# Patient Record
Sex: MALE | Race: WHITE | Employment: OTHER | URBAN - METROPOLITAN AREA
[De-identification: names, ages, dates, MRNs, and addresses within clinical notes are randomized per-mention and may not be internally consistent; named-entity substitution may affect disease eponyms.]

---

## 2017-11-16 ENCOUNTER — HOSPITAL ENCOUNTER (OUTPATIENT)
Dept: LAB | Age: 67
Discharge: HOME | End: 2017-11-16

## 2017-11-16 PROCEDURE — 88305 TISSUE EXAM BY PATHOLOGIST: CPT | Performed by: INTERNAL MEDICINE

## 2020-12-01 PROCEDURE — 88305 TISSUE EXAM BY PATHOLOGIST: CPT

## 2020-12-02 ENCOUNTER — HOSPITAL ENCOUNTER (OUTPATIENT)
Dept: LAB | Age: 70
Discharge: HOME | End: 2020-12-02

## 2022-07-10 ENCOUNTER — APPOINTMENT (OUTPATIENT)
Dept: CT IMAGING | Age: 72
End: 2022-07-10
Payer: MEDICARE

## 2022-07-10 ENCOUNTER — HOSPITAL ENCOUNTER (EMERGENCY)
Age: 72
Discharge: HOME | End: 2022-07-10
Attending: EMERGENCY MEDICINE
Payer: MEDICARE

## 2022-07-10 ENCOUNTER — APPOINTMENT (OUTPATIENT)
Dept: GENERAL RADIOLOGY | Age: 72
End: 2022-07-10
Payer: MEDICARE

## 2022-07-10 VITALS
RESPIRATION RATE: 12 BRPM | OXYGEN SATURATION: 94 % | WEIGHT: 250 LBS | HEART RATE: 57 BPM | SYSTOLIC BLOOD PRESSURE: 110 MMHG | BODY MASS INDEX: 46.01 KG/M2 | DIASTOLIC BLOOD PRESSURE: 55 MMHG | HEIGHT: 62 IN

## 2022-07-10 DIAGNOSIS — S42.291A OTHER CLOSED DISPLACED FRACTURE OF PROXIMAL END OF RIGHT HUMERUS, INITIAL ENCOUNTER: Primary | ICD-10-CM

## 2022-07-10 PROCEDURE — 72125 CT NECK SPINE W/O DYE: CPT

## 2022-07-10 PROCEDURE — 99284 EMERGENCY DEPT VISIT MOD MDM: CPT

## 2022-07-10 PROCEDURE — 71250 CT THORAX DX C-: CPT

## 2022-07-10 PROCEDURE — 96374 THER/PROPH/DIAG INJ IV PUSH: CPT

## 2022-07-10 PROCEDURE — 2700000000 HC OXYGEN THERAPY PER DAY

## 2022-07-10 PROCEDURE — 70450 CT HEAD/BRAIN W/O DYE: CPT

## 2022-07-10 PROCEDURE — 73030 X-RAY EXAM OF SHOULDER: CPT

## 2022-07-10 PROCEDURE — 6360000002 HC RX W HCPCS: Performed by: EMERGENCY MEDICINE

## 2022-07-10 RX ORDER — OXYCODONE HYDROCHLORIDE AND ACETAMINOPHEN 5; 325 MG/1; MG/1
1 TABLET ORAL EVERY 6 HOURS PRN
Qty: 25 TABLET | Refills: 0 | Status: ON HOLD | OUTPATIENT
Start: 2022-07-10 | End: 2022-07-18 | Stop reason: HOSPADM

## 2022-07-10 RX ORDER — HYDROMORPHONE HYDROCHLORIDE 1 MG/ML
1 INJECTION, SOLUTION INTRAMUSCULAR; INTRAVENOUS; SUBCUTANEOUS
Status: COMPLETED | OUTPATIENT
Start: 2022-07-10 | End: 2022-07-10

## 2022-07-10 RX ORDER — METHOCARBAMOL 750 MG/1
750 TABLET, FILM COATED ORAL 3 TIMES DAILY PRN
Qty: 20 TABLET | Refills: 0 | Status: SHIPPED | OUTPATIENT
Start: 2022-07-10 | End: 2022-07-20

## 2022-07-10 RX ADMIN — HYDROMORPHONE HYDROCHLORIDE 1 MG: 1 INJECTION, SOLUTION INTRAMUSCULAR; INTRAVENOUS; SUBCUTANEOUS at 05:29

## 2022-07-10 ASSESSMENT — ENCOUNTER SYMPTOMS
SHORTNESS OF BREATH: 0
CHEST TIGHTNESS: 0
COUGH: 0
WHEEZING: 0
APNEA: 0
STRIDOR: 0

## 2022-07-10 ASSESSMENT — PAIN - FUNCTIONAL ASSESSMENT: PAIN_FUNCTIONAL_ASSESSMENT: 0-10

## 2022-07-10 ASSESSMENT — PAIN SCALES - GENERAL
PAINLEVEL_OUTOF10: 8
PAINLEVEL_OUTOF10: 10

## 2022-07-10 ASSESSMENT — PAIN DESCRIPTION - ORIENTATION: ORIENTATION: RIGHT

## 2022-07-10 ASSESSMENT — PAIN DESCRIPTION - LOCATION
LOCATION: SHOULDER
LOCATION: SHOULDER

## 2022-07-10 NOTE — ED TRIAGE NOTES
Pt arrives via [facility] from home. C/O fall from standing. Skin tears noted on forearms. States he might have hit his head. 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 C/O right shoulder pain.

## 2022-07-10 NOTE — ED PROVIDER NOTES
[employer] Emergency Department Provider Note                   PCP:                Phoebe Hdz               Age: 80 y.o. Sex: male       ICD-10-CM    1. Other closed displaced fracture of proximal end of right humerus, initial encounter  S42.291A oxyCODONE-acetaminophen (PERCOCET) 5-325 MG per tablet       DISPOSITION Decision To Discharge 07/10/2022 07:22:20 AM       MDM  Number of Diagnoses or Management Options  Other closed displaced fracture of proximal end of right humerus, initial encounter  Diagnosis management comments: Patient is neurovascularly intact. Does have significantly damaged humerus with comminuted fracture with lots of displacement. I had orthopedics reviewed films. They recommend patient having outpatient plan and their office will call him tomorrow to get him in close follow-up recommend sling which we put him in. Also given pain control. Amount and/or Complexity of Data Reviewed  Tests in the radiology section of CPT®: ordered and reviewed         Orders Placed This Encounter   Procedures    CT HEAD WO CONTRAST    CT CERVICAL SPINE WO CONTRAST    XR SHOULDER RIGHT (MIN 2 VIEWS)    CT CHEST WO CONTRAST    Sling and Swath        Herlinda Perea is a 80 y.o. male who presents to the Emergency Department with chief complaint of    Chief Complaint   Patient presents with    Fall      Patient has a fall from standing. He tripped. He states he was getting up to get a midnight snack and did not pass out. He fell onto his right shoulder and has intense pain there. He had some mild pain to his head but the shoulder has been causing him severe discomfort. He has some swelling to the proximal humerus as well. EMS did give 2 doses of ketamine for comfort during transport. Review of Systems   Constitutional: Negative for activity change, appetite change, chills, fatigue and fever.    Respiratory: Negative for apnea, cough, chest tightness, shortness of breath, wheezing and stridor. Cardiovascular: Negative for chest pain and palpitations. Musculoskeletal: Positive for arthralgias and joint swelling. Neurological: Positive for headaches. Negative for seizures. All other systems reviewed and are negative. Past Medical History:   Diagnosis Date    Arthritis     hips, back  osteo    Cancer ([address]     skin  left arm removed    DISH (diffuse idiopathic skeletal hyperostosis)     DR LEE    Hypertension dx'd about 2008    controlled with meds    Thromboembolus ([address] 08, 12, AND 15+ yrs ago     ? left leg X 3 ON COUMADIN        Past Surgical History:   Procedure Laterality Date    LIPOMA RESECTION  as a teenager    left side    OTHER SURGICAL HISTORY      wisdom teeth removed    OTHER SURGICAL HISTORY      lipoma removed left hip    TOTAL HIP ARTHROPLASTY  4/2010    right    WISDOM TOOTH EXTRACTION  as a teenager        Family History   Problem Relation Age of Onset    Cancer Mother            Social Connections:     Frequency of Communication with Friends and Family: Not on file    Frequency of Social Gatherings with Friends and Family: Not on file    Attends [employer] Services: Not on file    Active Member of Clubs or Organizations: Not on file    Attends Club or Organization Meetings: Not on file    Marital Status: Not on file        Allergies   Allergen Reactions    Meperidine Nausea And Vomiting    Morphine Nausea And Vomiting        Vitals signs and nursing note reviewed. Physical Exam  Vitals and nursing note reviewed. Constitutional:       General: He is not in acute distress. Appearance: Normal appearance. He is not ill-appearing, toxic-appearing or diaphoretic. HENT:      Head: Normocephalic and atraumatic. Eyes:      General: No scleral icterus. Extraocular Movements: Extraocular movements intact. Conjunctiva/sclera: Conjunctivae normal.      Pupils: Pupils are equal, round, and reactive to light. Cardiovascular:      Comments: Intact distal pulses to the right arm. Pulmonary:      Effort: Pulmonary effort is normal. No respiratory distress. Breath sounds: No stridor. Musculoskeletal:      Cervical back: Normal range of motion and neck supple. Comments: Deformity to the right proximal upper arm. There is some ecchymosis present as well. Skin:     General: Skin is warm. Capillary Refill: Capillary refill takes less than 2 seconds. Coloration: Skin is not jaundiced or pale. Findings: No bruising, erythema or rash. Neurological:      General: No focal deficit present. Mental Status: He is alert and oriented to person, place, and time. Mental status is at baseline. Psychiatric:         Mood and Affect: Mood normal.         Behavior: Behavior normal.          Procedures        Labs Reviewed - No data to display     CT HEAD WO CONTRAST   Final Result   1. No CT evidence of acute intracranial process. EXAMINATION: CT HEAD WO CONTRAST, CT CERVICAL SPINE WO CONTRAST      HISTORY: Trauma. TECHNIQUE: Noncontrast CT of cervical spine was performed in the axial plane. Coronal and sagittal reformatted images were created and reviewed. Dose reduction technique used: Automated exposure control/Adjustment of the mA   and/or kV according to patient size/Use of iterative reconstruction technique. COMPARISON: None. FINDINGS:    No evidence of acute fracture or traumatic subluxation. There is normal cervical lordosis. Vertebral body heights and intervertebral disc spaces are maintained. The occipital condyles and visualized skull base is unremarkable. Multilevel degenerative changes are present throughout the cervical spine. No   high-grade spinal canal or neural foraminal stenosis is identified. There is no abnormal prevertebral soft tissue edema. No fluid collections are   identified within the paraspinal soft tissues. IMPRESSION:   No CT evidence of an acute fracture or traumatic subluxation. CT CERVICAL SPINE WO CONTRAST   Final Result   1. No CT evidence of acute intracranial process. EXAMINATION: CT HEAD WO CONTRAST, CT CERVICAL SPINE WO CONTRAST      HISTORY: Trauma. TECHNIQUE: Noncontrast CT of cervical spine was performed in the axial plane. Coronal and sagittal reformatted images were created and reviewed. Dose reduction technique used: Automated exposure control/Adjustment of the mA   and/or kV according to patient size/Use of iterative reconstruction technique. COMPARISON: None. FINDINGS:    No evidence of acute fracture or traumatic subluxation. There is normal cervical lordosis. Vertebral body heights and intervertebral disc spaces are maintained. The occipital condyles and visualized skull base is unremarkable. Multilevel degenerative changes are present throughout the cervical spine. No   high-grade spinal canal or neural foraminal stenosis is identified. There is no abnormal prevertebral soft tissue edema. No fluid collections are   identified within the paraspinal soft tissues. IMPRESSION:   No CT evidence of an acute fracture or traumatic subluxation. CT CHEST WO CONTRAST   Final Result      There is a somewhat focal, 1 cm opacity projecting in the right upper lobe. There is associated opacity extending to the hilum. This could represent a focal   contusion. A small nodule is not excluded. Follow-up imaging is recommended. There is no pneumothorax. The known right humeral fracture and the glenohumeral joint are not fully   visualized. There is significant intramuscular contusion involving the right pectoralis   major muscle. Aneurysmal dilatation of the ascending aorta as described above. XR SHOULDER RIGHT (MIN 2 VIEWS)   Final Result   Fracture of the proximal right humerus as described above. The appearance of the glenohumeral joint suggests dislocation or widening,   perhaps secondary to fluid in the joint. Voice dictation software was used during the making of this note. This software is not perfect and grammatical and other typographical errors may be present. This note has not been completely proofread for errors.       Beau Bennett MD  07/10/22 [id number]

## 2022-07-10 NOTE — ED NOTES
I have reviewed discharge instructions with the patient. The patient verbalized understanding. Patient left ED via Discharge Method: wheelchair to Home with son  Opportunity for questions and clarification provided. Patient given 2 scripts. To continue your aftercare when you leave the hospital, you may receive an automated call from our care team to check in on how you are doing. This is a free service and part of our promise to provide the best care and service to meet your aftercare needs.  If you have questions, or wish to unsubscribe from this service please call [phone]. Thank you for Choosing our [facility] Emergency Department.       Candice Ye RN  07/10/22 [id number]

## 2022-07-12 ENCOUNTER — OFFICE VISIT (OUTPATIENT)
Dept: ORTHOPEDIC SURGERY | Age: 72
End: 2022-07-12
Payer: MEDICARE

## 2022-07-12 DIAGNOSIS — S42.201A CLOSED FRACTURE OF PROXIMAL END OF RIGHT HUMERUS, UNSPECIFIED FRACTURE MORPHOLOGY, INITIAL ENCOUNTER: ICD-10-CM

## 2022-07-12 DIAGNOSIS — S42.201A CLOSED FRACTURE OF PROXIMAL END OF RIGHT HUMERUS, UNSPECIFIED FRACTURE MORPHOLOGY, INITIAL ENCOUNTER: Primary | ICD-10-CM

## 2022-07-12 PROCEDURE — 99024 POSTOP FOLLOW-UP VISIT: CPT | Performed by: ORTHOPAEDIC SURGERY

## 2022-07-12 PROCEDURE — L3650 SO 8 ABD RESTRAINT PRE OTS: HCPCS | Performed by: ORTHOPAEDIC SURGERY

## 2022-07-12 RX ORDER — OXYCODONE AND ACETAMINOPHEN 10; 325 MG/1; MG/1
1 TABLET ORAL EVERY 4 HOURS PRN
Qty: 6 TABLET | Refills: 0 | Status: ON HOLD | OUTPATIENT
Start: 2022-07-12 | End: 2022-07-18 | Stop reason: HOSPADM

## 2022-07-12 NOTE — PROGRESS NOTES
Progress Note    Patient: Camila Weathers MRN: [id number]  SSN: [id number]    YOB: 1950  Age: 80 y.o. Sex: male        7/12/2022      Subjective:     Patient is now about 4 days out from an injury which he fell and injured his right shoulder. He is pretty miserable today. He is having a lot of pain in his right shoulder. The pain medication only helps a little bit. Cannot really get comfortable. He is also complaining of some pain in his flank on the right side as well. He had a chest CT which just revealed an old healed rib fracture but this is where the area of his ribs where he is hurting as well. Does take Xarelto for what sounds like a DVT PE several years ago he been on Xarelto ever since. Objective: There were no vitals filed for this visit. Physical Exam:     Skin - no open wounds or pressure sores, he does have a significant mount of ecchymosis and bruising over his superior aspect of his right shoulder down around his right shoulder area  Motor and sensory function intact in RIGHT UPPER extremity  Pulses palpable in RIGHT UPPER extremity     XRAY FINDINGS:  [id number] right proximal humerus fracture, findings- true AP and scapular Y views the right shoulder shows that he has what appears to be a pretty comminuted proximal humerus fracture. This appears to be a true four-part and he also has this large fragment off of the diaphysis of his humerus    Assessment:     Right four-part proximal humerus fracture    Plan:     I have spoken with the patient regarding different treatment options. I do think that he really has 2 options I think 1 would be just to treat this nonoperatively I think the other one be a prosthetic replacement. I talked him a little bit about this.   And I explained that Dr. Sarwat Juarez is someone in my practice that has quite a bit of experience with prosthetic replacements both for fractures as well as for other problems and I think it would be reasonable for him to take a look at him. He has actually reviewed the x-rays with me today. The plan will be to see if he can be seen by Dr. Stacie Whatley and hopefully Dr. Stacie Whatley can talk to him about what he thinks is our the pluses and minuses of proceeding with operative intervention with likely proceeding with prosthetic replacement.     Signed By: Dolores Lester MD     July 12, 2022

## 2022-07-13 ENCOUNTER — HOSPITAL ENCOUNTER (OUTPATIENT)
Dept: SURGERY | Age: 72
Discharge: HOME | DRG: 483 | End: 2022-07-16
Payer: MEDICARE

## 2022-07-13 ENCOUNTER — ANESTHESIA EVENT (OUTPATIENT)
Dept: SURGERY | Age: 72
DRG: 483 | End: 2022-07-13
Payer: MEDICARE

## 2022-07-13 ENCOUNTER — OFFICE VISIT (OUTPATIENT)
Dept: ORTHOPEDIC SURGERY | Age: 72
End: 2022-07-13
Payer: MEDICARE

## 2022-07-13 ENCOUNTER — TELEPHONE (OUTPATIENT)
Dept: ORTHOPEDIC SURGERY | Age: 72
End: 2022-07-13

## 2022-07-13 ENCOUNTER — PREP FOR PROCEDURE (OUTPATIENT)
Dept: ORTHOPEDIC SURGERY | Age: 72
End: 2022-07-13

## 2022-07-13 VITALS
DIASTOLIC BLOOD PRESSURE: 72 MMHG | TEMPERATURE: 97.5 F | RESPIRATION RATE: 16 BRPM | OXYGEN SATURATION: 98 % | HEART RATE: 84 BPM | BODY MASS INDEX: 34.37 KG/M2 | HEIGHT: 70 IN | SYSTOLIC BLOOD PRESSURE: 124 MMHG | WEIGHT: 240.1 LBS

## 2022-07-13 DIAGNOSIS — S42.241A CLOSED 4-PART FRACTURE OF SURGICAL NECK OF RIGHT HUMERUS, INITIAL ENCOUNTER: Primary | ICD-10-CM

## 2022-07-13 DIAGNOSIS — M19.011 DEGENERATIVE JOINT DISEASE OF RIGHT ACROMIOCLAVICULAR JOINT: ICD-10-CM

## 2022-07-13 DIAGNOSIS — S42.391A OTHER CLOSED FRACTURE OF SHAFT OF RIGHT HUMERUS, INITIAL ENCOUNTER: ICD-10-CM

## 2022-07-13 DIAGNOSIS — S42.241A CLOSED 4-PART FRACTURE OF SURGICAL NECK OF RIGHT HUMERUS, INITIAL ENCOUNTER: ICD-10-CM

## 2022-07-13 PROBLEM — S42.301A CLOSED FRACTURE OF SHAFT OF RIGHT HUMERUS: Status: ACTIVE | Noted: 2022-07-13

## 2022-07-13 LAB
ABO + RH BLD: NORMAL
ALBUMIN SERPL-MCNC: 3.4 G/DL (ref 3.2–4.6)
ALBUMIN/GLOB SERPL: 0.9 {RATIO} (ref 1.2–3.5)
ALP SERPL-CCNC: 46 U/L (ref 50–136)
ALT SERPL-CCNC: 21 U/L (ref 12–65)
ANION GAP SERPL CALC-SCNC: 7 MMOL/L (ref 7–16)
APPEARANCE UR: ABNORMAL
APTT PPP: 30.5 SEC (ref 24.1–35.1)
AST SERPL-CCNC: 29 U/L (ref 15–37)
BACTERIA SPEC CULT: NORMAL
BACTERIA URNS QL MICRO: ABNORMAL /HPF
BASOPHILS # BLD: 0.1 K/UL (ref 0–0.2)
BASOPHILS NFR BLD: 1 % (ref 0–2)
BILIRUB SERPL-MCNC: 0.9 MG/DL (ref 0.2–1.1)
BILIRUB UR QL: NEGATIVE
BLOOD GROUP ANTIBODIES SERPL: NORMAL
BUN SERPL-MCNC: 16 MG/DL (ref 8–23)
CALCIUM SERPL-MCNC: 9.5 MG/DL (ref 8.3–10.4)
CHLORIDE SERPL-SCNC: 104 MMOL/L (ref 98–107)
CO2 SERPL-SCNC: 27 MMOL/L (ref 21–32)
COLOR UR: ABNORMAL
CREAT SERPL-MCNC: 0.96 MG/DL (ref 0.8–1.5)
DIFFERENTIAL METHOD BLD: ABNORMAL
EOSINOPHIL # BLD: 0.1 K/UL (ref 0–0.8)
EOSINOPHIL NFR BLD: 2 % (ref 0.5–7.8)
EPI CELLS #/AREA URNS HPF: ABNORMAL /HPF
ERYTHROCYTE [DISTWIDTH] IN BLOOD BY AUTOMATED COUNT: 13.1 % (ref 11.9–14.6)
GLOBULIN SER CALC-MCNC: 3.6 G/DL (ref 2.3–3.5)
GLUCOSE SERPL-MCNC: 125 MG/DL (ref 65–100)
GLUCOSE UR STRIP.AUTO-MCNC: NEGATIVE MG/DL
HCT VFR BLD AUTO: 31 % (ref 41.1–50.3)
HGB BLD-MCNC: 10.6 G/DL (ref 13.6–17.2)
HGB UR QL STRIP: NEGATIVE
IMM GRANULOCYTES # BLD AUTO: 0 K/UL (ref 0–0.5)
IMM GRANULOCYTES NFR BLD AUTO: 1 % (ref 0–5)
INR PPP: 1.3
KETONES UR QL STRIP.AUTO: ABNORMAL MG/DL
LEUKOCYTE ESTERASE UR QL STRIP.AUTO: ABNORMAL
LYMPHOCYTES # BLD: 1.9 K/UL (ref 0.5–4.6)
LYMPHOCYTES NFR BLD: 23 % (ref 13–44)
MAGNESIUM SERPL-MCNC: 2.2 MG/DL (ref 1.8–2.4)
MCH RBC QN AUTO: 31.2 PG (ref 26.1–32.9)
MCHC RBC AUTO-ENTMCNC: 34.2 G/DL (ref 31.4–35)
MCV RBC AUTO: 91.2 FL (ref 79.6–97.8)
MONOCYTES # BLD: 0.9 K/UL (ref 0.1–1.3)
MONOCYTES NFR BLD: 10 % (ref 4–12)
MUCOUS THREADS URNS QL MICRO: ABNORMAL /LPF
NEUTS SEG # BLD: 5.3 K/UL (ref 1.7–8.2)
NEUTS SEG NFR BLD: 64 % (ref 43–78)
NITRITE UR QL STRIP.AUTO: NEGATIVE
NRBC # BLD: 0 K/UL (ref 0–0.2)
PH UR STRIP: 7 [PH] (ref 5–9)
PLATELET # BLD AUTO: 264 K/UL (ref 150–450)
PMV BLD AUTO: 10.1 FL (ref 9.4–12.3)
POTASSIUM SERPL-SCNC: 3.5 MMOL/L (ref 3.5–5.1)
PROT SERPL-MCNC: 7 G/DL (ref 6.3–8.2)
PROT UR STRIP-MCNC: ABNORMAL MG/DL
PROTHROMBIN TIME: 16.2 SEC (ref 12.6–14.5)
RBC # BLD AUTO: 3.4 M/UL (ref 4.23–5.6)
RBC #/AREA URNS HPF: ABNORMAL /HPF
SERVICE CMNT-IMP: NORMAL
SODIUM SERPL-SCNC: 138 MMOL/L (ref 136–145)
SP GR UR REFRACTOMETRY: 1.03 (ref 1–1.02)
SPECIMEN EXP DATE BLD: NORMAL
UROBILINOGEN UR QL STRIP.AUTO: 2 EU/DL (ref 0.2–1)
WBC # BLD AUTO: 8.3 K/UL (ref 4.3–11.1)
WBC URNS QL MICRO: ABNORMAL /HPF

## 2022-07-13 PROCEDURE — 80053 COMPREHEN METABOLIC PANEL: CPT

## 2022-07-13 PROCEDURE — 86901 BLOOD TYPING SEROLOGIC RH(D): CPT

## 2022-07-13 PROCEDURE — 85610 PROTHROMBIN TIME: CPT

## 2022-07-13 PROCEDURE — 85730 THROMBOPLASTIN TIME PARTIAL: CPT

## 2022-07-13 PROCEDURE — 99215 OFFICE O/P EST HI 40 MIN: CPT | Performed by: ORTHOPAEDIC SURGERY

## 2022-07-13 PROCEDURE — 83735 ASSAY OF MAGNESIUM: CPT

## 2022-07-13 PROCEDURE — 81001 URINALYSIS AUTO W/SCOPE: CPT

## 2022-07-13 PROCEDURE — 85025 COMPLETE CBC W/AUTO DIFF WBC: CPT

## 2022-07-13 PROCEDURE — 87641 MR-STAPH DNA AMP PROBE: CPT

## 2022-07-13 PROCEDURE — 1123F ACP DISCUSS/DSCN MKR DOCD: CPT | Performed by: ORTHOPAEDIC SURGERY

## 2022-07-13 RX ORDER — CHLORAL HYDRATE 500 MG
3000 CAPSULE ORAL 3 TIMES DAILY
COMMUNITY

## 2022-07-13 RX ORDER — TAMSULOSIN HYDROCHLORIDE 0.4 MG/1
0.4 CAPSULE ORAL DAILY
COMMUNITY

## 2022-07-13 RX ORDER — SODIUM CHLORIDE 0.9 % (FLUSH) 0.9 %
5-40 SYRINGE (ML) INJECTION EVERY 12 HOURS SCHEDULED
Status: CANCELLED | OUTPATIENT
Start: 2022-07-13

## 2022-07-13 RX ORDER — SODIUM CHLORIDE 0.9 % (FLUSH) 0.9 %
5-40 SYRINGE (ML) INJECTION PRN
Status: CANCELLED | OUTPATIENT
Start: 2022-07-13

## 2022-07-13 RX ORDER — METOPROLOL SUCCINATE 50 MG/1
50 TABLET, EXTENDED RELEASE ORAL DAILY
COMMUNITY

## 2022-07-13 RX ORDER — SODIUM CHLORIDE 9 MG/ML
INJECTION, SOLUTION INTRAVENOUS PRN
Status: CANCELLED | OUTPATIENT
Start: 2022-07-13

## 2022-07-13 RX ORDER — FENOFIBRATE 145 MG/1
145 TABLET, COATED ORAL DAILY
COMMUNITY

## 2022-07-13 RX ORDER — MAGNESIUM OXIDE 400 MG/1
400 TABLET ORAL DAILY
COMMUNITY

## 2022-07-13 ASSESSMENT — ENCOUNTER SYMPTOMS
DYSPNEA ACTIVITY LEVEL: AFTER AMBULATING 1 FLIGHT OF STAIRS
SHORTNESS OF BREATH: 1

## 2022-07-13 ASSESSMENT — PAIN DESCRIPTION - ORIENTATION: ORIENTATION: RIGHT

## 2022-07-13 ASSESSMENT — LIFESTYLE VARIABLES: SMOKING_STATUS: 0

## 2022-07-13 ASSESSMENT — PAIN SCALES - GENERAL: PAINLEVEL_OUTOF10: 6

## 2022-07-13 ASSESSMENT — PAIN - FUNCTIONAL ASSESSMENT: PAIN_FUNCTIONAL_ASSESSMENT: INTOLERABLE, UNABLE TO DO ANY ACTIVE OR PASSIVE ACTIVITIES

## 2022-07-13 ASSESSMENT — PAIN DESCRIPTION - DESCRIPTORS: DESCRIPTORS: ACHING

## 2022-07-13 ASSESSMENT — PAIN DESCRIPTION - LOCATION: LOCATION: SHOULDER

## 2022-07-13 NOTE — H&P
Subjective:     Patient is a 80 y.o. RHD MALE WITH RIGHT SHOULDER PAIN. SEE OFFICE NOTE. Patient Active Problem List    Diagnosis Date Noted    Closed 4-part fracture of surgical neck of right humerus 07/13/2022    Closed fracture of shaft of right humerus 07/13/2022    Degenerative joint disease of right acromioclavicular joint 07/13/2022    S/P prosthetic total arthroplasty of the hip 04/19/2013    Osteoarthritis of left hip 04/18/2013    HTN (hypertension), benign 20/85/3812    Diastolic dysfunction 49/01/1363    Tobacco abuse 03/24/2010     Past Medical History:   Diagnosis Date    Arthritis     hips, back  osteo    Atrial fibrillation (HCC)     x 1 episode- takes metoprolol    Cancer ([address]     skin  left arm removed    DISH (diffuse idiopathic skeletal hyperostosis)     Followed by Rheum    Hypertension dx'd about 2008    controlled with meds    Osteoporosis     PE (pulmonary thromboembolism) ([address]     Sleep apnea     resolved after weight loss     Thromboembolus ([address] 08, 12, AND 15+ yrs ago     ? left leg X 3- xarelto      Past Surgical History:   Procedure Laterality Date    JOINT REPLACEMENT Left     Hip    LIPOMA RESECTION  as a teenager    left side    MULTIPLE TOOTH EXTRACTIONS      Al teeth removed    OTHER SURGICAL HISTORY      wisdom teeth removed    OTHER SURGICAL HISTORY      lipoma removed left side    TOTAL HIP ARTHROPLASTY  4/2010    right    WISDOM TOOTH EXTRACTION  as a teenager      Prior to Admission medications    Medication Sig Start Date End Date Taking?  Authorizing Provider   rivaroxaban (XARELTO) 20 MG TABS tablet Take 20 mg by mouth Daily with supper    Historical Provider, MD   metoprolol succinate (TOPROL XL) 50 MG extended release tablet Take 50 mg by mouth daily    Historical Provider, MD   tamsulosin (FLOMAX) 0.4 MG capsule Take 0.4 mg by mouth daily    Historical Provider, MD   fenofibrate (TRICOR) 145 MG tablet Take 145 mg by mouth daily    Historical Provider, MD Omega-3 Fatty Acids (FISH OIL) 1000 MG CAPS Take 3,000 mg by mouth 3 times daily    Historical Provider, MD   magnesium oxide (MAG-OX) 400 MG tablet Take 400 mg by mouth daily    Historical Provider, MD   oxyCODONE-acetaminophen (PERCOCET)  MG per tablet Take 1 tablet by mouth every 4 hours as needed for Pain for up to 30 days. Intended supply: 30 days 22  Ayo Juarez MD   oxyCODONE-acetaminophen (PERCOCET) 5-325 MG per tablet Take 1 tablet by mouth every 6 hours as needed for Pain for up to 6 days. Intended supply: 3 days. Take lowest dose possible to manage pain 7/10/22 7/16/22  Figueroa Maxwell MD   methocarbamol (ROBAXIN-750) 750 MG tablet Take 1 tablet by mouth 3 times daily as needed (spasm) 7/10/22 7/20/22  Figueroa Maxwell MD   POTASSIUM PO Take 1 tablet by mouth daily  3/2/10   Ar Automatic Reconciliation   ascorbic acid (VITAMIN C) 500 MG tablet Take 500 mg by mouth 3/2/10   Ar Automatic Reconciliation   celecoxib (CELEBREX) 200 MG capsule Take 200 mg by mouth 2 times daily    Ar Automatic Reconciliation   lisinopril-hydroCHLOROthiazide (PRINZIDE;ZESTORETIC) 20-25 MG per tablet Take 1 tablet by mouth daily  3/2/10   Ar Automatic Reconciliation   oxyCODONE (OXYCONTIN) 10 MG extended release tablet Take 10 mg by mouth every 12 hours. 13   Ar Automatic Reconciliation   oxyCODONE (ROXICODONE) 5 MG immediate release tablet Take 5 mg by mouth every 4 hours as needed.  13   Ar Automatic Reconciliation   warfarin (COUMADIN) 6 MG tablet Take 12 mg by mouth daily    Ar Automatic Reconciliation     Allergies   Allergen Reactions    Meperidine Nausea And Vomiting    Morphine Nausea And Vomiting      Social History     Tobacco Use    Smoking status: Former Smoker     Packs/day: 0.50     Years: 20.00     Pack years: 10.00     Quit date:      Years since quittin.5    Smokeless tobacco: Never Used    Tobacco comment: Quit smoking: quit about 2009   Substance Use Topics    Alcohol use: No      Family History   Problem Relation Age of Onset    Cancer Mother       Review of Systems  Pertinent items are noted in HPI. Objective:     No data found. There were no vitals taken for this visit. General Appearance:    Alert, cooperative, no distress, appears stated age   Head:    Normocephalic, without obvious abnormality, atraumatic                       Back:     Symmetric, no curvature, ROM normal, no CVA tenderness   Lungs:     Clear to auscultation bilaterally, respirations unlabored   Chest wall:    No tenderness or deformity   Heart:    Regular rate and rhythm, S1 and S2 normal, no murmur, rub   or gallop               Extremities:   Extremities normal, atraumatic, no cyanosis or edema   Pulses:   2+ and symmetric all extremities   Skin:   Skin color, texture, turgor normal, no rashes or lesions   Lymph nodes:   Cervical, supraclavicular, and axillary nodes normal   Neurologic:   CNII-XII intact. Normal strength, sensation and reflexes       throughout           Assessment:     Active Problems:    Closed 4-part fracture of surgical neck of right humerus    Closed fracture of shaft of right humerus    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 *      Plan:     The various methods of treatment have been discussed with the patient and family. PATIENT HAS EXHAUSTED NON-OPERATIVE MODALITIES     After consideration of risks, benefits and other options for treatment, the patient has consented to surgical intervention.     SEE OFFICE NOTE    Yancy Bobo MD

## 2022-07-13 NOTE — TELEPHONE ENCOUNTER
Pt  Saw  Posta  Today  And he  Told  Them  To  Do  Something and they are at home  Having  Trouble doing what  He  Saif.   Someone  Please  Call  Them

## 2022-07-13 NOTE — PERIOP NOTE
Patient verified name and . Order for consent found in EHR and matches case posting; patient verified. Type 3 surgery, PAT walk in assessment complete. Labs per surgeon: CBC,CMP, PT, Mag, UA, Type and Cross x 2; results pending. Blood bank bracelet placed on left arm and pt instructed not to remove. Labs per anesthesia protocol: PTT; results pending. EKG: Found in EHR- done 22; abnormal- will have anesthesia review. Dr. Rishabh Vásquez did an in person visit with pt and reviewed chart. Dr. Rishabh Vásquez ok'd for surgery. MRSA/MSSA swab collected; pharmacy to review and dose antibiotic as appropriate. Hospital approved surgical skin cleanser and instructions to return bottle on DOS given per hospital policy. Patient provided with handouts including Guide to Surgery, Pain Management, Hand Hygiene, Blood Transfusion Education, and [facility] Brochure. Patient answered medical/surgical history questions at their best of ability. All prior to admission medications documented in [facility]. Original medication prescription bottle not visualized during patient appointment. Patient instructed to hold all vitamins 3 weeks prior to surgery and NSAIDS 5 days prior to surgery. Patient teach back successful and patient demonstrates knowledge of instruction.

## 2022-07-13 NOTE — PROGRESS NOTES
Total Joint Surgery Preoperative Chart Review      Patient ID:  Petar Robison  [id number]  5 y.o.  1950  Surgeon: Dr. Pepe Muhammad  Date of Surgery: [unfilled]  Procedure: Total Right Shoulder Arthroplasty  Primary Care Physician: Rafael Arreguin [phone]  Specialty Physician(s):      Subjective:   Petar Robison is a 80 y.o. White (non-) male who presents for preoperative evaluation for Total Right Shoulder arthroplasty. This is a preoperative chart review note based on data collected by the nurse at the surgical Pre-Assessment visit. Past Medical History:   Diagnosis Date    Arthritis     hips, back  osteo    Atrial fibrillation (HCC)     x 1 episode- takes metoprolol    Cancer ([address]     skin  left arm removed    DISH (diffuse idiopathic skeletal hyperostosis)     Followed by Rheum    Hypertension dx'd about     controlled with meds    Osteoporosis     PE (pulmonary thromboembolism) ([address]     Sleep apnea     resolved after weight loss     SOB (shortness of breath) on exertion     Some SOB with just sitting    Thromboembolus ([address] 08, 12, AND 15+ yrs ago     ?  left leg X 3- xarelto      Past Surgical History:   Procedure Laterality Date    JOINT REPLACEMENT Left     Hip    LIPOMA RESECTION  as a teenager    left side    MULTIPLE TOOTH EXTRACTIONS      Al teeth removed    OTHER SURGICAL HISTORY      wisdom teeth removed    OTHER SURGICAL HISTORY      lipoma removed left side    TOTAL HIP ARTHROPLASTY  2010    right    WISDOM TOOTH EXTRACTION  as a teenager     Family History   Problem Relation Age of Onset    Cancer Mother       Social History     Tobacco Use    Smoking status: Former Smoker     Packs/day: 0.50     Years: 20.00     Pack years: 10.00     Quit date:      Years since quittin.5    Smokeless tobacco: Never Used    Tobacco comment: Quit smoking: quit about 2009   Substance Use Topics    Alcohol use: No       Prior to Admission medications Medication Sig Start Date End Date Taking? Authorizing Provider   rivaroxaban (XARELTO) 20 MG TABS tablet Take 20 mg by mouth Daily with supper   Yes Historical Provider, MD   metoprolol succinate (TOPROL XL) 50 MG extended release tablet Take 50 mg by mouth daily   Yes Historical Provider, MD   tamsulosin (FLOMAX) 0.4 MG capsule Take 0.4 mg by mouth daily   Yes Historical Provider, MD   fenofibrate (TRICOR) 145 MG tablet Take 145 mg by mouth daily   Yes Historical Provider, MD   Omega-3 Fatty Acids (FISH OIL) 1000 MG CAPS Take 3,000 mg by mouth 3 times daily   Yes Historical Provider, MD   magnesium oxide (MAG-OX) 400 MG tablet Take 400 mg by mouth daily   Yes Historical Provider, MD   oxyCODONE-acetaminophen (PERCOCET)  MG per tablet Take 1 tablet by mouth every 4 hours as needed for Pain for up to 30 days. Intended supply: 30 days 7/12/22 8/11/22  Tiffanie Forbes MD   oxyCODONE-acetaminophen (PERCOCET) 5-325 MG per tablet Take 1 tablet by mouth every 6 hours as needed for Pain for up to 6 days. Intended supply: 3 days. Take lowest dose possible to manage pain 7/10/22 7/16/22  Li Gould MD   methocarbamol (ROBAXIN-750) 750 MG tablet Take 1 tablet by mouth 3 times daily as needed (spasm) 7/10/22 7/20/22  Li Gould MD   POTASSIUM PO Take 1 tablet by mouth daily  3/2/10   Ar Automatic Reconciliation   ascorbic acid (VITAMIN C) 500 MG tablet Take 500 mg by mouth 3/2/10   Ar Automatic Reconciliation   celecoxib (CELEBREX) 200 MG capsule Take 200 mg by mouth 2 times daily    Ar Automatic Reconciliation   lisinopril-hydroCHLOROthiazide (PRINZIDE;ZESTORETIC) 20-25 MG per tablet Take 1 tablet by mouth daily  3/2/10   Ar Automatic Reconciliation   oxyCODONE (OXYCONTIN) 10 MG extended release tablet Take 10 mg by mouth every 12 hours. 4/22/13   Ar Automatic Reconciliation   oxyCODONE (ROXICODONE) 5 MG immediate release tablet Take 5 mg by mouth every 4 hours as needed.  4/22/13   Ar Automatic Reconciliation warfarin (COUMADIN) 6 MG tablet Take 12 mg by mouth daily    Ar Automatic Reconciliation     Allergies   Allergen Reactions    Meperidine Nausea And Vomiting    Morphine Nausea And Vomiting          Objective:     Physical Exam:   Patient Vitals for the past 24 hrs:   Temp Pulse Resp BP SpO2   07/13/22 1048 97.5 °F (36.4 °C) 84 16 124/72 98 %       ECG:    No results found for this or any previous visit (from the past 4464 hour(s)).     Data Review:   Labs:   Recent Results (from the past 24 hour(s))   Urinalysis    Collection Time: 07/13/22 10:57 AM   Result Value Ref Range    Color, UA DARK YELLOW      Appearance CLOUDY      Specific Gravity, UA 1.033 (H) 1.001 - 1.023      pH, Urine 7.0 5.0 - 9.0      Protein, UA TRACE (A) NEG mg/dL    Glucose, UA Negative mg/dL    Ketones, Urine TRACE (A) NEG mg/dL    Bilirubin Urine Negative NEG      Blood, Urine Negative NEG      Urobilinogen, Urine 2.0 (H) 0.2 - 1.0 EU/dL    Nitrite, Urine Negative NEG      Leukocyte Esterase, Urine SMALL (A) NEG      WBC, UA 3-5 0 /hpf    RBC, UA 5-10 0 /hpf    Epithelial Cells UA 0-3 0 /hpf    BACTERIA, URINE 1+ (H) 0 /hpf    Mucus, UA 1+ (H) 0 /lpf   TYPE AND SCREEN    Collection Time: 07/13/22 10:57 AM   Result Value Ref Range    Crossmatch expiration date 07/16/2022,2359     ABO/Rh A POSITIVE     Antibody Screen NEG    Protime-INR    Collection Time: 07/13/22 10:57 AM   Result Value Ref Range    Protime 16.2 (H) 12.6 - 14.5 sec    INR 1.3     Magnesium    Collection Time: 07/13/22 10:57 AM   Result Value Ref Range    Magnesium 2.2 1.8 - 2.4 mg/dL   Comprehensive Metabolic Panel    Collection Time: 07/13/22 10:57 AM   Result Value Ref Range    Sodium 138 136 - 145 mmol/L    Potassium 3.5 3.5 - 5.1 mmol/L    Chloride 104 98 - 107 mmol/L    CO2 27 21 - 32 mmol/L    Anion Gap 7 7 - 16 mmol/L    Glucose 125 (H) 65 - 100 mg/dL    BUN 16 8 - 23 MG/DL    CREATININE 0.96 0.8 - 1.5 MG/DL    GFR African American >60 >60 ml/min/1.73m2    GFR Non-African American >60 >60 ml/min/1.73m2    Calcium 9.5 8.3 - 10.4 MG/DL    Total Bilirubin 0.9 0.2 - 1.1 MG/DL    ALT 21 12 - 65 U/L    AST 29 15 - 37 U/L    Alk Phosphatase 46 (L) 50 - 136 U/L    Total Protein 7.0 6.3 - 8.2 g/dL    Albumin 3.4 3.2 - 4.6 g/dL    Globulin 3.6 (H) 2.3 - 3.5 g/dL    Albumin/Globulin Ratio 0.9 (L) 1.2 - 3.5     CBC with Auto Differential    Collection Time: 07/13/22 10:57 AM   Result Value Ref Range    WBC 8.3 4.3 - 11.1 K/uL    RBC 3.40 (L) 4.23 - 5.6 M/uL    Hemoglobin 10.6 (L) 13.6 - 17.2 g/dL    Hematocrit 31.0 (L) 41.1 - 50.3 %    MCV 91.2 79.6 - 97.8 FL    MCH 31.2 26.1 - 32.9 PG    MCHC 34.2 31.4 - 35.0 g/dL    RDW 13.1 11.9 - 14.6 %    Platelets 549 002 - 247 K/uL    MPV 10.1 9.4 - 12.3 FL    nRBC 0.00 0.0 - 0.2 K/uL    Differential Type AUTOMATED      Seg Neutrophils 64 43 - 78 %    Lymphocytes 23 13 - 44 %    Monocytes 10 4.0 - 12.0 %    Eosinophils % 2 0.5 - 7.8 %    Basophils 1 0.0 - 2.0 %    Immature Granulocytes 1 0.0 - 5.0 %    Segs Absolute 5.3 1.7 - 8.2 K/UL    Absolute Lymph # 1.9 0.5 - 4.6 K/UL    Absolute Mono # 0.9 0.1 - 1.3 K/UL    Absolute Eos # 0.1 0.0 - 0.8 K/UL    Basophils Absolute 0.1 0.0 - 0.2 K/UL    Absolute Immature Granulocyte 0.0 0.0 - 0.5 K/UL   APTT    Collection Time: 07/13/22 10:57 AM   Result Value Ref Range    PTT 30.5 24.1 - 35.1 SEC         Problem List:  )  Patient Active Problem List   Diagnosis    HTN (hypertension), benign    S/P prosthetic total arthroplasty of the hip    Diastolic dysfunction    Osteoarthritis of left hip    Tobacco abuse    Closed 4-part fracture of surgical neck of right humerus    Closed fracture of shaft of right humerus    Degenerative joint disease of right acromioclavicular joint       Total Joint Surgery Pre-Assessment Recommendations:           Continuous saturation monitoring UPON ARRIVAL TO FLOOR. Heated high flow lung expansion x 24 hours and prn.   IP RT consult for respiratory evaluation every shift        Signed By: PROMISE Wise - CNP-C    July 13, 2022

## 2022-07-13 NOTE — ANESTHESIA PRE PROCEDURE
Department of Anesthesiology  Preprocedure Note       Name:  Anton Kennedy   Age:  80 y.o.  :  1950                                          MRN:  [id number]         Date:  2022      Surgeon: Lord Sy):  Yulissa Arroyo MD    Procedure: Procedure(s):  right reverse total shoulder arthroplasty with a delta xtend prosthesis and biceps tenodesis in combination with a latissimus rené and teres major tendon transfer, general interscalene block, 23hr observation    Medications prior to admission:   Prior to Admission medications    Medication Sig Start Date End Date Taking? Authorizing Provider   rivaroxaban (XARELTO) 20 MG TABS tablet Take 20 mg by mouth Daily with supper    Historical Provider, MD   metoprolol succinate (TOPROL XL) 50 MG extended release tablet Take 50 mg by mouth daily    Historical Provider, MD   tamsulosin (FLOMAX) 0.4 MG capsule Take 0.4 mg by mouth daily    Historical Provider, MD   fenofibrate (TRICOR) 145 MG tablet Take 145 mg by mouth daily    Historical Provider, MD   Omega-3 Fatty Acids (FISH OIL) 1000 MG CAPS Take 3,000 mg by mouth 3 times daily    Historical Provider, MD   magnesium oxide (MAG-OX) 400 MG tablet Take 400 mg by mouth daily    Historical Provider, MD   oxyCODONE-acetaminophen (PERCOCET)  MG per tablet Take 1 tablet by mouth every 4 hours as needed for Pain for up to 30 days. Intended supply: 30 days 22  Leonor Dockery MD   oxyCODONE-acetaminophen (PERCOCET) 5-325 MG per tablet Take 1 tablet by mouth every 6 hours as needed for Pain for up to 6 days. Intended supply: 3 days.  Take lowest dose possible to manage pain 7/10/22 7/16/22  Shilpa Tillman MD   methocarbamol (ROBAXIN-750) 750 MG tablet Take 1 tablet by mouth 3 times daily as needed (spasm) 7/10/22 7/20/22  Shilpa Tillman MD   POTASSIUM PO Take 1 tablet by mouth daily  3/2/10   Ar Automatic Reconciliation   ascorbic acid (VITAMIN C) 500 MG tablet Take 500 mg by mouth 3/2/10   Ar Automatic Reconciliation   celecoxib (CELEBREX) 200 MG capsule Take 200 mg by mouth 2 times daily    Ar Automatic Reconciliation   lisinopril-hydroCHLOROthiazide (PRINZIDE;ZESTORETIC) 20-25 MG per tablet Take 1 tablet by mouth daily  3/2/10   Ar Automatic Reconciliation   oxyCODONE (OXYCONTIN) 10 MG extended release tablet Take 10 mg by mouth every 12 hours. 4/22/13   Ar Automatic Reconciliation   oxyCODONE (ROXICODONE) 5 MG immediate release tablet Take 5 mg by mouth every 4 hours as needed. 4/22/13   Ar Automatic Reconciliation   warfarin (COUMADIN) 6 MG tablet Take 12 mg by mouth daily    Ar Automatic Reconciliation       Current medications:    No current facility-administered medications for this encounter. Current Outpatient Medications   Medication Sig Dispense Refill    rivaroxaban (XARELTO) 20 MG TABS tablet Take 20 mg by mouth Daily with supper      metoprolol succinate (TOPROL XL) 50 MG extended release tablet Take 50 mg by mouth daily      tamsulosin (FLOMAX) 0.4 MG capsule Take 0.4 mg by mouth daily      fenofibrate (TRICOR) 145 MG tablet Take 145 mg by mouth daily      Omega-3 Fatty Acids (FISH OIL) 1000 MG CAPS Take 3,000 mg by mouth 3 times daily      magnesium oxide (MAG-OX) 400 MG tablet Take 400 mg by mouth daily      oxyCODONE-acetaminophen (PERCOCET)  MG per tablet Take 1 tablet by mouth every 4 hours as needed for Pain for up to 30 days. Intended supply: 30 days 6 tablet 0    oxyCODONE-acetaminophen (PERCOCET) 5-325 MG per tablet Take 1 tablet by mouth every 6 hours as needed for Pain for up to 6 days. Intended supply: 3 days.  Take lowest dose possible to manage pain 25 tablet 0    methocarbamol (ROBAXIN-750) 750 MG tablet Take 1 tablet by mouth 3 times daily as needed (spasm) 20 tablet 0    POTASSIUM PO Take 1 tablet by mouth daily       ascorbic acid (VITAMIN C) 500 MG tablet Take 500 mg by mouth      celecoxib (CELEBREX) 200 MG capsule Take 200 mg by mouth 2 times daily      lisinopril-hydroCHLOROthiazide (PRINZIDE;ZESTORETIC) 20-25 MG per tablet Take 1 tablet by mouth daily       oxyCODONE (OXYCONTIN) 10 MG extended release tablet Take 10 mg by mouth every 12 hours.  oxyCODONE (ROXICODONE) 5 MG immediate release tablet Take 5 mg by mouth every 4 hours as needed.  warfarin (COUMADIN) 6 MG tablet Take 12 mg by mouth daily         Allergies: Allergies   Allergen Reactions    Meperidine Nausea And Vomiting    Morphine Nausea And Vomiting       Problem List:    Patient Active Problem List   Diagnosis Code    HTN (hypertension), benign I10    S/P prosthetic total arthroplasty of the hip N07.861    Diastolic dysfunction E36.48    Osteoarthritis of left hip M16.12    Tobacco abuse Z72.0    Closed 4-part fracture of surgical neck of right humerus S42.241A    Closed fracture of shaft of right humerus S42.301A    Degenerative joint disease of right acromioclavicular joint M19.011       Past Medical History:        Diagnosis Date    Arthritis     hips, back  osteo    Atrial fibrillation (HCC)     x 1 episode- takes metoprolol    Cancer ([address]     skin  left arm removed    DISH (diffuse idiopathic skeletal hyperostosis)     Followed by Rheum    Hypertension dx'd about 2008    controlled with meds    Osteoporosis     PE (pulmonary thromboembolism) ([address]     Sleep apnea     resolved after weight loss     SOB (shortness of breath) on exertion     Some SOB with just sitting    Thromboembolus ([address] 08, 12, AND 15+ yrs ago     ?  left leg X 3- xarelto       Past Surgical History:        Procedure Laterality Date    JOINT REPLACEMENT Left     Hip    LIPOMA RESECTION  as a teenager    left side    MULTIPLE TOOTH EXTRACTIONS      Al teeth removed    OTHER SURGICAL HISTORY      wisdom teeth removed    OTHER SURGICAL HISTORY      lipoma removed left side    TOTAL HIP ARTHROPLASTY  4/2010    right    WISDOM TOOTH EXTRACTION  as a teenager       Social History:    Social History     Tobacco Use    Smoking status: Former Smoker     Packs/day: 0.50     Years: 20.00     Pack years: 10.00     Quit date:      Years since quittin.5    Smokeless tobacco: Never Used    Tobacco comment: Quit smoking: quit about 2009   Substance Use Topics    Alcohol use: No                                Counseling given: Not Answered  Comment: Quit smoking: quit about 2009      Vital Signs (Current): There were no vitals filed for this visit. BP Readings from Last 3 Encounters:   22 124/72   07/10/22 (!) 110/55       NPO Status:                                                                                 BMI:   Wt Readings from Last 3 Encounters:   22 240 lb 1.6 oz (108.9 kg)   07/10/22 250 lb (113.4 kg)     There is no height or weight on file to calculate BMI.    CBC: No results found for: WBC, RBC, HGB, HCT, MCV, RDW, PLT    CMP: No results found for: NA, K, CL, CO2, BUN, CREATININE, GFRAA, AGRATIO, LABGLOM, GLUCOSE, GLU, PROT, CALCIUM, BILITOT, ALKPHOS, AST, ALT    POC Tests: No results for input(s): POCGLU, POCNA, POCK, POCCL, POCBUN, POCHEMO, POCHCT in the last 72 hours.     Coags: No results found for: PROTIME, INR, APTT    HCG (If Applicable): No results found for: PREGTESTUR, PREGSERUM, HCG, HCGQUANT     ABGs: No results found for: PHART, PO2ART, ZRU0ESP, ZWN0HDR, BEART, R6HKBRRO     Type & Screen (If Applicable):  No results found for: LABABO, LABRH    Drug/Infectious Status (If Applicable):  No results found for: HIV, HEPCAB    COVID-19 Screening (If Applicable): No results found for: COVID19        Anesthesia Evaluation  Patient summary reviewed and Nursing notes reviewed no history of anesthetic complications:   Airway: Mallampati: II  TM distance: >3 FB   Neck ROM: full  Mouth opening: > = 3 FB   Dental:    (+) edentulous      Pulmonary: breath sounds clear to auscultation  (+) shortness of breath: chronic,  sleep apnea (no treatment):      (-) not a current smoker (former 35 pack years)                          ROS comment: COVID 2/21 - no hospitalization   Cardiovascular:  Exercise tolerance: no interval change, Limited by back pain. Denied chest pain, syncope. Does have chronic IRVIN  (+) hypertension:, dysrhythmias (1 instance - on xarelto): atrial fibrillation, IRVIN: after ambulating 1 flight of stairs and no interval change,         Rhythm: regular  Rate: normal  Echocardiogram reviewed               ROS comment: ECHO '18 - preserved EF with no valv abn     Neuro/Psych:   Negative Neuro/Psych ROS              GI/Hepatic/Renal:   (+) GERD: well controlled,           Endo/Other:    (+) : arthritis:., .                 Abdominal:             Vascular:   + DVT (on xarelto ), .       Other Findings:           Anesthesia Plan      general     ASA 3     (GETA supplemented with PNB    No real change in sx. Patient has known SOA. Never been diagnosed with COPD although has 35 pack year history of smoking. Lungs CTAB today and SPO2 97-99% on RA. Patient appears optimized.  )  Induction: intravenous. Anesthetic plan and risks discussed with patient and spouse.               Post-op pain plan if not by surgeon: single peripheral nerve block            Óscar Carter MD   7/13/2022

## 2022-07-13 NOTE — PERIOP NOTE
PT elevated; will have anesthesia review. UA abnormal; lab results sent to surgeon. All other lab results within anesthesia guidelines. Results for Gilberto Michelle (MRN [id number]) as of 7/13/2022 13:03   Ref.  Range 7/13/2022 10:57   Sodium Latest Ref Range: 136 - 145 mmol/L 138   Potassium Latest Ref Range: 3.5 - 5.1 mmol/L 3.5   Chloride Latest Ref Range: 98 - 107 mmol/L 104   CO2 Latest Ref Range: 21 - 32 mmol/L 27   BUN,BUNPL Latest Ref Range: 8 - 23 MG/DL 16   Creatinine Latest Ref Range: 0.8 - 1.5 MG/DL 0.96   Anion Gap Latest Ref Range: 7 - 16 mmol/L 7   GFR Non- Latest Ref Range: >60 ml/min/1.73m2 >60   GFR African American Latest Ref Range: >60 ml/min/1.73m2 >60   Magnesium Latest Ref Range: 1.8 - 2.4 mg/dL 2.2   GLUCOSE, FASTING,GF Latest Ref Range: 65 - 100 mg/dL 125 (H)   CALCIUM, SERUM, 528991 Latest Ref Range: 8.3 - 10.4 MG/DL 9.5   ALBUMIN/GLOBULIN RATIO Latest Ref Range: 1.2 - 3.5   0.9 (L)   Total Protein Latest Ref Range: 6.3 - 8.2 g/dL 7.0   Albumin Latest Ref Range: 3.2 - 4.6 g/dL 3.4   Globulin Latest Ref Range: 2.3 - 3.5 g/dL 3.6 (H)   Alk Phosphatase Latest Ref Range: 50 - 136 U/L 46 (L)   ALT Latest Ref Range: 12 - 65 U/L 21   AST Latest Ref Range: 15 - 37 U/L 29   Bilirubin Latest Ref Range: 0.2 - 1.1 MG/DL 0.9   WBC Latest Ref Range: 4.3 - 11.1 K/uL 8.3   RBC Latest Ref Range: 4.23 - 5.6 M/uL 3.40 (L)   Hemoglobin Quant Latest Ref Range: 13.6 - 17.2 g/dL 10.6 (L)   Hematocrit Latest Ref Range: 41.1 - 50.3 % 31.0 (L)   MCV Latest Ref Range: 79.6 - 97.8 FL 91.2   MCH Latest Ref Range: 26.1 - 32.9 PG 31.2   MCHC Latest Ref Range: 31.4 - 35.0 g/dL 34.2   MPV Latest Ref Range: 9.4 - 12.3 FL 10.1   RDW Latest Ref Range: 11.9 - 14.6 % 13.1   Platelet Count Latest Ref Range: 150 - 450 K/uL 264   Absolute Mono # Latest Ref Range: 0.1 - 1.3 K/UL 0.9   Eosinophils % Latest Ref Range: 0.5 - 7.8 % 2   Basophils Absolute Latest Ref Range: 0.0 - 0.2 K/UL 0.1   Differential Type Latest Units:    AUTOMATED   Seg Neutrophils Latest Ref Range: 43 - 78 % 64   Segs Absolute Latest Ref Range: 1.7 - 8.2 K/UL 5.3   Lymphocytes Latest Ref Range: 13 - 44 % 23   Absolute Lymph # Latest Ref Range: 0.5 - 4.6 K/UL 1.9   Monocytes Latest Ref Range: 4.0 - 12.0 % 10   Absolute Eos # Latest Ref Range: 0.0 - 0.8 K/UL 0.1   Basophils Latest Ref Range: 0.0 - 2.0 % 1   Immature Granulocytes Latest Ref Range: 0.0 - 5.0 % 1   Nucleated Red Blood Cells Latest Ref Range: 0.0 - 0.2 K/uL 0.00   Absolute Immature Granulocyte Latest Ref Range: 0.0 - 0.5 K/UL 0.0   Prothrombin Time Latest Ref Range: 12.6 - 14.5 sec 16.2 (H)   INR Latest Units:   1.3   PTT Latest Ref Range: 24.1 - 35.1 SEC 30.5   Color, UA Latest Units:   DARK YELLOW   Glucose, UA Latest Units: mg/dL Negative   Bilirubin, Urine Latest Ref Range: NEG   Negative   Ketones, Urine Latest Ref Range: NEG mg/dL TRACE (A)   Specific [place], UA Latest Ref Range: 1.001 - 1.023   1.033 (H)   Blood, Urine Latest Ref Range: NEG   Negative   Protein, UA Latest Ref Range: NEG mg/dL TRACE (A)   Urobilinogen, Urine Latest Ref Range: 0.2 - 1.0 EU/dL 2.0 (H)   Nitrite, Urine Latest Ref Range: NEG   Negative   Leukocyte Esterase, Urine Latest Ref Range: NEG   SMALL (A)   Appearance Latest Units:   CLOUDY   pH, Urine Latest Ref Range: 5.0 - 9.0   7.0   Mucus, UA Latest Ref Range: 0 /lpf 1+ (H)   WBC, UA Latest Ref Range: 0 /hpf 3-5   RBC, UA Latest Ref Range: 0 /hpf 5-10   Epithelial Cells, UA Latest Ref Range: 0 /hpf 0-3   Bacteria, UA Latest Ref Range: 0 /hpf 1+ (H)

## 2022-07-13 NOTE — PROGRESS NOTES
Name: Bjorn Larios  YOB: 1950  Gender: male  MRN: [id number]      What: Right proximal humerus fracture  How: A fall going for a midnight snack  When: 7/10/2022 at 2 AM    Referring provider: Dr. Rocco Keller    HPI: Bjorn Larios is a 80 y.o. right-hand-dominant male seen at the request of Dr. Rocco Keller for right shoulder problems. He denies any previous right shoulder problems. He was going for a snack early Sunday morning when he fell injuring his right shoulder. He presented to the emergency room where radiographs demonstrated a complex right proximal humerus fracture. He was initially seen by Dr. Rocco Keller and referred to me due to the complexity of his right proximal humerus fracture. He has a history of hypertension, atrial fibrillation, obstructive sleep apnea, and PE/DVT on Xarelto. He has chronic low back pain currently on tramadol. He has significant COPD. He has had bilateral total hip arthroplasties by Dr. Haylee Reynoso. He has DISH. His DVTs have been in his left lower extremity. He is followed by [facility] cardiology. He has hypercholesterolemia. He notes that the right shoulder is incredibly painful. He has not had a bowel movement since his fracture. He has a history of severe nausea. He had a CT of his head neck and chest      ROS/Meds/PSH/PMH/FH/SH: A ten system review of systems was performed and is negative other than what is in the HPI. Tobacco:  reports that he has quit smoking. He smoked 0.50 packs per day. He has never used smokeless tobacco.  There were no vitals taken for this visit. Physical Examination:  He is an awake alert pleasant gentleman ambulating without difficulty    The left shoulder has 0 to 180 degrees of active and 0 to 180 degrees passive forward elevation. Internal rotation is to T6. External rotation is to 60 degrees at the side.    In the 90 degree abducted position 90 degrees of external and 90 degrees internal rotation  The AC joint is non-tender  SC joint is non-tender. Greater tuberosity is non-tender. negative biceps  Negative O'Briens sign  negative lift-off sign  Negative belly press sign  Negative bear huggers sign  negative drop sign  negative hornblower's sign  No posterior glenohumeral joint line tenderness. No evident excessive external rotation  Rotator cuff strength is 5/5.  negative external rotation stress test.   Negative empty can sign  There is no evident anterior or posterior apprehension with a negative sulcus sign. No instability  negative external and internal Rotation lag sign  Neurovascularly intact. The right shoulder is guarded in the sling  Exam is limited due to pain  He has marked bruising about his right shoulder and down his arm  He appears neurovascularly intact      Data Reviewed:    Multiple radiographs including his injury films from 7/10/2022 and 7/12/2022 were reviewed  Radiographs of the right shoulder demonstrate what appears to be a four-part equivalent right proximal humerus fracture involving the anatomic neck, marked comminution of the surgical neck, there is extension down the humeral shaft. There are degenerative changes in the [facility] joint    CT of the neck demonstrates no fracture. He has cervical spondylosis        Impression:   1. Closed 4-part fracture of surgical neck of right humerus, initial encounter    2. Other closed fracture of shaft of right humerus, initial encounter    3. Degenerative joint disease of right acromioclavicular joint       · Cervical spondylosis  · History of PE/DVT on Xarelto  · Atrial fibrillation  · Hypertension  · Hypercholesterolemia  · Obstructive sleep apnea  · COPD  · Chronic pain on tramadol  · Status post bilateral total hip arthroplasties  · DISH  · Obesity with a BMI over 45    Plan:   I discussed the problem with the patient. I discussed nonoperative versus operative intervention.   Given his fracture pattern in my opinion he has exhausted nonoperative modalities. He would be a candidate for a reverse right total shoulder arthroplasty with a delta xtend prosthesis biceps tenodesis. This would be performed utilizing general anesthesia with an interscalene block and I keep him overnight 23 hours for observation at a minimum. I would measure hemoglobin A1c and a fasting glucose. I would place a PPD and consider a short-term rehab stay. Given his significant pulmonary isssues he may not be a candidate for an interscalene block. We would be careful about his total hip arthroplasties. I discussed the risks and benefits of the procedure with him and expected outcomes. Given his hypercoagulable state, cardiac issues, pulmonary issues and obesity he is at significantly increased risk for postoperative complications. We had an extensive discussion today. He will be evaluated by the anesthesiologist and I have spoken to Dr. Sepdieh Paniagua.  If he feels the patient needs to be admitted to optimize his preoperative status we will do so otherwise we will proceed on Friday. We also may need to defer the block. I had an extensive discussion with the family and Dr. Sepideh Paniagua.  We will proceed as outlined above  5 This is an illness/condition that threatens bodily function    Follow up: No follow-ups on file.      S42.862  S42.391  m19.011        Jennifer Jeffers MD

## 2022-07-13 NOTE — PERIOP NOTE
PLEASE CONTINUE TAKING ALL PRESCRIPTION MEDICATIONS UP TO THE DAY OF SURGERY UNLESS OTHERWISE DIRECTED BELOW. DISCONTINUE all vitamins and supplements 7 days prior to surgery. DISCONTINUE Non-Steriodal Anti-Inflammatory (NSAIDS) such as Advil and Aleve 5 days prior to surgery. Home Medications to take  the day of surgery   Tamsulosin, oxycodone if needed, metoprolol, methocarbamol if needed            Home Medications   to Hold   Stop vitamins and supplements now         Comments   Bring Incentive spirometer and Julissa-hex wash to the hospital on the day of surgery. Please do not bring home medications with you on the day of surgery unless otherwise directed by your nurse. If you are instructed to bring home medications, please give them to your nurse as they will be administered by the nursing staff. If you have any questions, please call [facility] [phone]. A copy of this note was provided to the patient for reference.

## 2022-07-14 NOTE — PROGRESS NOTES
The lab results below are within anesthesia guidelines, except PT - anesthesia to review. Results for Junior Marques (MRN [id number]) as of 7/14/2022 07:38   Ref.  Range 7/13/2022 10:57   Sodium Latest Ref Range: 136 - 145 mmol/L 138   Potassium Latest Ref Range: 3.5 - 5.1 mmol/L 3.5   Chloride Latest Ref Range: 98 - 107 mmol/L 104   CO2 Latest Ref Range: 21 - 32 mmol/L 27   BUN,BUNPL Latest Ref Range: 8 - 23 MG/DL 16   Creatinine Latest Ref Range: 0.8 - 1.5 MG/DL 0.96   Anion Gap Latest Ref Range: 7 - 16 mmol/L 7   GFR Non- Latest Ref Range: >60 ml/min/1.73m2 >60   GFR African American Latest Ref Range: >60 ml/min/1.73m2 >60   Magnesium Latest Ref Range: 1.8 - 2.4 mg/dL 2.2   GLUCOSE, FASTING,GF Latest Ref Range: 65 - 100 mg/dL 125 (H)   CALCIUM, SERUM, [id number] Latest Ref Range: 8.3 - 10.4 MG/DL 9.5   ALBUMIN/GLOBULIN RATIO Latest Ref Range: 1.2 - 3.5   0.9 (L)   Total Protein Latest Ref Range: 6.3 - 8.2 g/dL 7.0   Albumin Latest Ref Range: 3.2 - 4.6 g/dL 3.4   Globulin Latest Ref Range: 2.3 - 3.5 g/dL 3.6 (H)   Alk Phosphatase Latest Ref Range: 50 - 136 U/L 46 (L)   ALT Latest Ref Range: 12 - 65 U/L 21   AST Latest Ref Range: 15 - 37 U/L 29   Bilirubin Latest Ref Range: 0.2 - 1.1 MG/DL 0.9   WBC Latest Ref Range: 4.3 - 11.1 K/uL 8.3   RBC Latest Ref Range: 4.23 - 5.6 M/uL 3.40 (L)   Hemoglobin Quant Latest Ref Range: 13.6 - 17.2 g/dL 10.6 (L)   Hematocrit Latest Ref Range: 41.1 - 50.3 % 31.0 (L)   MCV Latest Ref Range: 79.6 - 97.8 FL 91.2   MCH Latest Ref Range: 26.1 - 32.9 PG 31.2   MCHC Latest Ref Range: 31.4 - 35.0 g/dL 34.2   MPV Latest Ref Range: 9.4 - 12.3 FL 10.1   RDW Latest Ref Range: 11.9 - 14.6 % 13.1   Platelet Count Latest Ref Range: 150 - 450 K/uL 264   Absolute Mono # Latest Ref Range: 0.1 - 1.3 K/UL 0.9   Eosinophils % Latest Ref Range: 0.5 - 7.8 % 2   Basophils Absolute Latest Ref Range: 0.0 - 0.2 K/UL 0.1   Differential Type Latest Units:   AUTOMATED   Seg Neutrophils Latest Ref Range: 43 - 78 % 64   Segs Absolute Latest Ref Range: 1.7 - 8.2 K/UL 5.3   Lymphocytes Latest Ref Range: 13 - 44 % 23   Absolute Lymph # Latest Ref Range: 0.5 - 4.6 K/UL 1.9   Monocytes Latest Ref Range: 4.0 - 12.0 % 10   Absolute Eos # Latest Ref Range: 0.0 - 0.8 K/UL 0.1   Basophils Latest Ref Range: 0.0 - 2.0 % 1   Immature Granulocytes Latest Ref Range: 0.0 - 5.0 % 1   Nucleated Red Blood Cells Latest Ref Range: 0.0 - 0.2 K/uL 0.00   Absolute Immature Granulocyte Latest Ref Range: 0.0 - 0.5 K/UL 0.0   Prothrombin Time Latest Ref Range: 12.6 - 14.5 sec 16.2 (H)   INR Latest Units:   1.3   PTT Latest Ref Range: 24.1 - 35.1 SEC 30.5   Color, UA Latest Units:   DARK YELLOW   Glucose, UA Latest Units: mg/dL Negative   Bilirubin, Urine Latest Ref Range: NEG   Negative   Ketones, Urine Latest Ref Range: NEG mg/dL TRACE (A)   Specific [place], UA Latest Ref Range: 1.001 - 1.023   1.033 (H)   Blood, Urine Latest Ref Range: NEG   Negative   Protein, UA Latest Ref Range: NEG mg/dL TRACE (A)   Urobilinogen, Urine Latest Ref Range: 0.2 - 1.0 EU/dL 2.0 (H)   Nitrite, Urine Latest Ref Range: NEG   Negative   Leukocyte Esterase, Urine Latest Ref Range: NEG   SMALL (A)   Appearance Latest Units:   CLOUDY   pH, Urine Latest Ref Range: 5.0 - 9.0   7.0   Mucus, UA Latest Ref Range: 0 /lpf 1+ (H)   WBC, UA Latest Ref Range: 0 /hpf 3-5   RBC, UA Latest Ref Range: 0 /hpf 5-10   Epithelial Cells, UA Latest Ref Range: 0 /hpf 0-3   Bacteria, UA Latest Ref Range: 0 /hpf 1+ (H)   ABO Rh Unknown A POSITIVE   Antibody Screen Unknown NEG   Crossmatch expiration date Unknown 07/16/2022,2359   MSSA/MRSA SCREEN BY PCR Unknown Rpt   Special Requests Latest Units:   NO SPECIAL REQUESTS

## 2022-07-15 ENCOUNTER — ANESTHESIA (OUTPATIENT)
Dept: SURGERY | Age: 72
DRG: 483 | End: 2022-07-15
Payer: MEDICARE

## 2022-07-15 ENCOUNTER — APPOINTMENT (OUTPATIENT)
Dept: GENERAL RADIOLOGY | Age: 72
DRG: 483 | End: 2022-07-15
Attending: ORTHOPAEDIC SURGERY
Payer: MEDICARE

## 2022-07-15 ENCOUNTER — HOSPITAL ENCOUNTER (INPATIENT)
Age: 72
LOS: 3 days | Discharge: HOME HEALTH | DRG: 483 | End: 2022-07-18
Attending: ORTHOPAEDIC SURGERY | Admitting: ORTHOPAEDIC SURGERY
Payer: MEDICARE

## 2022-07-15 DIAGNOSIS — S42.241D CLOSED 4-PART FRACTURE OF SURGICAL NECK OF RIGHT HUMERUS WITH ROUTINE HEALING, SUBSEQUENT ENCOUNTER: Primary | ICD-10-CM

## 2022-07-15 DIAGNOSIS — M19.011 DEGENERATIVE JOINT DISEASE OF RIGHT ACROMIOCLAVICULAR JOINT: ICD-10-CM

## 2022-07-15 PROBLEM — F11.90 NARCOTIC DRUG USE: Status: ACTIVE | Noted: 2017-10-16

## 2022-07-15 PROBLEM — G47.33 OSA (OBSTRUCTIVE SLEEP APNEA): Status: ACTIVE | Noted: 2019-04-24

## 2022-07-15 PROBLEM — S42.241A CLOSED 4-PART FRACTURE OF SURGICAL NECK OF RIGHT HUMERUS, INITIAL ENCOUNTER: Status: ACTIVE | Noted: 2022-07-15

## 2022-07-15 PROBLEM — I48.0 PAROXYSMAL ATRIAL FIBRILLATION (HCC): Status: ACTIVE | Noted: 2019-12-23

## 2022-07-15 LAB
ANION GAP SERPL CALC-SCNC: 5 MMOL/L (ref 7–16)
BASOPHILS # BLD: 0.1 K/UL (ref 0–0.2)
BASOPHILS NFR BLD: 1 % (ref 0–2)
BUN SERPL-MCNC: 20 MG/DL (ref 8–23)
CALCIUM SERPL-MCNC: 8.5 MG/DL (ref 8.3–10.4)
CHLORIDE SERPL-SCNC: 104 MMOL/L (ref 98–107)
CO2 SERPL-SCNC: 27 MMOL/L (ref 21–32)
CREAT SERPL-MCNC: 0.93 MG/DL (ref 0.8–1.5)
DIFFERENTIAL METHOD BLD: ABNORMAL
EOSINOPHIL # BLD: 0.1 K/UL (ref 0–0.8)
EOSINOPHIL NFR BLD: 1 % (ref 0.5–7.8)
ERYTHROCYTE [DISTWIDTH] IN BLOOD BY AUTOMATED COUNT: 13.5 % (ref 11.9–14.6)
GLUCOSE SERPL-MCNC: 144 MG/DL (ref 65–100)
HCT VFR BLD AUTO: 26.8 % (ref 41.1–50.3)
HGB BLD-MCNC: 9 G/DL (ref 13.6–17.2)
IMM GRANULOCYTES # BLD AUTO: 0.1 K/UL (ref 0–0.5)
IMM GRANULOCYTES NFR BLD AUTO: 1 % (ref 0–5)
LYMPHOCYTES # BLD: 1.1 K/UL (ref 0.5–4.6)
LYMPHOCYTES NFR BLD: 11 % (ref 13–44)
MAGNESIUM SERPL-MCNC: 2.2 MG/DL (ref 1.8–2.4)
MCH RBC QN AUTO: 31.8 PG (ref 26.1–32.9)
MCHC RBC AUTO-ENTMCNC: 33.6 G/DL (ref 31.4–35)
MCV RBC AUTO: 94.7 FL (ref 79.6–97.8)
MONOCYTES # BLD: 0.6 K/UL (ref 0.1–1.3)
MONOCYTES NFR BLD: 6 % (ref 4–12)
NEUTS SEG # BLD: 8 K/UL (ref 1.7–8.2)
NEUTS SEG NFR BLD: 80 % (ref 43–78)
NRBC # BLD: 0 K/UL (ref 0–0.2)
PLATELET # BLD AUTO: 275 K/UL (ref 150–450)
PMV BLD AUTO: 9.5 FL (ref 9.4–12.3)
POTASSIUM SERPL-SCNC: 3.7 MMOL/L (ref 3.5–5.1)
RBC # BLD AUTO: 2.83 M/UL (ref 4.23–5.6)
SODIUM SERPL-SCNC: 136 MMOL/L (ref 136–145)
WBC # BLD AUTO: 10 K/UL (ref 4.3–11.1)

## 2022-07-15 PROCEDURE — 2500000003 HC RX 250 WO HCPCS: Performed by: NURSE ANESTHETIST, CERTIFIED REGISTERED

## 2022-07-15 PROCEDURE — 80048 BASIC METABOLIC PNL TOTAL CA: CPT

## 2022-07-15 PROCEDURE — 3700000001 HC ADD 15 MINUTES (ANESTHESIA): Performed by: ORTHOPAEDIC SURGERY

## 2022-07-15 PROCEDURE — 6370000000 HC RX 637 (ALT 250 FOR IP): Performed by: ANESTHESIOLOGY

## 2022-07-15 PROCEDURE — 2580000003 HC RX 258: Performed by: ANESTHESIOLOGY

## 2022-07-15 PROCEDURE — 64415 NJX AA&/STRD BRCH PLXS IMG: CPT | Performed by: ANESTHESIOLOGY

## 2022-07-15 PROCEDURE — 6360000002 HC RX W HCPCS: Performed by: ANESTHESIOLOGY

## 2022-07-15 PROCEDURE — 2709999900 HC NON-CHARGEABLE SUPPLY: Performed by: ORTHOPAEDIC SURGERY

## 2022-07-15 PROCEDURE — C1776 JOINT DEVICE (IMPLANTABLE): HCPCS | Performed by: ORTHOPAEDIC SURGERY

## 2022-07-15 PROCEDURE — 1100000000 HC RM PRIVATE

## 2022-07-15 PROCEDURE — 6360000002 HC RX W HCPCS: Performed by: NURSE ANESTHETIST, CERTIFIED REGISTERED

## 2022-07-15 PROCEDURE — 3E0T3BZ INTRODUCTION OF ANESTHETIC AGENT INTO PERIPHERAL NERVES AND PLEXI, PERCUTANEOUS APPROACH: ICD-10-PCS | Performed by: ORTHOPAEDIC SURGERY

## 2022-07-15 PROCEDURE — 6370000000 HC RX 637 (ALT 250 FOR IP): Performed by: ORTHOPAEDIC SURGERY

## 2022-07-15 PROCEDURE — 0RRJ00Z REPLACEMENT OF RIGHT SHOULDER JOINT WITH REVERSE BALL AND SOCKET SYNTHETIC SUBSTITUTE, OPEN APPROACH: ICD-10-PCS | Performed by: ORTHOPAEDIC SURGERY

## 2022-07-15 PROCEDURE — G0378 HOSPITAL OBSERVATION PER HR: HCPCS

## 2022-07-15 PROCEDURE — 85025 COMPLETE CBC W/AUTO DIFF WBC: CPT

## 2022-07-15 PROCEDURE — 7100000001 HC PACU RECOVERY - ADDTL 15 MIN: Performed by: ORTHOPAEDIC SURGERY

## 2022-07-15 PROCEDURE — 7100000000 HC PACU RECOVERY - FIRST 15 MIN: Performed by: ORTHOPAEDIC SURGERY

## 2022-07-15 PROCEDURE — 23472 RECONSTRUCT SHOULDER JOINT: CPT | Performed by: ORTHOPAEDIC SURGERY

## 2022-07-15 PROCEDURE — 6360000002 HC RX W HCPCS: Performed by: ORTHOPAEDIC SURGERY

## 2022-07-15 PROCEDURE — 3700000000 HC ANESTHESIA ATTENDED CARE: Performed by: ORTHOPAEDIC SURGERY

## 2022-07-15 PROCEDURE — 2580000003 HC RX 258: Performed by: ORTHOPAEDIC SURGERY

## 2022-07-15 PROCEDURE — 2500000003 HC RX 250 WO HCPCS: Performed by: ANESTHESIOLOGY

## 2022-07-15 PROCEDURE — 83735 ASSAY OF MAGNESIUM: CPT

## 2022-07-15 PROCEDURE — 73030 X-RAY EXAM OF SHOULDER: CPT

## 2022-07-15 PROCEDURE — 3600000015 HC SURGERY LEVEL 5 ADDTL 15MIN: Performed by: ORTHOPAEDIC SURGERY

## 2022-07-15 PROCEDURE — 3600000005 HC SURGERY LEVEL 5 BASE: Performed by: ORTHOPAEDIC SURGERY

## 2022-07-15 PROCEDURE — C1713 ANCHOR/SCREW BN/BN,TIS/BN: HCPCS | Performed by: ORTHOPAEDIC SURGERY

## 2022-07-15 DEVICE — DELTA XTEND MONOBLOC HUM CEMENTED EPIPHYSIS SZ2 /DIA12 STD
Type: IMPLANTABLE DEVICE | Site: SHOULDER | Status: FUNCTIONAL
Brand: DELTA XTEND

## 2022-07-15 DEVICE — BIOSTOP G BIORESORBABLE CEMENT RESTRICTOR SIZE 12
Type: IMPLANTABLE DEVICE | Site: SHOULDER | Status: FUNCTIONAL
Brand: BIOSTOP

## 2022-07-15 DEVICE — DELTA XTEND STANDARD HUMERAL PE CUP DIA38 /+9MM STD
Type: IMPLANTABLE DEVICE | Site: SHOULDER | Status: FUNCTIONAL
Brand: DELTA XTEND

## 2022-07-15 DEVICE — DELTA XTEND LATERALIZED GLENOSPHERE +8MM ECCENTRIC 038MM
Type: IMPLANTABLE DEVICE | Site: SHOULDER | Status: FUNCTIONAL
Brand: DELTA XTEND

## 2022-07-15 DEVICE — DELTA XTEND REVERSE SHOULDER SYSTEM CENTRAL METAGLENE SCREW 6MM X 35MM
Type: IMPLANTABLE DEVICE | Site: SHOULDER | Status: FUNCTIONAL
Brand: DELTA XTEND

## 2022-07-15 DEVICE — TOBRA FULL DOSE ANTIBIOTIC BONE CEMENT, 10 PACK CATALOG NUMBER IS 6197-9-010
Type: IMPLANTABLE DEVICE | Site: SHOULDER | Status: FUNCTIONAL
Brand: SIMPLEX

## 2022-07-15 DEVICE — DELTA XTEND REVERSE SHOULDER SYSTEM AUGMENTED CENTRAL SCREW METAGLENE 4MM LATERALIZED
Type: IMPLANTABLE DEVICE | Site: SHOULDER | Status: FUNCTIONAL
Brand: DELTA XTEND

## 2022-07-15 DEVICE — SCREW BNE L42MM DIA4.5MM PROX CORT TIB S STL ST LOK FULL: Type: IMPLANTABLE DEVICE | Site: SHOULDER | Status: FUNCTIONAL

## 2022-07-15 DEVICE — SCREW BNE L50MM DIA4.5MM PROX CORT TIB S STL ST LOK FULL: Type: IMPLANTABLE DEVICE | Site: SHOULDER | Status: FUNCTIONAL

## 2022-07-15 DEVICE — SCREW BNE L20MM DIA4.5MM PROX CORT TIB S STL ST LOK FULL: Type: IMPLANTABLE DEVICE | Site: SHOULDER | Status: FUNCTIONAL

## 2022-07-15 DEVICE — DELTA XTEND HUMERAL SPACER / +9MM
Type: IMPLANTABLE DEVICE | Site: SHOULDER | Status: FUNCTIONAL
Brand: DELTA XTEND

## 2022-07-15 DEVICE — DELTA XTEND REVERSE SHOULDER SYSTEM CENTRAL SCREW METAGLENE COLLET IMPLANT: TITANIUM / HANDLE: POLYCARBONATE
Type: IMPLANTABLE DEVICE | Site: SHOULDER | Status: FUNCTIONAL
Brand: DELTA XTEND

## 2022-07-15 RX ORDER — DIPHENHYDRAMINE HYDROCHLORIDE 50 MG/ML
12.5 INJECTION INTRAMUSCULAR; INTRAVENOUS
Status: DISCONTINUED | OUTPATIENT
Start: 2022-07-15 | End: 2022-07-15 | Stop reason: HOSPADM

## 2022-07-15 RX ORDER — PROCHLORPERAZINE EDISYLATE 5 MG/ML
5 INJECTION INTRAMUSCULAR; INTRAVENOUS
Status: DISCONTINUED | OUTPATIENT
Start: 2022-07-15 | End: 2022-07-15 | Stop reason: HOSPADM

## 2022-07-15 RX ORDER — FENTANYL CITRATE 50 UG/ML
100 INJECTION, SOLUTION INTRAMUSCULAR; INTRAVENOUS
Status: COMPLETED | OUTPATIENT
Start: 2022-07-15 | End: 2022-07-15

## 2022-07-15 RX ORDER — GLUCAGON 1 MG/ML
1 KIT INJECTION PRN
Status: DISCONTINUED | OUTPATIENT
Start: 2022-07-15 | End: 2022-07-15 | Stop reason: HOSPADM

## 2022-07-15 RX ORDER — ACETAMINOPHEN 500 MG
1000 TABLET ORAL ONCE
Status: COMPLETED | OUTPATIENT
Start: 2022-07-15 | End: 2022-07-15

## 2022-07-15 RX ORDER — ONDANSETRON 4 MG/1
4 TABLET, ORALLY DISINTEGRATING ORAL EVERY 8 HOURS PRN
Status: DISCONTINUED | OUTPATIENT
Start: 2022-07-15 | End: 2022-07-18 | Stop reason: HOSPADM

## 2022-07-15 RX ORDER — SODIUM CHLORIDE 0.9 % (FLUSH) 0.9 %
5-40 SYRINGE (ML) INJECTION EVERY 12 HOURS SCHEDULED
Status: DISCONTINUED | OUTPATIENT
Start: 2022-07-15 | End: 2022-07-15 | Stop reason: HOSPADM

## 2022-07-15 RX ORDER — SODIUM CHLORIDE 0.9 % (FLUSH) 0.9 %
5-40 SYRINGE (ML) INJECTION PRN
Status: DISCONTINUED | OUTPATIENT
Start: 2022-07-15 | End: 2022-07-15 | Stop reason: HOSPADM

## 2022-07-15 RX ORDER — PROPOFOL 10 MG/ML
INJECTION, EMULSION INTRAVENOUS PRN
Status: DISCONTINUED | OUTPATIENT
Start: 2022-07-15 | End: 2022-07-15 | Stop reason: SDUPTHER

## 2022-07-15 RX ORDER — FENTANYL CITRATE 50 UG/ML
INJECTION, SOLUTION INTRAMUSCULAR; INTRAVENOUS PRN
Status: DISCONTINUED | OUTPATIENT
Start: 2022-07-15 | End: 2022-07-15 | Stop reason: SDUPTHER

## 2022-07-15 RX ORDER — ONDANSETRON 2 MG/ML
INJECTION INTRAMUSCULAR; INTRAVENOUS PRN
Status: DISCONTINUED | OUTPATIENT
Start: 2022-07-15 | End: 2022-07-15 | Stop reason: SDUPTHER

## 2022-07-15 RX ORDER — BISACODYL 10 MG
10 SUPPOSITORY, RECTAL RECTAL DAILY PRN
Status: DISCONTINUED | OUTPATIENT
Start: 2022-07-15 | End: 2022-07-18 | Stop reason: HOSPADM

## 2022-07-15 RX ORDER — SODIUM CHLORIDE 0.9 % (FLUSH) 0.9 %
5-40 SYRINGE (ML) INJECTION PRN
Status: DISCONTINUED | OUTPATIENT
Start: 2022-07-15 | End: 2022-07-15

## 2022-07-15 RX ORDER — MIDAZOLAM HYDROCHLORIDE 2 MG/2ML
2 INJECTION, SOLUTION INTRAMUSCULAR; INTRAVENOUS
Status: COMPLETED | OUTPATIENT
Start: 2022-07-15 | End: 2022-07-15

## 2022-07-15 RX ORDER — ROPIVACAINE HYDROCHLORIDE 5 MG/ML
INJECTION, SOLUTION EPIDURAL; INFILTRATION; PERINEURAL
Status: COMPLETED | OUTPATIENT
Start: 2022-07-15 | End: 2022-07-15

## 2022-07-15 RX ORDER — NEOSTIGMINE METHYLSULFATE 1 MG/ML
INJECTION, SOLUTION INTRAVENOUS PRN
Status: DISCONTINUED | OUTPATIENT
Start: 2022-07-15 | End: 2022-07-15 | Stop reason: SDUPTHER

## 2022-07-15 RX ORDER — ROCURONIUM BROMIDE 10 MG/ML
INJECTION, SOLUTION INTRAVENOUS PRN
Status: DISCONTINUED | OUTPATIENT
Start: 2022-07-15 | End: 2022-07-15 | Stop reason: SDUPTHER

## 2022-07-15 RX ORDER — SODIUM CHLORIDE 9 MG/ML
INJECTION, SOLUTION INTRAVENOUS PRN
Status: DISCONTINUED | OUTPATIENT
Start: 2022-07-15 | End: 2022-07-18 | Stop reason: HOSPADM

## 2022-07-15 RX ORDER — HYDROMORPHONE HYDROCHLORIDE 2 MG/1
2 TABLET ORAL
Status: DISCONTINUED | OUTPATIENT
Start: 2022-07-15 | End: 2022-07-18 | Stop reason: HOSPADM

## 2022-07-15 RX ORDER — HYDROMORPHONE HYDROCHLORIDE 1 MG/ML
0.5 INJECTION, SOLUTION INTRAMUSCULAR; INTRAVENOUS; SUBCUTANEOUS EVERY 10 MIN PRN
Status: COMPLETED | OUTPATIENT
Start: 2022-07-15 | End: 2022-07-15

## 2022-07-15 RX ORDER — FENOFIBRATE 160 MG/1
160 TABLET ORAL DAILY
Status: DISCONTINUED | OUTPATIENT
Start: 2022-07-16 | End: 2022-07-18 | Stop reason: HOSPADM

## 2022-07-15 RX ORDER — METOPROLOL SUCCINATE 50 MG/1
50 TABLET, EXTENDED RELEASE ORAL DAILY
Status: DISCONTINUED | OUTPATIENT
Start: 2022-07-15 | End: 2022-07-18 | Stop reason: HOSPADM

## 2022-07-15 RX ORDER — PROMETHAZINE HYDROCHLORIDE 25 MG/1
25 TABLET ORAL EVERY 4 HOURS PRN
Status: DISCONTINUED | OUTPATIENT
Start: 2022-07-15 | End: 2022-07-18 | Stop reason: HOSPADM

## 2022-07-15 RX ORDER — SODIUM CHLORIDE 9 MG/ML
INJECTION, SOLUTION INTRAVENOUS PRN
Status: DISCONTINUED | OUTPATIENT
Start: 2022-07-15 | End: 2022-07-15 | Stop reason: HOSPADM

## 2022-07-15 RX ORDER — DEXTROSE MONOHYDRATE 50 MG/ML
100 INJECTION, SOLUTION INTRAVENOUS PRN
Status: DISCONTINUED | OUTPATIENT
Start: 2022-07-15 | End: 2022-07-15 | Stop reason: HOSPADM

## 2022-07-15 RX ORDER — LISINOPRIL AND HYDROCHLOROTHIAZIDE 25; 20 MG/1; MG/1
1 TABLET ORAL DAILY
Status: DISCONTINUED | OUTPATIENT
Start: 2022-07-16 | End: 2022-07-18 | Stop reason: HOSPADM

## 2022-07-15 RX ORDER — CHLORZOXAZONE 500 MG/1
1 TABLET ORAL NIGHTLY
COMMUNITY
Start: 2022-07-05

## 2022-07-15 RX ORDER — SODIUM PHOSPHATE, DIBASIC AND SODIUM PHOSPHATE, MONOBASIC 7; 19 G/133ML; G/133ML
1 ENEMA RECTAL
Status: DISPENSED | OUTPATIENT
Start: 2022-07-15 | End: 2022-07-15

## 2022-07-15 RX ORDER — GLYCOPYRROLATE 0.2 MG/ML
INJECTION INTRAMUSCULAR; INTRAVENOUS PRN
Status: DISCONTINUED | OUTPATIENT
Start: 2022-07-15 | End: 2022-07-15 | Stop reason: SDUPTHER

## 2022-07-15 RX ORDER — DEXAMETHASONE SODIUM PHOSPHATE 10 MG/ML
INJECTION INTRAMUSCULAR; INTRAVENOUS PRN
Status: DISCONTINUED | OUTPATIENT
Start: 2022-07-15 | End: 2022-07-15 | Stop reason: SDUPTHER

## 2022-07-15 RX ORDER — SODIUM CHLORIDE 0.9 % (FLUSH) 0.9 %
5-40 SYRINGE (ML) INJECTION EVERY 12 HOURS SCHEDULED
Status: DISCONTINUED | OUTPATIENT
Start: 2022-07-15 | End: 2022-07-15

## 2022-07-15 RX ORDER — FERROUS SULFATE 325(65) MG
325 TABLET ORAL 2 TIMES DAILY WITH MEALS
Status: DISCONTINUED | OUTPATIENT
Start: 2022-07-16 | End: 2022-07-18 | Stop reason: HOSPADM

## 2022-07-15 RX ORDER — TAMSULOSIN HYDROCHLORIDE 0.4 MG/1
0.4 CAPSULE ORAL DAILY
Status: DISCONTINUED | OUTPATIENT
Start: 2022-07-16 | End: 2022-07-18 | Stop reason: HOSPADM

## 2022-07-15 RX ORDER — SODIUM CHLORIDE, SODIUM LACTATE, POTASSIUM CHLORIDE, CALCIUM CHLORIDE 600; 310; 30; 20 MG/100ML; MG/100ML; MG/100ML; MG/100ML
INJECTION, SOLUTION INTRAVENOUS CONTINUOUS
Status: DISCONTINUED | OUTPATIENT
Start: 2022-07-15 | End: 2022-07-15

## 2022-07-15 RX ORDER — SODIUM CHLORIDE 0.9 % (FLUSH) 0.9 %
5-40 SYRINGE (ML) INJECTION EVERY 12 HOURS SCHEDULED
Status: DISCONTINUED | OUTPATIENT
Start: 2022-07-15 | End: 2022-07-18 | Stop reason: HOSPADM

## 2022-07-15 RX ORDER — SODIUM CHLORIDE 0.9 % (FLUSH) 0.9 %
5-40 SYRINGE (ML) INJECTION PRN
Status: DISCONTINUED | OUTPATIENT
Start: 2022-07-15 | End: 2022-07-18 | Stop reason: HOSPADM

## 2022-07-15 RX ORDER — LIDOCAINE HYDROCHLORIDE 20 MG/ML
INJECTION, SOLUTION EPIDURAL; INFILTRATION; INTRACAUDAL; PERINEURAL PRN
Status: DISCONTINUED | OUTPATIENT
Start: 2022-07-15 | End: 2022-07-15 | Stop reason: SDUPTHER

## 2022-07-15 RX ORDER — DOCUSATE SODIUM 100 MG/1
100 CAPSULE, LIQUID FILLED ORAL 2 TIMES DAILY
Status: DISCONTINUED | OUTPATIENT
Start: 2022-07-16 | End: 2022-07-18 | Stop reason: HOSPADM

## 2022-07-15 RX ORDER — SODIUM CHLORIDE, SODIUM LACTATE, POTASSIUM CHLORIDE, CALCIUM CHLORIDE 600; 310; 30; 20 MG/100ML; MG/100ML; MG/100ML; MG/100ML
INJECTION, SOLUTION INTRAVENOUS CONTINUOUS
Status: DISCONTINUED | OUTPATIENT
Start: 2022-07-15 | End: 2022-07-15 | Stop reason: HOSPADM

## 2022-07-15 RX ORDER — HYDROMORPHONE HYDROCHLORIDE 1 MG/ML
1 INJECTION, SOLUTION INTRAMUSCULAR; INTRAVENOUS; SUBCUTANEOUS
Status: DISCONTINUED | OUTPATIENT
Start: 2022-07-15 | End: 2022-07-18

## 2022-07-15 RX ORDER — HYDROMORPHONE HYDROCHLORIDE 4 MG/1
4 TABLET ORAL
Status: DISCONTINUED | OUTPATIENT
Start: 2022-07-15 | End: 2022-07-18 | Stop reason: HOSPADM

## 2022-07-15 RX ORDER — OXYCODONE HYDROCHLORIDE 5 MG/1
5 TABLET ORAL
Status: DISCONTINUED | OUTPATIENT
Start: 2022-07-15 | End: 2022-07-15 | Stop reason: HOSPADM

## 2022-07-15 RX ADMIN — ACETAMINOPHEN 1000 MG: 500 TABLET, FILM COATED ORAL at 11:10

## 2022-07-15 RX ADMIN — GLYCOPYRROLATE 0.4 MG: 0.2 INJECTION, SOLUTION INTRAMUSCULAR; INTRAVENOUS at 13:37

## 2022-07-15 RX ADMIN — FENTANYL CITRATE 50 MCG: 50 INJECTION INTRAMUSCULAR; INTRAVENOUS at 11:26

## 2022-07-15 RX ADMIN — ROCURONIUM BROMIDE 50 MG: 50 INJECTION, SOLUTION INTRAVENOUS at 11:39

## 2022-07-15 RX ADMIN — HYDROMORPHONE HYDROCHLORIDE 4 MG: 4 TABLET ORAL at 18:20

## 2022-07-15 RX ADMIN — LIDOCAINE HYDROCHLORIDE 60 MG: 20 INJECTION, SOLUTION EPIDURAL; INFILTRATION; INTRACAUDAL; PERINEURAL at 11:39

## 2022-07-15 RX ADMIN — PROMETHAZINE HYDROCHLORIDE 25 MG: 25 TABLET ORAL at 22:36

## 2022-07-15 RX ADMIN — PHENYLEPHRINE HYDROCHLORIDE 50 MCG: 0.1 INJECTION, SOLUTION INTRAVENOUS at 13:05

## 2022-07-15 RX ADMIN — ONDANSETRON 4 MG: 2 INJECTION INTRAMUSCULAR; INTRAVENOUS at 13:06

## 2022-07-15 RX ADMIN — ROCURONIUM BROMIDE 10 MG: 50 INJECTION, SOLUTION INTRAVENOUS at 12:22

## 2022-07-15 RX ADMIN — FENTANYL CITRATE 50 MCG: 50 INJECTION INTRAMUSCULAR; INTRAVENOUS at 12:09

## 2022-07-15 RX ADMIN — PROPOFOL 200 MG: 10 INJECTION, EMULSION INTRAVENOUS at 11:39

## 2022-07-15 RX ADMIN — MIDAZOLAM 1 MG: 1 INJECTION, SOLUTION INTRAMUSCULAR; INTRAVENOUS at 11:26

## 2022-07-15 RX ADMIN — ROCURONIUM BROMIDE 10 MG: 50 INJECTION, SOLUTION INTRAVENOUS at 12:53

## 2022-07-15 RX ADMIN — RIVAROXABAN 20 MG: 20 TABLET, FILM COATED ORAL at 18:20

## 2022-07-15 RX ADMIN — PHENYLEPHRINE HYDROCHLORIDE 50 MCG: 0.1 INJECTION, SOLUTION INTRAVENOUS at 12:18

## 2022-07-15 RX ADMIN — HYDROMORPHONE HYDROCHLORIDE 0.5 MG: 1 INJECTION, SOLUTION INTRAMUSCULAR; INTRAVENOUS; SUBCUTANEOUS at 14:20

## 2022-07-15 RX ADMIN — PHENYLEPHRINE HYDROCHLORIDE 50 MCG: 0.1 INJECTION, SOLUTION INTRAVENOUS at 12:35

## 2022-07-15 RX ADMIN — HYDROMORPHONE HYDROCHLORIDE 0.5 MG: 1 INJECTION, SOLUTION INTRAMUSCULAR; INTRAVENOUS; SUBCUTANEOUS at 14:32

## 2022-07-15 RX ADMIN — Medication 3 MG: at 13:37

## 2022-07-15 RX ADMIN — PHENYLEPHRINE HYDROCHLORIDE 50 MCG: 0.1 INJECTION, SOLUTION INTRAVENOUS at 13:20

## 2022-07-15 RX ADMIN — SODIUM CHLORIDE, SODIUM LACTATE, POTASSIUM CHLORIDE, AND CALCIUM CHLORIDE: 600; 310; 30; 20 INJECTION, SOLUTION INTRAVENOUS at 12:22

## 2022-07-15 RX ADMIN — LIDOCAINE HYDROCHLORIDE 1 ML: 10 INJECTION, SOLUTION INFILTRATION; PERINEURAL at 11:12

## 2022-07-15 RX ADMIN — CEFAZOLIN 1000 MG: 1 INJECTION, POWDER, FOR SOLUTION INTRAMUSCULAR; INTRAVENOUS at 20:47

## 2022-07-15 RX ADMIN — HYDROMORPHONE HYDROCHLORIDE 4 MG: 4 TABLET ORAL at 22:31

## 2022-07-15 RX ADMIN — PHENYLEPHRINE HYDROCHLORIDE 100 MCG: 0.1 INJECTION, SOLUTION INTRAVENOUS at 11:44

## 2022-07-15 RX ADMIN — HYDROMORPHONE HYDROCHLORIDE 0.5 MG: 1 INJECTION, SOLUTION INTRAMUSCULAR; INTRAVENOUS; SUBCUTANEOUS at 14:44

## 2022-07-15 RX ADMIN — SODIUM CHLORIDE: 900 INJECTION, SOLUTION INTRAVENOUS at 21:00

## 2022-07-15 RX ADMIN — DEXAMETHASONE SODIUM PHOSPHATE 4 MG: 10 INJECTION INTRAMUSCULAR; INTRAVENOUS at 11:26

## 2022-07-15 RX ADMIN — DEXAMETHASONE SODIUM PHOSPHATE 8 MG: 10 INJECTION INTRAMUSCULAR; INTRAVENOUS at 12:18

## 2022-07-15 RX ADMIN — FENTANYL CITRATE 50 MCG: 50 INJECTION INTRAMUSCULAR; INTRAVENOUS at 13:31

## 2022-07-15 RX ADMIN — ROPIVACAINE HYDROCHLORIDE 20 ML: 5 INJECTION, SOLUTION EPIDURAL; INFILTRATION; PERINEURAL at 11:26

## 2022-07-15 RX ADMIN — SODIUM CHLORIDE, SODIUM LACTATE, POTASSIUM CHLORIDE, AND CALCIUM CHLORIDE: 600; 310; 30; 20 INJECTION, SOLUTION INTRAVENOUS at 11:12

## 2022-07-15 RX ADMIN — SODIUM CHLORIDE, PRESERVATIVE FREE 10 ML: 5 INJECTION INTRAVENOUS at 21:00

## 2022-07-15 RX ADMIN — Medication 2 G: at 11:31

## 2022-07-15 RX ADMIN — HYDROMORPHONE HYDROCHLORIDE 0.5 MG: 1 INJECTION, SOLUTION INTRAMUSCULAR; INTRAVENOUS; SUBCUTANEOUS at 14:55

## 2022-07-15 ASSESSMENT — PAIN SCALES - GENERAL
PAINLEVEL_OUTOF10: 5
PAINLEVEL_OUTOF10: 7
PAINLEVEL_OUTOF10: 0
PAINLEVEL_OUTOF10: 3
PAINLEVEL_OUTOF10: 6
PAINLEVEL_OUTOF10: 4
PAINLEVEL_OUTOF10: 5
PAINLEVEL_OUTOF10: 6

## 2022-07-15 ASSESSMENT — PAIN DESCRIPTION - FREQUENCY
FREQUENCY: CONTINUOUS

## 2022-07-15 ASSESSMENT — PAIN DESCRIPTION - ONSET
ONSET: ON-GOING

## 2022-07-15 ASSESSMENT — PAIN DESCRIPTION - DESCRIPTORS
DESCRIPTORS: BURNING;ACHING
DESCRIPTORS: BURNING
DESCRIPTORS: DISCOMFORT;ACHING
DESCRIPTORS: BURNING
DESCRIPTORS: ACHING
DESCRIPTORS: BURNING
DESCRIPTORS: SORE
DESCRIPTORS: ACHING

## 2022-07-15 ASSESSMENT — PAIN DESCRIPTION - PAIN TYPE
TYPE: ACUTE PAIN;SURGICAL PAIN
TYPE: ACUTE PAIN

## 2022-07-15 ASSESSMENT — PAIN DESCRIPTION - LOCATION
LOCATION: HAND;ELBOW
LOCATION: ELBOW;HAND
LOCATION: ELBOW;HAND
LOCATION: SHOULDER
LOCATION: SHOULDER
LOCATION: ELBOW;HAND
LOCATION: HAND

## 2022-07-15 ASSESSMENT — PAIN DESCRIPTION - ORIENTATION
ORIENTATION: RIGHT

## 2022-07-15 ASSESSMENT — PAIN - FUNCTIONAL ASSESSMENT: PAIN_FUNCTIONAL_ASSESSMENT: 0-10

## 2022-07-15 NOTE — ANESTHESIA PROCEDURE NOTES
Peripheral Block    Patient location during procedure: pre-op  Reason for block: post-op pain management and at surgeon's request  Start time: 7/15/2022 11:26 AM  End time: 7/15/2022 11:29 AM  Staffing  Performed: anesthesiologist   Preanesthetic Checklist  Completed: patient identified, IV checked, site marked, risks and benefits discussed, surgical/procedural consents, equipment checked, pre-op evaluation, timeout performed, anesthesia consent given, oxygen available and monitors applied/VS acknowledged  Peripheral Block   Patient position: sitting  Prep: ChloraPrep  Provider prep: sterile gloves and mask  Patient monitoring: cardiac monitor, continuous pulse ox, frequent blood pressure checks, IV access, oxygen and responsive to questions  Block type: Brachial plexus  Interscalene  Laterality: right  Injection technique: single-shot  Guidance: ultrasound guided    Needle   Needle type: insulated echogenic nerve stimulator needle   Needle localization: ultrasound guidance  Assessment   Injection assessment: negative aspiration for heme, no paresthesia on injection, local visualized surrounding nerve on ultrasound and no intravascular symptoms  Paresthesia pain: none  Slow fractionated injection: yes  Hemodynamics: stable  Real-time US image taken/store: yes  Outcomes: uncomplicated and patient tolerated procedure well    Additional Notes  Ultrasound image taken and stored in chart   Medications Administered  ropivacaine (NAROPIN) injection 0.5% - Perineural   20 mL - 7/15/2022 11:26:00 AM

## 2022-07-15 NOTE — CONSULTS
[facility] Hospitalist Consult   Admit Date:  7/15/2022 10:02 AM   Name:  Robin Montesinos   Age:  80 y.o. Sex:  male  :  1950   MRN:  [id number]   Room:  [facility]/    Presenting Complaint: No chief complaint on file. Reason(s) for Admission: Closed 4-part fracture of surgical neck of right humerus, initial encounter Dub Stack  Other closed fracture of shaft of right humerus, initial encounter [S42.391A]  Degenerative joint disease of right acromioclavicular joint [M19.011]     Hospitalists consulted by Oskar Tejada MD for: post-op medical management    History of Presenting Illness:   Robin Montesinos is a 80 y.o. male with history of paroxysmal atrial fibrillation, history of DVT, heart failure with diastolic dysfunction who was admitted for close four-part fracture of surgical neck of humerus now status post reverse right total shoulder arthroplasty. His medication list is accurate and up-to-date. He understands that his metoprolol is being held. He has had episodes of constipation while on opioids in the past.  He was encouraged to use opioids appropriately to keep control of his pain. He is encouraged to notify staff if he needs help with bowel movements. His pain is currently controlled. He reports no further complaints. Review of Systems:  10 systems reviewed and negative except as noted in HPI. Assessment & Plan:   Closed 4-part fracture of surgical neck of right humerus  Osteoarthritis of right shoulder  Degenerative joint disease of right acromioclavicular joint  -Management per primary orthopedic team  -Ancef  -Dilaudid    Obstructive sleep apnea  -BiPAP at night    Paroxysmal atrial fibrillation  -Rivaroxaban    Primary hypertension  -Lisinopril-HCTZ    Diastolic dysfunction  -Hold metoprolol    BPH  -Tamsulosin      Diet:  ADULT DIET;  Regular  DVT PPx: On rivaroxaban  Code status: Full Code    Principal Problem:    Closed 4-part fracture of surgical neck of right humerus  Active Problems:    Closed 4-part fracture of surgical neck of right humerus, initial encounter    Osteoarthritis of right shoulder    FORTINO (obstructive sleep apnea)    Paroxysmal atrial fibrillation (HCC)    Primary hypertension    Diastolic dysfunction    Closed fracture of shaft of right humerus    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 *      Past History:  Past Medical History:   Diagnosis Date    Arthritis     hips, back  osteo    Atrial fibrillation (HCC)     x 1 episode- takes metoprolol    Cancer ([address]     skin  left arm removed    DISH (diffuse idiopathic skeletal hyperostosis)     Followed by Rheum    Hypertension dx'd about     controlled with meds    Osteoporosis     PE (pulmonary thromboembolism) ([address]     Sleep apnea     resolved after weight loss     SOB (shortness of breath) on exertion     Some SOB with just sitting    Thromboembolus ([address] 08, 12, AND 15+ yrs ago     ? left leg X 3- xarelto      Past Surgical History:   Procedure Laterality Date    JOINT REPLACEMENT Left     Hip    LIPOMA RESECTION  as a teenager    left side    MULTIPLE TOOTH EXTRACTIONS      Al teeth removed    OTHER SURGICAL HISTORY      wisdom teeth removed    OTHER SURGICAL HISTORY      lipoma removed left side    TOTAL HIP ARTHROPLASTY  2010    right    WISDOM TOOTH EXTRACTION  as a teenager      Social History     Tobacco Use    Smoking status: Former     Packs/day: 0.50     Years: 20.00     Pack years: 10.00     Types: Cigarettes     Quit date:      Years since quittin.5    Smokeless tobacco: Never    Tobacco comments:     Quit smoking: quit about 2009   Substance Use Topics    Alcohol use: No      Social History     Substance and Sexual Activity   Drug Use No     Family History   Problem Relation Age of Onset    Cancer Mother       Family history reviewed and negative except as noted above.     Immunization History   Administered Date(s) hrs:   Temp Pulse Resp BP SpO2   07/15/22 1820 -- -- 16 -- --   07/15/22 1555 97.5 °F (36.4 °C) 77 20 117/77 90 %   07/15/22 1530 -- 69 16 139/63 93 %   07/15/22 1525 -- 71 16 139/63 95 %   07/15/22 1520 -- 70 -- -- 91 %   07/15/22 1515 -- 82 16 (!) 154/65 95 %   07/15/22 1510 -- 69 16 (!) 159/65 94 %   07/15/22 1505 -- 71 16 (!) 187/72 95 %   07/15/22 1500 -- 70 16 (!) 152/66 95 %   07/15/22 1455 -- 72 16 (!) 153/65 96 %   07/15/22 1450 -- 85 16 (!) 143/59 96 %   07/15/22 1445 -- 69 16 138/67 97 %   07/15/22 1443 -- 75 -- -- --   07/15/22 1440 -- 76 16 137/65 96 %   07/15/22 1435 -- 79 16 (!) 150/69 96 %   07/15/22 1430 -- 65 16 133/61 96 %   07/15/22 1425 -- 68 16 (!) 128/58 98 %   07/15/22 1423 -- 75 -- -- 99 %   07/15/22 1422 -- 83 -- -- 90 %   07/15/22 1420 -- 73 16 (!) 156/63 96 %   07/15/22 1415 -- 78 16 (!) 155/70 97 %   07/15/22 1410 -- 77 16 (!) 142/71 94 %   07/15/22 1405 -- 74 16 (!) 141/76 --   07/15/22 1400 -- 76 16 (!) 123/59 --   07/15/22 1356 97.8 °F (36.6 °C) 77 16 (!) 132/59 92 %   07/15/22 1129 -- 81 16 124/60 93 %   07/15/22 1123 -- 66 16 131/66 93 %   07/15/22 1030 98.1 °F (36.7 °C) 77 16 (!) 147/89 98 %       Oxygen Therapy  SpO2: 90 %  Pulse via Oximetry: 63 beats per minute  Pulse Oximeter Device Mode: Continuous  O2 Device: Nasal cannula  O2 Flow Rate (L/min): 3 L/min    Estimated body mass index is 35.15 kg/m² as calculated from the following:    Height as of 7/13/22: 5' 10\" (1.778 m). Weight as of this encounter: 245 lb (111.1 kg). Intake/Output Summary (Last 24 hours) at 7/15/2022 1837  Last data filed at 7/15/2022 1828  Gross per 24 hour   Intake 1300 ml   Output 325 ml   Net 975 ml         Physical Exam:  Blood pressure 117/77, pulse 77, temperature 97.5 °F (36.4 °C), temperature source Oral, resp. rate 16, weight 245 lb (111.1 kg), SpO2 90 %. General:    Well nourished. Obese.   Head:  Normocephalic, atraumatic  Eyes:  Sclerae appear normal.   ENT:  Nares appear normal, no drainage. Moist oral mucosa  Lungs:   No respiratory distress. Abdomen:   Nondistended panniculus. Extremities: No cyanosis or clubbing. No edema. Right shoulder in surgical dressing with arm in fixed position across the abdomen, undisturbed. Skin:     No rashes and normal coloration. Warm and dry. No extension of erythema beyond surgical dressing. Neuro:  CN II-XII grossly intact. Sensation intact. A&Ox3  Psych:  Normal mood and affect.       I have reviewed ordered lab tests and independently visualized imaging below:    Recent Labs:  Recent Results (from the past 48 hour(s))   Basic Metabolic Panel    Collection Time: 07/15/22  2:04 PM   Result Value Ref Range    Sodium 136 136 - 145 mmol/L    Potassium 3.7 3.5 - 5.1 mmol/L    Chloride 104 98 - 107 mmol/L    CO2 27 21 - 32 mmol/L    Anion Gap 5 (L) 7 - 16 mmol/L    Glucose 144 (H) 65 - 100 mg/dL    BUN 20 8 - 23 MG/DL    CREATININE 0.93 0.8 - 1.5 MG/DL    GFR African American >60 >60 ml/min/1.73m2    GFR Non- >60 >60 ml/min/1.73m2    Calcium 8.5 8.3 - 10.4 MG/DL   CBC with Auto Differential    Collection Time: 07/15/22  2:04 PM   Result Value Ref Range    WBC 10.0 4.3 - 11.1 K/uL    RBC 2.83 (L) 4.23 - 5.6 M/uL    Hemoglobin 9.0 (L) 13.6 - 17.2 g/dL    Hematocrit 26.8 (L) 41.1 - 50.3 %    MCV 94.7 79.6 - 97.8 FL    MCH 31.8 26.1 - 32.9 PG    MCHC 33.6 31.4 - 35.0 g/dL    RDW 13.5 11.9 - 14.6 %    Platelets 020 153 - 072 K/uL    MPV 9.5 9.4 - 12.3 FL    nRBC 0.00 0.0 - 0.2 K/uL    Differential Type AUTOMATED      Seg Neutrophils 80 (H) 43 - 78 %    Lymphocytes 11 (L) 13 - 44 %    Monocytes 6 4.0 - 12.0 %    Eosinophils % 1 0.5 - 7.8 %    Basophils 1 0.0 - 2.0 %    Immature Granulocytes 1 0.0 - 5.0 %    Segs Absolute 8.0 1.7 - 8.2 K/UL    Absolute Lymph # 1.1 0.5 - 4.6 K/UL    Absolute Mono # 0.6 0.1 - 1.3 K/UL    Absolute Eos # 0.1 0.0 - 0.8 K/UL    Basophils Absolute 0.1 0.0 - 0.2 K/UL    Absolute Immature Granulocyte 0.1 0.0 - 0.5 K/UL Magnesium    Collection Time: 07/15/22  2:04 PM   Result Value Ref Range    Magnesium 2.2 1.8 - 2.4 mg/dL       Other Studies:  XR SHOULDER RIGHT (MIN 2 VIEWS)    Result Date: 7/15/2022  EXAM: 2 views of the right shoulder. INDICATION: Postoperative. COMPARISON: Shoulder radiographs dated July 12, 2022. FINDINGS: Immediate postsurgical change of reverse right total shoulder arthroplasty without evidence of acute postoperative complication. There is a small bone fragment which remains present along the lateral aspect of the humeral prosthesis related to recent fracture. There is air and soft tissue swelling in the surgical bed. 1. Immediate postsurgical change of reverse right total shoulder arthroplasty without evidence of acute postoperative complication.          Signed:  Kota Gray MD

## 2022-07-15 NOTE — DISCHARGE SUMMARY
[facility] Discharge Summary      Patient ID:  Florida Rivera  [id number]  80 y.o.  1950    Allergies: Meperidine and Morphine    Admit date: 7/15/2022    Discharge date and time: 7/18/2022    Admitting Physician: Aye Lee MD     Discharge Physician: Eagle Leslie MD      * Admission Diagnoses: Closed 4-part fracture of surgical neck of right humerus, initial encounter Hannah Hernández  Other closed fracture of shaft of right humerus, initial encounter [S42.391A]  Degenerative joint disease of right acromioclavicular joint [M19.011]    * Discharge Diagnoses: Principal Problem:    Closed 4-part fracture of surgical neck of right humerus  Active Problems:    Closed 4-part fracture of surgical neck of right humerus, initial encounter    Osteoarthritis of right shoulder    FORTINO (obstructive sleep apnea)    Paroxysmal atrial fibrillation (HCC)    Postoperative anemia due to acute blood loss    Primary hypertension    Diastolic dysfunction    Closed fracture of shaft of right humerus    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 *      Surgeon: Eagle Leslie MD          * Procedure: Procedure(s) with comments:  right reverse total shoulder arthroplasty with a delta xtend prosthesis and biceps tenodesis in combination with a latissimus rené and teres major tendon transfer, general interscalene block, 23hr observation - interscalene block           Perioperative Antibiotics: Ancef  __x_                                                Vancomycin  ___          Post Op complications: none      * Discharge Condition: Good  Wound appears to be healing without any evidence of infection.          * Discharged to: Home    * Follow-up Care/Discharge instructions:  - Resume pre hospital diet            - Resume home medications per medical continuation form     CONTINUE PHYSICAL THERAPY  Sling right shoulder  - Follow up in office as scheduled       Signed:  Faina Montano MD  7/18/2022  6:32 AM

## 2022-07-15 NOTE — BRIEF OP NOTE
BRIEF OPERATIVE NOTE    Date of Procedure: 7/15/2022     Preoperative Diagnosis:  CLOSED 4 PART RIGHT PROXIMAL HUMERUS FRACTURE      CLOSED RIGHT HUMERAL SHAFT FRACTURE     Postoperative Diagnosis:  SAME      GLENOHUMERAL OSTEOARTHRITIS RIGHT SHOULDER    Procedure(s)  REVERSE RIGHT TOTAL SHOULDER ARTHROPLASTY WITH DELTA EXTEND PROSTHESIS, BICEPS TENODESIS    Surgeon(s) and Role:     * Romell Galeazzi., MD - Primary         Assistant Staff:  NONE    Surgical Staff:  Circulator: John Hernandez RN; Jolene Diallo RN  Surgical Assistant: Mt Pat Circulator: Pamela Akbar RN  Scrub Person First: Rishabh Araujo  Scrub Person Second: Hart Leventhal      * Missing procedure start or end time(s) *    Anesthesia:  GENERAL WITH INTERSCALENE BLOCK    Estimated Blood Loss: 467 CC      Complications: NONE    Implants:   Implant Name Type Inv. Item Serial No.  Lot No. LRB No. Used Action   DELTA XTEND REVERSE SHOULDER SYSTEM AUGMENTED CENTRAL SCREW METAGLEME 4MM LATERALIZED CEMENTLESS Screw/Plate/Nail/Justin   [employer]_CR [id number] Right 1 Implanted   DELTA XTEND REVERSE SHOULDER SYSTEM CENTRAL METAGLENE SCREW Screw/Plate/Nail/Justin   [employer]CR [id number] Right 1 Implanted   DELTA XTEND REVERSE SHOULDER SYSTEM CENTRAL SCREW METAGLENE COLLECT Screw/Plate/Nail/Justin   [employer]CR [id number] Right 1 Implanted   COMPONENT GLENOSPHERE ECCENTRIC XTEND 38MM PLUS 8MM - [id number]  COMPONENT GLENOSPHERE ECCENTRIC XTEND 38MM PLUS 8MM  [employer]- [id number] Right 1 Implanted   SCREW BNE L20MM DIA4. 5MM PROX DEIDRA TIB S STL ST [place] FULL - [id number]  SCREW BNE L20MM DIA4. 5MM PROX DEIDRA TIB S STL ST CHEVY FULL  [employer] [id number] Right 2 Implanted   SCREW BNE L42MM DIA4. 5MM PROX DEIDRA TIB S STL ST [place] FULL - [id number]  SCREW BNE L42MM DIA4. 5MM PROX DEIDRA TIB S STL ST [place] FULL  [employer] [facility] [id number] Right 1 Implanted   SCREW BNE L50MM DIA4. 5MM PROX DEIDRA TIB S STL ST [place] FULL - [id number]  SCREW BNE L50MM DIA4. 5MM PROX DEIDRA TIB S STL ST [place] FULL  [employer] [id number] Right 1 Implanted   SPACER HUM +9MM OFFSET SHLDR POLYETH FOR DELT XTEND REV SYS - [id number]  SPACER HUM +9MM OFFSET SHLDR POLYETH FOR DELT XTEND REV SYS  [facility] [employer]- [id number] Right 1 Implanted   SPACER HUM +9MM OFFSET SHLDR POLYETH FOR DELT XTEND REV SYS - [id number]  SPACER HUM +9MM OFFSET SHLDR POLYETH FOR DELT XTEND REV SYS  [facility] [employer]- [id number] Right 1 Implanted   STEM HUM SZ 2 [id number] 155DEG SHLDR CO CHROM ALLY STD HOMERO - [id number]  STEM HUM SZ 2 [id number] 155DEG SHLDR CO CHROM ALLY STD HOMERO  [facility] [employer]- [id number] Right 1 Implanted   RESTRICTOR ELIZABETH [id number] UNIV FEM CNL UHMWPE BIOSTP G - [id number]  RESTRICTOR ELIZABETH [id number] UNIV FEM CNL UHMWPE BIOSTP G  [facility] [id number] Right 1 Implanted   CEMENT BNE 20ML 41GM FULL DOSE PMMA W/ Kathern Colby VISC RADPQ - [id number]  CEMENT BNE 20ML 41GM FULL DOSE PMMA W/ TOBRA M VISC RADPQ  [employer] [facility] [id number] Right 2 Implanted   CUP HUM [id number] +9MM OFFSET STD SHLDR Ely Budds - [id number]  CUP HUM [id number] +9MM OFFSET STD SHLDR POLYETH DELT XTEND  [facility] [employer]- [id number] Right 1 Implanted       Papi Boudreaux MD

## 2022-07-15 NOTE — ANESTHESIA POSTPROCEDURE EVALUATION
Department of Anesthesiology  Postprocedure Note    Patient: Zeeshan Coello  MRN: [id number]  YOB: 1950  Date of evaluation: 7/15/2022      Procedure Summary     Date: 07/15/22 Room / Location: [facility] MAIN OR  / [facility] MAIN OR    Anesthesia Start: 1133 Anesthesia Stop: 1400    Procedure: REVERSE RIGHT TOTAL SHOULDER ARTHROPLASTY WITH DELTA EXTEND PROSTHESIS, BICEPS TENODESIS (Right: Shoulder) Diagnosis:       Closed 4-part fracture of surgical neck of right humerus, initial encounter      Other closed fracture of shaft of right humerus, initial encounter      Degenerative joint disease of right acromioclavicular joint      (Closed 4-part fracture of surgical neck of right humerus, initial encounter Ariel Mar)      (Other closed fracture of shaft of right humerus, initial encounter [S42.391A])      (Degenerative joint disease of right acromioclavicular joint [M19.011])    Surgeons: Wolf Huber MD Responsible Provider: Donnell Nolan MD    Anesthesia Type: general ASA Status: 3          Anesthesia Type: No value filed. Reba Phase I: Reba Score: 9    Reba Phase II:        Anesthesia Post Evaluation    Patient location during evaluation: PACU  Patient participation: complete - patient participated  Level of consciousness: awake and alert  Airway patency: patent  Nausea: well controlled. Complications: no  Cardiovascular status: acceptable.   Respiratory status: acceptable  Hydration status: stable

## 2022-07-15 NOTE — H&P
Update History & Physical    The patient's History and Physical of July 13, 2022 was reviewed with the patient and I examined the patient. There was no change. The surgical site was confirmed by the patient and me. Plan: The risks, benefits, expected outcome, and alternative to the recommended procedure have been discussed with the patient. Patient understands and wants to proceed with the procedure.      Electronically signed by Patricia Recinos MD on 7/15/2022 at 10:05 AM

## 2022-07-16 LAB
ANION GAP SERPL CALC-SCNC: 7 MMOL/L (ref 7–16)
BUN SERPL-MCNC: 17 MG/DL (ref 8–23)
CALCIUM SERPL-MCNC: 8.8 MG/DL (ref 8.3–10.4)
CHLORIDE SERPL-SCNC: 102 MMOL/L (ref 98–107)
CO2 SERPL-SCNC: 26 MMOL/L (ref 21–32)
CREAT SERPL-MCNC: 0.83 MG/DL (ref 0.8–1.5)
ERYTHROCYTE [DISTWIDTH] IN BLOOD BY AUTOMATED COUNT: 13.2 % (ref 11.9–14.6)
GLUCOSE SERPL-MCNC: 136 MG/DL (ref 65–100)
HCT VFR BLD AUTO: 25.7 % (ref 41.1–50.3)
HGB BLD-MCNC: 8.5 G/DL (ref 13.6–17.2)
MAGNESIUM SERPL-MCNC: 2.1 MG/DL (ref 1.8–2.4)
MCH RBC QN AUTO: 30.9 PG (ref 26.1–32.9)
MCHC RBC AUTO-ENTMCNC: 33.1 G/DL (ref 31.4–35)
MCV RBC AUTO: 93.5 FL (ref 79.6–97.8)
NRBC # BLD: 0 K/UL (ref 0–0.2)
PLATELET # BLD AUTO: 303 K/UL (ref 150–450)
PMV BLD AUTO: 9.3 FL (ref 9.4–12.3)
POTASSIUM SERPL-SCNC: 3.5 MMOL/L (ref 3.5–5.1)
RBC # BLD AUTO: 2.75 M/UL (ref 4.23–5.6)
SODIUM SERPL-SCNC: 135 MMOL/L (ref 136–145)
WBC # BLD AUTO: 11.1 K/UL (ref 4.3–11.1)

## 2022-07-16 PROCEDURE — 6370000000 HC RX 637 (ALT 250 FOR IP): Performed by: FAMILY MEDICINE

## 2022-07-16 PROCEDURE — 36415 COLL VENOUS BLD VENIPUNCTURE: CPT

## 2022-07-16 PROCEDURE — 6360000002 HC RX W HCPCS: Performed by: FAMILY MEDICINE

## 2022-07-16 PROCEDURE — 97530 THERAPEUTIC ACTIVITIES: CPT

## 2022-07-16 PROCEDURE — 97162 PT EVAL MOD COMPLEX 30 MIN: CPT

## 2022-07-16 PROCEDURE — 1100000000 HC RM PRIVATE

## 2022-07-16 PROCEDURE — 85027 COMPLETE CBC AUTOMATED: CPT

## 2022-07-16 PROCEDURE — 6370000000 HC RX 637 (ALT 250 FOR IP): Performed by: ORTHOPAEDIC SURGERY

## 2022-07-16 PROCEDURE — G0378 HOSPITAL OBSERVATION PER HR: HCPCS

## 2022-07-16 PROCEDURE — 6360000002 HC RX W HCPCS: Performed by: ORTHOPAEDIC SURGERY

## 2022-07-16 PROCEDURE — 96376 TX/PRO/DX INJ SAME DRUG ADON: CPT

## 2022-07-16 PROCEDURE — 2580000003 HC RX 258: Performed by: FAMILY MEDICINE

## 2022-07-16 PROCEDURE — 2580000003 HC RX 258: Performed by: ORTHOPAEDIC SURGERY

## 2022-07-16 PROCEDURE — C9113 INJ PANTOPRAZOLE SODIUM, VIA: HCPCS | Performed by: FAMILY MEDICINE

## 2022-07-16 PROCEDURE — 83735 ASSAY OF MAGNESIUM: CPT

## 2022-07-16 PROCEDURE — 96374 THER/PROPH/DIAG INJ IV PUSH: CPT

## 2022-07-16 PROCEDURE — 94760 N-INVAS EAR/PLS OXIMETRY 1: CPT

## 2022-07-16 PROCEDURE — 80048 BASIC METABOLIC PNL TOTAL CA: CPT

## 2022-07-16 RX ORDER — MAGNESIUM HYDROXIDE/ALUMINUM HYDROXICE/SIMETHICONE 120; 1200; 1200 MG/30ML; MG/30ML; MG/30ML
30 SUSPENSION ORAL EVERY 6 HOURS PRN
Status: DISCONTINUED | OUTPATIENT
Start: 2022-07-16 | End: 2022-07-18 | Stop reason: HOSPADM

## 2022-07-16 RX ORDER — HYDROMORPHONE HYDROCHLORIDE 1 MG/ML
0.5 INJECTION, SOLUTION INTRAMUSCULAR; INTRAVENOUS; SUBCUTANEOUS ONCE
Status: COMPLETED | OUTPATIENT
Start: 2022-07-16 | End: 2022-07-16

## 2022-07-16 RX ORDER — LANOLIN ALCOHOL/MO/W.PET/CERES
4.5 CREAM (GRAM) TOPICAL ONCE
Status: COMPLETED | OUTPATIENT
Start: 2022-07-16 | End: 2022-07-16

## 2022-07-16 RX ADMIN — LISINOPRIL AND HYDROCHLOROTHIAZIDE 1 TABLET: 25; 20 TABLET ORAL at 09:58

## 2022-07-16 RX ADMIN — HYDROMORPHONE HYDROCHLORIDE 0.5 MG: 1 INJECTION, SOLUTION INTRAMUSCULAR; INTRAVENOUS; SUBCUTANEOUS at 09:59

## 2022-07-16 RX ADMIN — DOCUSATE SODIUM 100 MG: 100 CAPSULE ORAL at 20:46

## 2022-07-16 RX ADMIN — FERROUS SULFATE TAB 325 MG (65 MG ELEMENTAL FE) 325 MG: 325 (65 FE) TAB at 07:45

## 2022-07-16 RX ADMIN — CEFAZOLIN 1000 MG: 1 INJECTION, POWDER, FOR SOLUTION INTRAMUSCULAR; INTRAVENOUS at 03:37

## 2022-07-16 RX ADMIN — DOXYCYCLINE 30 ML: 100 INJECTION, POWDER, LYOPHILIZED, FOR SOLUTION INTRAVENOUS at 18:23

## 2022-07-16 RX ADMIN — HYDROMORPHONE HYDROCHLORIDE 4 MG: 4 TABLET ORAL at 13:33

## 2022-07-16 RX ADMIN — HYDROMORPHONE HYDROCHLORIDE 1 MG: 1 INJECTION, SOLUTION INTRAMUSCULAR; INTRAVENOUS; SUBCUTANEOUS at 15:19

## 2022-07-16 RX ADMIN — Medication 4.5 MG: at 20:48

## 2022-07-16 RX ADMIN — DOCUSATE SODIUM 100 MG: 100 CAPSULE ORAL at 09:58

## 2022-07-16 RX ADMIN — CEFAZOLIN 1000 MG: 1 INJECTION, POWDER, FOR SOLUTION INTRAMUSCULAR; INTRAVENOUS at 13:33

## 2022-07-16 RX ADMIN — HYDROMORPHONE HYDROCHLORIDE 1 MG: 1 INJECTION, SOLUTION INTRAMUSCULAR; INTRAVENOUS; SUBCUTANEOUS at 18:33

## 2022-07-16 RX ADMIN — DOXYCYCLINE 30 ML: 100 INJECTION, POWDER, LYOPHILIZED, FOR SOLUTION INTRAVENOUS at 09:58

## 2022-07-16 RX ADMIN — FENOFIBRATE 160 MG: 160 TABLET ORAL at 09:58

## 2022-07-16 RX ADMIN — ONDANSETRON 4 MG: 4 TABLET, ORALLY DISINTEGRATING ORAL at 03:32

## 2022-07-16 RX ADMIN — FERROUS SULFATE TAB 325 MG (65 MG ELEMENTAL FE) 325 MG: 325 (65 FE) TAB at 18:23

## 2022-07-16 RX ADMIN — SODIUM CHLORIDE, PRESERVATIVE FREE 40 MG: 5 INJECTION INTRAVENOUS at 09:58

## 2022-07-16 RX ADMIN — RIVAROXABAN 20 MG: 20 TABLET, FILM COATED ORAL at 18:23

## 2022-07-16 RX ADMIN — HYDROMORPHONE HYDROCHLORIDE 4 MG: 4 TABLET ORAL at 04:10

## 2022-07-16 RX ADMIN — HYDROMORPHONE HYDROCHLORIDE 4 MG: 4 TABLET ORAL at 20:47

## 2022-07-16 RX ADMIN — HYDROMORPHONE HYDROCHLORIDE 4 MG: 4 TABLET ORAL at 07:45

## 2022-07-16 RX ADMIN — TAMSULOSIN HYDROCHLORIDE 0.4 MG: 0.4 CAPSULE ORAL at 09:58

## 2022-07-16 ASSESSMENT — PAIN DESCRIPTION - LOCATION
LOCATION: SHOULDER

## 2022-07-16 ASSESSMENT — PAIN DESCRIPTION - ORIENTATION
ORIENTATION: RIGHT

## 2022-07-16 ASSESSMENT — PAIN SCALES - GENERAL
PAINLEVEL_OUTOF10: 6
PAINLEVEL_OUTOF10: 6
PAINLEVEL_OUTOF10: 8
PAINLEVEL_OUTOF10: 4
PAINLEVEL_OUTOF10: 5
PAINLEVEL_OUTOF10: 4
PAINLEVEL_OUTOF10: 4
PAINLEVEL_OUTOF10: 7
PAINLEVEL_OUTOF10: 8
PAINLEVEL_OUTOF10: 6
PAINLEVEL_OUTOF10: 4
PAINLEVEL_OUTOF10: 4
PAINLEVEL_OUTOF10: 5
PAINLEVEL_OUTOF10: 6

## 2022-07-16 ASSESSMENT — PAIN DESCRIPTION - PAIN TYPE: TYPE: ACUTE PAIN

## 2022-07-16 ASSESSMENT — PAIN DESCRIPTION - DESCRIPTORS
DESCRIPTORS: ACHING
DESCRIPTORS: THROBBING;DISCOMFORT
DESCRIPTORS: ACHING
DESCRIPTORS: ACHING;DISCOMFORT;THROBBING
DESCRIPTORS: ACHING
DESCRIPTORS: ACHING
DESCRIPTORS: THROBBING

## 2022-07-16 NOTE — OP NOTE
[place]  OPERATIVE REPORT    Name:  He Blanton  MR#:  [id number]  :  1950  ACCOUNT #:  [de-identified]  DATE OF SERVICE:  07/15/2022    PREOPERATIVE DIAGNOSES:  1. Closed four-part right proximal humerus fracture. 2.  Right humeral shaft fracture. POSTOPERATIVE DIAGNOSES:  1. Closed four-part right proximal humerus fracture. 2.  Right humeral shaft fracture. 3.  Glenohumeral osteoarthritis, right shoulder. PROCEDURE PERFORMED:  Reverse right total shoulder arthroplasty with a Delta Xtend prosthesis, biceps tenodesis. SURGEON:  Juan Ramon Guzmán. Jolene Franco MD        ANESTHESIA:  General with an interscalene block. COMPLICATIONS:  None. IMPLANTS:  Hardware utilized is a DePuy +4 mm lateralized central screw metaglene, 38 eccentric +8 mm lateralized glenosphere, 38+9 cup, two +9 extenders, 12.2 stem, 12 mm Biostop G, 50 mm superior, 42 mm inferior, 20 mm anterior and posterior nonlocking cortical screws as well as a 35 mm central screw. ESTIMATED BLOOD LOSS:  100 mL. FINDINGS:  1.  Four-part right proximal humerus fracture. 2.  Humeral shaft fracture. 3.  Glenohumeral osteoarthritis. CPT CODE:  74729. ICD-10 CODES:  S42.241, S42.391, M19.011. INDICATIONS:  The patient is a 67-year-old right-hand-dominant gentleman with multiple medical issues including atrial fibrillation, history of PE, on Xarelto, COPD, and morbid obesity. The patient got up in the middle of the night to get a snack at 2 a.m. fell injuring his right shoulder. Preoperative physical exam and radiographs demonstrate a complex right proximal humerus fracture. There is a four-part right proximal humerus fracture with an extension down the humeral shaft. The patient has exhausted nonoperative modalities and electively admitted for operative intervention. PROCEDURE:  Following identification of the patient, the patient was taken to the operative suite.   Following administration of general anesthesia, interscalene block for postop pain control, 2 g of IV Ancef, and placement of Chang catheter, the patient was very carefully positioned on the operative table in the beach-chair fashion. The right shoulder was then prepped and draped in the sterile fashion. A standard deltopectoral incision was identified and marked. InteguSeal was applied. Once the InteguSeal was allowed to cure, the skin was incised. Subcutaneous tissue was then dissected down to the cephalic vein. This was dissected proximally and distally. There was a large hematoma and this was evacuated. There was a complex right proximal humerus fracture. There was a four-part proximally. There was a large segment of the humeral shaft medially. Biceps tendon was identified. It was dissected up through the rotator interval.  It was tagged, transected for later tenodesis. The greater tuberosity fracture fragment was then tagged, mobilized, debulked for later repair. Likewise, the lesser tuberosity was tagged, debulked for later repair. The humeral head was removed from the wound. There were marked degenerative changes in the humeral head and the glenoid. The proximal humerus shaft was then exposed. It was debrided with an oscillating saw and rongeur. It was noted that a 12 stem gave the excellent fit and fill. The axillary nerve and the radial nerve were protected. At this point, attention was then turned to glenoid. The glenoid was then meticulously exposed. Starter wire was then drilled. The glenoid was reamed. A +4 central screw metaglene was inserted. It was then secured with a 35 mm central screw, 50 mm superior, 42 mm inferior, 20 mm anterior and posterior nonlocking cortical screws. The +4 metaglene was stable. The Lockie Kirk was then placed down the center of the metaglene and a 38 eccentric +8 lateralized glenosphere was inserted in the standard fashion. Metaglene and glenosphere were stable.   The humerus was then dislocated back from the wound. It was noted that a 12.2 stem with two +9 extenders and a 38+9 cup gave excellent stability and mobility. At this point, the humeral canal was irrigated and dried. The antibiotic cement restrictor was placed distally. Antibiotic-impregnated cement was mixed and inserted in the humeral canal and a 12.2 stem was cemented in the appropriate version. Excessive cement was removed with a curette. Once the cement was allowed to cure, two +9 extenders along with 38+9 cup was inserted. Shoulder was reduced. There was excellent stability with excellent mobility. At this point, the greater tuberosity was repaired back to the fins of the prosthesis using #5 Mersilene sutures in a modified Leon-Duke type fashion. The lesser tuberosity was repaired back to the fins of the prosthesis using #5 Mersilene sutures in a modified Leon-Duke type fashion. Horizontal and vertical sutures were utilized to augment the tuberosity repair. The biceps was tenodesed using #5 Mersilene sutures. Arm was put through range of motion and stable. Axillary and radial nerves were intact. The wound was then irrigated. Deltopectoral interval was approximated with #2 Mersilene suture. Skin was closed with 0 Vicryl figure-of-eight sutures and a 2-0 Prolene subcuticular stitch. A sterile dressing was applied. A sling and swathe was applied. The patient was then transferred to the recovery room in stable condition. MD JAS [email or web address]  D:  07/15/2022 13:51  T:  07/15/2022 22:43  JOB #:  [id number]  CC:   Jimmy Medina MD

## 2022-07-16 NOTE — PROGRESS NOTES
Patient resting quietly, alert and oriented, no distress noted. Right shoulder dressing c/d/i, hoover draining clear yellow urine. Neurovascular and peripheral vascular checks WNL. Bed low and locked position. Call light within reach. Patient instructed to call for assistance, verbalizes understanding. Nursing assessment complete.

## 2022-07-16 NOTE — PLAN OF CARE
Problem: Pain  Goal: Verbalizes/displays adequate comfort level or baseline comfort level  Outcome: Progressing     Problem: Safety - Adult  Goal: Free from fall injury  Outcome: Progressing     Problem: Discharge Planning  Goal: Discharge to home or other facility with appropriate resources  Outcome: Progressing

## 2022-07-16 NOTE — PROGRESS NOTES
Hospitalist Progress Note   Admit Date:  7/15/2022 10:02 AM   Name:  Charlene Ceballos   Age:  80 y.o. Sex:  male  :  1950   MRN:  [id number]   Room:  [facility]/    Presenting Complaint: No chief complaint on file. Reason(s) for Admission: Closed 4-part fracture of surgical neck of right humerus, initial encounter Farida Winn  Other closed fracture of shaft of right humerus, initial encounter [S42.391A]  Degenerative joint disease of right acromioclavicular joint [M19.011]     Hospital Course & Interval History: We are consulted for post operative medical management of 71M PMHx paroxysmal atrial fibrillation, history of DVT, heart failure with diastolic dysfunction who was admitted for close four-part fracture of surgical neck of humerus now status post reverse right total shoulder arthroplasty. His medication list was accurate and up-to-date. He understood that his metoprolol was being held. He had episodes of constipation while on opioids in the past.  He was encouraged to use opioids appropriately to keep control of his pain. He was encouraged to notify staff if he needed help with bowel movements. His pain was controlled initially. He reported no further complaints. Subjective/24hr Events (22):   Pain not controlled. \"Fireball\" going up from stomach upon waking. Poor quality sleep. Working with PT, OT. No BM.       Assessment & Plan:   Closed 4-part fracture of surgical neck of right humerus  Osteoarthritis of right shoulder  Degenerative joint disease of right acromioclavicular joint  -Management per primary orthopedic team  -Ancef  -Dilaudid po  2022: required single dose dilaudid IV after PT     Gastroesophageal reflux (new onset)  -pantoprazole IV bid x 4 doses    Obstructive sleep apnea  -BiPAP at night     Paroxysmal atrial fibrillation  -Rivaroxaban     Primary hypertension  -Lisinopril-HCTZ     Diastolic dysfunction  -Hold metoprolol     BPH  -Tamsulosin      Diet:  ADULT DIET; Regular  DVT PPx: on rivaroxaban  Code status: Full Code    Hospital Problems:  Principal Problem:    Closed 4-part fracture of surgical neck of right humerus  Active Problems:    Closed 4-part fracture of surgical neck of right humerus, initial encounter    Osteoarthritis of right shoulder    FORTINO (obstructive sleep apnea)    Paroxysmal atrial fibrillation (HCC)    Primary hypertension    Diastolic dysfunction    Closed fracture of shaft of right humerus    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  *      Objective:   Patient Vitals for the past 24 hrs:   Temp Pulse Resp BP SpO2   07/16/22 1144 99.1 °F (37.3 °C) 84 18 135/66 98 %   07/16/22 0958 -- -- 16 -- --   07/16/22 0808 -- 85 18 -- 94 %   07/16/22 0745 -- -- 16 -- --   07/16/22 0722 98.1 °F (36.7 °C) 88 (!) 40 104/70 (!) 89 %   07/16/22 0356 98.4 °F (36.9 °C) 77 20 129/78 92 %   07/16/22 0048 97.8 °F (36.6 °C) 92 22 138/63 91 %   07/15/22 1936 98.1 °F (36.7 °C) 88 20 124/69 91 %   07/15/22 1820 -- -- 16 -- --   07/15/22 1555 97.5 °F (36.4 °C) 77 20 117/77 90 %   07/15/22 1530 -- 69 16 139/63 93 %   07/15/22 1525 -- 71 16 139/63 95 %   07/15/22 1520 -- 70 -- -- 91 %   07/15/22 1515 -- 82 16 (!) 154/65 95 %   07/15/22 1510 -- 69 16 (!) 159/65 94 %   07/15/22 1505 -- 71 16 (!) 187/72 95 %   07/15/22 1500 -- 70 16 (!) 152/66 95 %   07/15/22 1455 -- 72 16 (!) 153/65 96 %   07/15/22 1450 -- 85 16 (!) 143/59 96 %   07/15/22 1445 -- 69 16 138/67 97 %   07/15/22 1443 -- 75 -- -- --   07/15/22 1440 -- 76 16 137/65 96 %   07/15/22 1435 -- 79 16 (!) 150/69 96 %   07/15/22 1430 -- 65 16 133/61 96 %   07/15/22 1425 -- 68 16 (!) 128/58 98 %   07/15/22 1423 -- 75 -- -- 99 %   07/15/22 1422 -- 83 -- -- 90 %   07/15/22 1420 -- 73 16 (!) 156/63 96 %   07/15/22 1415 -- 78 16 (!) 155/70 97 %   07/15/22 1410 -- 77 16 (!) 142/71 94 %   07/15/22 1405 -- 74 16 (!) 141/76 --   07/15/22 1400 -- 76 16 (!) 123/59 --   07/15/22 1356 97.8 °F (36.6 °C) 77 16 (!) 132/59 92 %       Oxygen Therapy  SpO2: 98 %  Pulse via Oximetry: 63 beats per minute  Pulse Oximeter Device Mode: Intermittent  O2 Device: None (Room air)  O2 Flow Rate (L/min): 0 L/min    Estimated body mass index is 35.15 kg/m² as calculated from the following:    Height as of 7/13/22: 5' 10\" (1.778 m). Weight as of this encounter: 245 lb (111.1 kg). Intake/Output Summary (Last 24 hours) at 7/16/2022 1243  Last data filed at 7/16/2022 0410  Gross per 24 hour   Intake 300 ml   Output 825 ml   Net -525 ml       Blood pressure 135/66, pulse 84, temperature 99.1 °F (37.3 °C), resp. rate 18, weight 245 lb (111.1 kg), SpO2 98 %. Physical Exam  Vitals and nursing note reviewed. Constitutional:       General: He is not in acute distress. Appearance: He is obese. He is ill-appearing. He is not diaphoretic. HENT:      Head: Normocephalic and atraumatic. Eyes:      Extraocular Movements: Extraocular movements intact. Cardiovascular:      Rate and Rhythm: Normal rate. Pulmonary:      Effort: Pulmonary effort is normal. No respiratory distress. Abdominal:      General: There is no distension. Musculoskeletal:         General: No deformity. Comments: R shoulder in surgical bandage   Skin:     Coloration: Skin is not jaundiced or pale. Comments: Bruising without erythema extending beyond surgical bandage of R shoulder   Neurological:      General: No focal deficit present. Mental Status: He is alert and oriented to person, place, and time. Psychiatric:         Mood and Affect: Mood is depressed. Behavior: Behavior normal. Behavior is cooperative.          Cognition and Memory: Cognition normal.         I have reviewed ordered lab tests and independently visualized imaging below:    Recent Labs:  Recent Results (from the past 48 hour(s))   Basic Metabolic Panel    Collection Time: 07/15/22  2:04 PM   Result Value Ref Range Sodium 136 136 - 145 mmol/L    Potassium 3.7 3.5 - 5.1 mmol/L    Chloride 104 98 - 107 mmol/L    CO2 27 21 - 32 mmol/L    Anion Gap 5 (L) 7 - 16 mmol/L    Glucose 144 (H) 65 - 100 mg/dL    BUN 20 8 - 23 MG/DL    CREATININE 0.93 0.8 - 1.5 MG/DL    GFR African American >60 >60 ml/min/1.73m2    GFR Non- >60 >60 ml/min/1.73m2    Calcium 8.5 8.3 - 10.4 MG/DL   CBC with Auto Differential    Collection Time: 07/15/22  2:04 PM   Result Value Ref Range    WBC 10.0 4.3 - 11.1 K/uL    RBC 2.83 (L) 4.23 - 5.6 M/uL    Hemoglobin 9.0 (L) 13.6 - 17.2 g/dL    Hematocrit 26.8 (L) 41.1 - 50.3 %    MCV 94.7 79.6 - 97.8 FL    MCH 31.8 26.1 - 32.9 PG    MCHC 33.6 31.4 - 35.0 g/dL    RDW 13.5 11.9 - 14.6 %    Platelets 984 948 - 906 K/uL    MPV 9.5 9.4 - 12.3 FL    nRBC 0.00 0.0 - 0.2 K/uL    Differential Type AUTOMATED      Seg Neutrophils 80 (H) 43 - 78 %    Lymphocytes 11 (L) 13 - 44 %    Monocytes 6 4.0 - 12.0 %    Eosinophils % 1 0.5 - 7.8 %    Basophils 1 0.0 - 2.0 %    Immature Granulocytes 1 0.0 - 5.0 %    Segs Absolute 8.0 1.7 - 8.2 K/UL    Absolute Lymph # 1.1 0.5 - 4.6 K/UL    Absolute Mono # 0.6 0.1 - 1.3 K/UL    Absolute Eos # 0.1 0.0 - 0.8 K/UL    Basophils Absolute 0.1 0.0 - 0.2 K/UL    Absolute Immature Granulocyte 0.1 0.0 - 0.5 K/UL   Magnesium    Collection Time: 07/15/22  2:04 PM   Result Value Ref Range    Magnesium 2.2 1.8 - 2.4 mg/dL   Basic Metabolic Panel    Collection Time: 07/16/22  3:48 AM   Result Value Ref Range    Sodium 135 (L) 136 - 145 mmol/L    Potassium 3.5 3.5 - 5.1 mmol/L    Chloride 102 98 - 107 mmol/L    CO2 26 21 - 32 mmol/L    Anion Gap 7 7 - 16 mmol/L    Glucose 136 (H) 65 - 100 mg/dL    BUN 17 8 - 23 MG/DL    CREATININE 0.83 0.8 - 1.5 MG/DL    GFR African American >60 >60 ml/min/1.73m2    GFR Non- >60 >60 ml/min/1.73m2    Calcium 8.8 8.3 - 10.4 MG/DL   Magnesium    Collection Time: 07/16/22  3:48 AM   Result Value Ref Range    Magnesium 2.1 1.8 - 2.4 mg/dL   CBC Collection Time: 07/16/22  3:48 AM   Result Value Ref Range    WBC 11.1 4.3 - 11.1 K/uL    RBC 2.75 (L) 4.23 - 5.6 M/uL    Hemoglobin 8.5 (L) 13.6 - 17.2 g/dL    Hematocrit 25.7 (L) 41.1 - 50.3 %    MCV 93.5 79.6 - 97.8 FL    MCH 30.9 26.1 - 32.9 PG    MCHC 33.1 31.4 - 35.0 g/dL    RDW 13.2 11.9 - 14.6 %    Platelets 940 707 - 929 K/uL    MPV 9.3 (L) 9.4 - 12.3 FL    nRBC 0.00 0.0 - 0.2 K/uL       Other Studies:  XR SHOULDER RIGHT (MIN 2 VIEWS)   Final Result   1. Immediate postsurgical change of reverse right total shoulder arthroplasty   without evidence of acute postoperative complication.                    Current Meds:  Current Facility-Administered Medications   Medication Dose Route Frequency    aluminum & magnesium hydroxide-simethicone (MAALOX) 200-200-20 MG/5ML suspension 30 mL  30 mL Oral Q6H PRN    pantoprazole (PROTONIX) 40 mg in sodium chloride (PF) 10 mL injection  40 mg IntraVENous Q12H    tuberculin injection 5 Units  5 Units IntraDERmal Once    ceFAZolin (ANCEF) 1,000 mg in sodium chloride 0.9 % 50 mL IVPB (mini-bag)  1,000 mg IntraVENous Q8H    docusate sodium (COLACE) capsule 100 mg  100 mg Oral BID    HYDROmorphone (DILAUDID) tablet 2 mg  2 mg Oral Q3H PRN    HYDROmorphone (DILAUDID) tablet 4 mg  4 mg Oral Q3H PRN    HYDROmorphone HCl PF (DILAUDID) injection 1 mg  1 mg IntraVENous Q1H PRN    bisacodyl (DULCOLAX) suppository 10 mg  10 mg Rectal Daily PRN    ondansetron (ZOFRAN-ODT) disintegrating tablet 4 mg  4 mg Oral Q8H PRN    promethazine (PHENERGAN) tablet 25 mg  25 mg Oral Q4H PRN    ferrous sulfate (IRON 325) tablet 325 mg  325 mg Oral BID WC    sodium chloride flush 0.9 % injection 5-40 mL  5-40 mL IntraVENous 2 times per day    sodium chloride flush 0.9 % injection 5-40 mL  5-40 mL IntraVENous PRN    0.9 % sodium chloride infusion   IntraVENous PRN    fenofibrate (TRIGLIDE) tablet 160 mg  160 mg Oral Daily    lisinopril-hydroCHLOROthiazide (PRINZIDE;ZESTORETIC) 20-25 MG per tablet 1 tablet  1 tablet Oral Daily    rivaroxaban (XARELTO) tablet 20 mg  20 mg Oral Dinner    tamsulosin (FLOMAX) capsule 0.4 mg  0.4 mg Oral Daily    [Held by provider] metoprolol succinate (TOPROL XL) extended release tablet 50 mg  50 mg Oral Daily       Signed:  Anastacio Mcmillan MD

## 2022-07-16 NOTE — PROGRESS NOTES
[facility]        2022         Post Op day: 1 Day Post-Op Procedure(s) (LRB):  REVERSE RIGHT TOTAL SHOULDER ARTHROPLASTY WITH DELTA EXTEND PROSTHESIS, BICEPS TENODESIS (Right)      Admit Date: 7/15/2022  Admit Diagnosis: Closed 4-part fracture of surgical neck of right humerus, initial encounter [S42.241A]  Other closed fracture of shaft of right humerus, initial encounter [S42.391A]  Degenerative joint disease of right acromioclavicular joint [M19.011]       Principle Problem: Closed 4-part fracture of surgical neck of right humerus. Subjective: Doing well, No complaints, No SOB, No Chest Pain, No Nausea or Vomiting, patient states he has a burning in his chest and feels like he's belching \"fireballs\".       Objective:   Vital Signs are Stable, No Acute Distress, Alert,  Dressing is Dry,  Neurovascular exam is normal.      Assessment / Plan :  Patient Active Problem List   Diagnosis    Primary hypertension    S/P prosthetic total arthroplasty of the hip    Diastolic dysfunction    Osteoarthritis of left hip    Tobacco abuse    Closed 4-part fracture of surgical neck of right humerus    Closed fracture of shaft of right humerus    Degenerative joint disease of right acromioclavicular joint    Closed 4-part fracture of surgical neck of right humerus, initial encounter    Osteoarthritis of right shoulder    Chronic venous embolism and thrombosis of deep vessels of left lower extremity (HCC)    Disorder of lipid metabolism    FORTINO (obstructive sleep apnea)    Narcotic drug use    Paroxysmal atrial fibrillation (HCC)    Venous stasis syndrome    Patient Vitals for the past 8 hrs:   BP Temp Temp src Pulse Resp SpO2   22 0808 -- -- -- 85 18 94 %   22 0745 -- -- -- -- 16 --   22 07 104/70 98.1 °F (36.7 °C) Oral 88 (!) 40 (!) 89 %   22 0356 129/78 98.4 °F (36.9 °C) Oral 77 20 92 %   22 0048 138/63 97.8 °F (36.6 °C) Oral 92 22 91 %    Temp (24hrs), Av °F (36.7 °C), Min:97.5 °F (36.4 °C), Max:98.4 °F (36.9 °C)    Body mass index is 35.15 kg/m². Lab Results   Component Value Date/Time    HGB 8.5 07/16/2022 03:48 AM          S/P Procedure(s) (LRB):  REVERSE RIGHT TOTAL SHOULDER ARTHROPLASTY WITH DELTA EXTEND PROSTHESIS, BICEPS TENODESIS (Right)      Medical management with Hospitalist.   Anticoagulation plan: pt takes Xarelto 20mg daily   Continue PT as ordered  Continue to monitor  Fall Precautions  DC disp: to home when stable, will stay through the weekend.            Signed By: PROMISE Nichols CNP  7/16/2022,  8:38 AM

## 2022-07-16 NOTE — PROGRESS NOTES
PHYSICAL THERAPY: SHOULDER Initial Assessment and AM  (Link to Caseload Tracking: PT Visit Days : 1  Acknowledge Orders Time In/Out  PT Charge Capture  Rehab Caseload Tracker    Wm Maxwell is a 80 y.o. male   PRIMARY DIAGNOSIS: Closed 4-part fracture of surgical neck of right humerus  Closed 4-part fracture of surgical neck of right humerus, initial encounter Cecillia Given  Other closed fracture of shaft of right humerus, initial encounter [S42.391A]  Degenerative joint disease of right acromioclavicular joint [M19.011]  Procedure(s) (LRB):  REVERSE RIGHT TOTAL SHOULDER ARTHROPLASTY WITH DELTA EXTEND PROSTHESIS, BICEPS TENODESIS (Right)  1 Day Post-Op  Reason for Referral: Pain in Right Shoulder (M25.511)  Stiffness of Right Shoulder, Not elsewhere classified (M25.611)  Generalized Muscle Weakness (M62.81)  Other lack of cordination (R27.8)  Difficulty in walking, Not elsewhere classified (R26.2)  Other abnormalities of gait and mobility (R26.89)  History of falling (Z91.81)  Observation: Payor: [employer] MEDICARE / Plan: Stephania [employer] PPO / Product Type: Medicare /     MOVEMENT PRECAUTIONS   TWICE A DAY   Elbow, hand, gentle pendulums right shoulder. NO OTHER MOTION. REHAB RECOMMENDATIONS:   Recommendation to date pending progress:  Setting:  Home Health Therapy    Equipment:    To Be Determined     ASSESSMENT:   ASSESSMENT:  Mr. Khai Wagner presents with sore & stiff right UE along with impaired functional mobility s/p reverse TSA. This pt will benefit from follow up therapy to review & establish HEP in addition to helping restore pt's functional mobility to a safe & manageable level. PT will follow up in pm to review HEP & safe function. In pm session pt & spouse reviewed HEP & precautions, also pt showed better gait quality. This pt needs to be performing exercises that he can do in the chair throughout the day & come out of the sling more often to desensitize the right UE.       [address] [facility] [address] [address] AM-PAC 6 Clicks Basic Mobility Inpatient Short Form  AM-PAC Mobility Inpatient   How much difficulty turning over in bed?: A Little  How much difficulty sitting down on / standing up from a chair with arms?: A Little  How much difficulty moving from lying on back to sitting on side of bed?: A Little  How much help from another person moving to and from a bed to a chair?: A Little  How much help from another person needed to walk in hospital room?: A Little  How much help from another person for climbing 3-5 steps with a railing?: Total  AM-PAC Inpatient Mobility Raw Score : 16  AM-PAC Inpatient T-Scale Score : 40.78  Mobility Inpatient CMS 0-100% Score: 54.16  Mobility Inpatient CMS G-Code Modifier : CK    SUBJECTIVE:   Mr. Janes Silvestre states, that he is agreeable to therapy    Social/Functional Lives With: Spouse  Type of Home: House  Home Layout: One level, Able to Live on Main level with bedroom/bathroom  Home Access: Stairs to enter without rails  Entrance Stairs - Number of Steps: 2  Ambulation Assistance: Independent (with cane for long distances)  Transfer Assistance: Independent    OBJECTIVE:     PAIN: VITAL SIGNS: LINES/DRAINS:   Pre Treatment:  Pain Assessment: 0-10  Pain Level: 4  Pain Location: Shoulder  Pain Orientation: Right  Non-Pharmaceutical Pain Intervention(s): Cold pack; Ambulation/Increased Activity; Exercise;Repositioned      Post Treatment: 4/10 Vitals NT       Oxygen NT    IV    RESTRICTIONS/PRECAUTIONS:  Restrictions/Precautions: Fall Risk                   HAND DOMINANCE:  Left []  Right [x]      UPPER EXTREMITY GROSS EVALUATION:  RIGHT UE   Within Functional Limits   Abnormal   Comments   Strength [] [x]  2/5 right elbow, wrist & hand grossly   Active Range of Motion [] [x]  NT shld, wrist & hand  decreased but WFL, elbow only 50% of there normal ROM   Passive Range of Motion           [] []        LEFT UE Within Functional Limits   Abnormal   Comments   Strength [x] []     Active Range of Motion [x] []     Passive Range of Motion [] []       LOWER EXTREMITY GROSS EVALUATION:     Within Functional Limits   Abnormal   Comments   Strength [] [x] Decreased but functional   Range of Motion [] [x] Decreased but functional       COGNITION/  PERCEPTION: Intact Impaired (Comments):   Orientation [x]     Vision [x]     Hearing [x]     Cognition  [x]       MOBILITY: I Mod I S SBA CGA Min Mod Max Total  NT x2 Comments:   Bed Mobility    Rolling [] [] [] [] [] [] [] [] [] [] []    Supine to Sit [] [] [] [] [] [] [] [] [] [] []    Scooting [] [] [] [] [x] [] [] [] [] [] []    Sit to Supine [] [] [] [] [] [] [x] [] [] [] []    Transfers    Sit to Stand [] [] [] [] [x] [] [] [] [] [] []     Bed to Chair [] [] [] [] [x] [] [] [] [] [] []    Stand to Sit [] [] [] [] [x] [] [] [] [] [] []    Stand Pivot [] [] [] [] [x] [] [] [] [] [] []    Toilet [] [] [] [] [] [] [] [] [] [] []     [] [] [] [] [] [] [] [] [] [] []    I=Independent, Mod I=Modified Independent, S=Supervision, SBA=Standby Assistance, CGA=Contact Guard Assistance,   Min=Minimal Assistance, Mod=Moderate Assistance, Max=Maximal Assistance, Total=Total Assistance, NT=Not Tested    GAIT: I Mod I S SBA CGA Min Mod Max Total  NT x2 Comments:   Level of Assistance [] [] [] [] [x] [] [] [] [] [] []    Weightbearing Status  Restrictions/Precautions  No LE WB restrictions    Distance  150     Gait Quality Decreased john , Decreased step clearance, Decreased step length, and mild sway    DME none      Stairs Stairs/Curb  Stairs?:  (NT)    I=Independent, Mod I=Modified Independent, S=Supervision, SBA=Standby Assistance, CGA=Contact Guard Assistance,   Min=Minimal Assistance, Mod=Moderate Assistance, Max=Maximal Assistance, Total=Total Assistance, NT=Not Tested    BALANCE: Good Fair+ Fair Fair- Poor NT Comments   Sitting Static [] [] [x] [] [] []    Sitting Dynamic [] [] [x] [] [] []              Standing Static [] [] [x] [x] [] []    Standing Dynamic [] [] [x] [x] [] []      PLAN:   ACUTE PHYSICAL THERAPY GOALS:   (Developed with and agreed upon by patient and/or caregiver.)  STG:  (1.)Mr. Rachid Baum will move from supine to sit and sit to supine  with CONTACT GUARD ASSIST.  (2.)Mr. Rachid Baum will transfer from bed to chair and chair to bed with STAND BY ASSIST using device PRN. .    (3.)Mr. Rachid Baum will ambulate with STAND BY ASSIST for 200 feet with device PRN. 4) pt able to go up & down 2 steps with minimal assist & device PRN. 5) pt safe with HEP Per MD Rx, with written guidelines & with spouses help.  ________________________________________________________________________________________________     FREQUENCY AND DURATION: BID for duration of hospital stay or until stated goals are met, whichever comes first.    THERAPY PROGNOSIS: Good    PROBLEM LIST:   (Skilled intervention is medically necessary to address:)  Decreased ADL/Functional Activities  Decreased Activity Tolerance  Decreased AROM/PROM  Decreased Balance  Decreased Coordination  Decreased Gait Ability  Decreased Safety Awareness  Decreased Strength  Decreased Transfer Abilities  Increased Pain   INTERVENTIONS PLANNED:   (Benefits and precautions of physical therapy have been discussed with the patient.)  Therapeutic Activity  Therapeutic Exercise/HEP  Neuromuscular Re-education  Gait Training  Manual Therapy  Education       TREATMENT:   EVALUATION: MODERATE COMPLEXITY: (Untimed Charge)    TREATMENT:   Therapeutic Activity (30 Minutes): Therapeutic activity included review HEP per Rx, sitting & standing balance activity, review PT expectations, review of safety & precautions, progressive gait training, bed mobility  to improve functional Activity tolerance, Balance, Coordination, Mobility, Strength, and ROM.     TREATMENT GRID:   Date:  7/16 Date:   Date:     ACTIVITY/EXERCISE: AM PM AM PM AM PM   Gripping 25 25       Wrist Flexion/Extension 25 25       Wrist Ulnar/Radial Deviation         Pronation/Supination 25 25 Elbow Flexion/Extension 25aa 25aa       Shoulder Flexion/Extension         Shoulder AB/ADduction         Shoulder IR/ER         Pulleys         Pendulums 25p CW & CCW 25p  CW & CCW       Shrugs         ISOMETRIC:                          Flexion         Extension         ABduction         ADduction         Biceps/Triceps         B = bilateral; AA = active assistive; A = active; P = passive      EDUCATION: Education Given To: Patient; Family;Caregiver  Education Provided: Home Exercise Program;Precautions;Transfer Training;IADL Safety;Orientation  Education Method: Demonstration;Verbal;Teach Back;Printed Information/Hand-outs  Education Outcome: Verbalized understanding;Continued education needed  EDUCATION:  [x]  Home Exercises  [x]  Sling Application [x]  Movement Precautions   []  Pulleys [x]  Use of Ice  []  Other:      AFTER TREATMENT PRECAUTIONS: Bed, Bed/Chair Locked, Call light within reach, Needs within reach, RN notified, and Visitors at bedside    INTERDISCIPLINARY COLLABORATION:  RN/ PCT    COMPLIANCE WITH PROGRAM/EXERCISES: Will assess as treatment progresses. RECOMMENDATIONS/INTENT FOR NEXT TREATMENT SESSION: Treatment next visit will focus on increasing Mr. Carolina Nunez independence with bed mobility, transfers, gait training, strength/ROM exercises, modalities for pain, and patient education. TIME IN/OUT:  Time In: 1510  Time Out: Ana 16  Minutes: [address]  Huma Gaston

## 2022-07-16 NOTE — CARE COORDINATION
07/16/22 1025   Service Assessment   Patient Orientation Alert and Oriented   Support Systems Spouse/Significant Other   PCP Verified by CM Yes   Services At/After Discharge   Transition of Care Consult (CM Consult) [address] [place] [address] Yes   Condition of Participation: Discharge Planning   The Plan for Transition of Care is related to the following treatment goals: Increase independence   The Patient and/or Patient Representative was provided with a Choice of Provider? Patient   Freedom of Choice list was provided with basic dialogue that supports the patient's individualized plan of care/goals, treatment preferences, and shares the quality data associated with the providers?   Yes

## 2022-07-16 NOTE — PROGRESS NOTES
PHYSICAL THERAPY: SHOULDER Initial Assessment and AM  (Link to Caseload Tracking: PT Visit Days : 1  Acknowledge Orders Time In/Out  PT Charge Capture  Rehab Caseload Tracker    Odalis Enamorado is a 80 y.o. 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 [M19.011]  Procedure(s) (LRB):  REVERSE RIGHT TOTAL SHOULDER ARTHROPLASTY WITH DELTA EXTEND PROSTHESIS, BICEPS TENODESIS (Right)  1 Day Post-Op  Reason for Referral: Pain in Right Shoulder (M25.511)  Stiffness of Right Shoulder, Not elsewhere classified (M25.611)  Generalized Muscle Weakness (M62.81)  Other lack of cordination (R27.8)  Difficulty in walking, Not elsewhere classified (R26.2)  Other abnormalities of gait and mobility (R26.89)  History of falling (Z91.81)  Observation: Payor: [employer] MEDICARE / Plan: [address] PPO / Product Type: Medicare /     MOVEMENT PRECAUTIONS   TWICE A DAY   Elbow, hand, gentle pendulums right shoulder. NO OTHER MOTION. REHAB RECOMMENDATIONS:   Recommendation to date pending progress:  Setting:  Home Health Therapy    Equipment:    To Be Determined     ASSESSMENT:   ASSESSMENT:  Mr. Shannon Friday presents with sore & stiff right UE along with impaired functional mobility s/p reverse TSA. This pt will benefit from follow up therapy to review & establish HEP in addition to helping restore pt's functional mobility to a safe & manageable level. PT will follow up in pm to review HEP & safe function.      [address] [facility] [address] [address] AM-PAC 6 Clicks Basic Mobility Inpatient Short Form  AM-PAC Mobility Inpatient   How much difficulty turning over in bed?: A Little  How much difficulty sitting down on / standing up from a chair with arms?: A Little  How much difficulty moving from lying on back to sitting on side of bed?: A Little  How much help from another person moving to and from a bed to a chair?: A Little  How much help from another person needed to walk in hospital room?: A Little  How much help from another person for climbing 3-5 steps with a railing?: Total  AM-PAC Inpatient Mobility Raw Score : 16  AM-PAC Inpatient T-Scale Score : 40.78  Mobility Inpatient CMS 0-100% Score: 54.16  Mobility Inpatient CMS G-Code Modifier : CK    SUBJECTIVE:   Mr. Khai Wagner states, that he is agreeable to therapy    Social/Functional Lives With: Spouse  Type of Home: House  Home Layout: One level, Able to Live on Main level with bedroom/bathroom  Home Access: Stairs to enter without rails  Entrance Stairs - Number of Steps: 2  Ambulation Assistance: Independent (with cane for long distances)  Transfer Assistance: Independent    OBJECTIVE:     PAIN: VITAL SIGNS: LINES/DRAINS:   Pre Treatment:  Pain Assessment: 0-10  Pain Level: 4  Pain Location: Shoulder  Pain Orientation: Right  Non-Pharmaceutical Pain Intervention(s): Cold pack; Ambulation/Increased Activity; Exercise;Repositioned      Post Treatment: 4/10 Vitals NT       Oxygen NT    IV    RESTRICTIONS/PRECAUTIONS:  Restrictions/Precautions: Fall Risk                   HAND DOMINANCE:  Left []  Right [x]      UPPER EXTREMITY GROSS EVALUATION:  RIGHT UE   Within Functional Limits   Abnormal   Comments   Strength [] [x]  2/5 right elbow, wrist & hand grossly   Active Range of Motion [] [x]  NT shld, wrist & hand  decreased but WFL, elbow only 50% of there normal ROM   Passive Range of Motion           [] []        LEFT UE Within Functional Limits   Abnormal   Comments   Strength [x] []     Active Range of Motion [x] []     Passive Range of Motion [] []       LOWER EXTREMITY GROSS EVALUATION:     Within Functional Limits   Abnormal   Comments   Strength [] [x] Decreased but functional   Range of Motion [] [x] Decreased but functional COGNITION/  PERCEPTION: Intact Impaired (Comments):   Orientation [x]     Vision [x]     Hearing [x]     Cognition  [x]       MOBILITY: I Mod I S SBA CGA Min Mod Max Total  NT x2 Comments:   Bed Mobility    Rolling [] [] [] [] [] [] [x] [] [] [] []    Supine to Sit [] [] [] [] [] [] [x] [] [] [] []    Scooting [] [] [] [] [] [] [x] [] [] [] []    Sit to Supine [] [] [] [] [] [] [] [] [] [] []    Transfers    Sit to Stand [] [] [] [] [] [x] [] [] [] [] []     Bed to Chair [] [] [] [] [] [x] [] [] [] [] [] Holding IVpole   Stand to Sit [] [] [] [] [] [x] [] [] [] [] []    Stand Pivot [] [] [] [] [] [x] [] [] [] [] []    Toilet [] [] [] [] [] [] [] [] [] [] []     [] [] [] [] [] [] [] [] [] [] []    I=Independent, Mod I=Modified Independent, S=Supervision, SBA=Standby Assistance, CGA=Contact Guard Assistance,   Min=Minimal Assistance, Mod=Moderate Assistance, Max=Maximal Assistance, Total=Total Assistance, NT=Not Tested    GAIT: I Mod I S SBA CGA Min Mod Max Total  NT x2 Comments:   Level of Assistance [] [] [] [] [] [] [] [] [] [] []    Weightbearing Status  Restrictions/Precautions  No LE WB restrictions    Distance  additional 40ft after an initial 20ft gait assessment     Gait Quality Decreased john , Decreased step clearance, Decreased step length, and Trunk sway increased    DME Holding onto IV pole      Stairs Stairs/Curb  Stairs?:  (NT)    I=Independent, Mod I=Modified Independent, S=Supervision, SBA=Standby Assistance, CGA=Contact Guard Assistance,   Min=Minimal Assistance, Mod=Moderate Assistance, Max=Maximal Assistance, Total=Total Assistance, NT=Not Tested    BALANCE: Good Fair+ Fair Fair- Poor NT Comments   Sitting Static [] [] [x] [] [] []    Sitting Dynamic [] [] [x] [] [] []              Standing Static [] [] [x] [x] [] [] With  IV pole   Standing Dynamic [] [] [x] [x] [] [] With  IV pole     PLAN:   ACUTE PHYSICAL THERAPY GOALS:   (Developed with and agreed upon by patient and/or caregiver.)  STG:  (1.)Mr. Blanca Neil will move from supine to sit and sit to supine  with CONTACT GUARD ASSIST.  (2.)Mr. Blanca Neil will transfer from bed to chair and chair to bed with STAND BY ASSIST using device PRN. .    (3.)Mr. Blanca Neil will ambulate with STAND BY ASSIST for 200 feet with device PRN. 4) pt able to go up & down 2 steps with minimal assist & device PRN. 5) pt safe with HEP Per MD Rx, with written guidelines & with spouses help.  ________________________________________________________________________________________________     FREQUENCY AND DURATION: BID for duration of hospital stay or until stated goals are met, whichever comes first.    THERAPY PROGNOSIS: Good    PROBLEM LIST:   (Skilled intervention is medically necessary to address:)  Decreased ADL/Functional Activities  Decreased Activity Tolerance  Decreased AROM/PROM  Decreased Balance  Decreased Coordination  Decreased Gait Ability  Decreased Safety Awareness  Decreased Strength  Decreased Transfer Abilities  Increased Pain   INTERVENTIONS PLANNED:   (Benefits and precautions of physical therapy have been discussed with the patient.)  Therapeutic Activity  Therapeutic Exercise/HEP  Neuromuscular Re-education  Gait Training  Manual Therapy  Education       TREATMENT:   EVALUATION: MODERATE COMPLEXITY: (Untimed Charge)    TREATMENT:   Therapeutic Activity (38 Minutes): Therapeutic activity included review HEP per Rx, review PT role, POC & PT expectations, review of safety & precautions, progressive gait training an additional 40 ft after an initial 20 ft gait assessment  to improve functional Activity tolerance, Balance, Coordination, Mobility, Strength, and ROM.     TREATMENT GRID:   Date:  7/16 Date:   Date:     ACTIVITY/EXERCISE: AM PM AM PM AM PM   Gripping 25        Wrist Flexion/Extension 25        Wrist Ulnar/Radial Deviation         Pronation/Supination 25        Elbow Flexion/Extension 25aa        Shoulder Flexion/Extension         Shoulder AB/ADduction         Shoulder IR/ER         Pulleys         Pendulums 25p CW & CCW        Shrugs         ISOMETRIC:                          Flexion         Extension         ABduction         ADduction         Biceps/Triceps         B = bilateral; AA = active assistive; A = active; P = passive      EDUCATION: Education Given To: Patient; Family;Caregiver  Education Provided: Role of Therapy;Home Exercise Program;Plan of Care;Precautions;Transfer Training;IADL Safety  Education Method: Demonstration;Verbal;Teach Back;Printed Information/Hand-outs  Education Outcome: Continued education needed  EDUCATION:  [x]  Home Exercises  [x]  Sling Application [x]  Movement Precautions   []  Pulleys [x]  Use of Ice  []  Other:      AFTER TREATMENT PRECAUTIONS: Bed/Chair Locked, Call light within reach, Chair, Needs within reach, RN notified, and Visitors at bedside    INTERDISCIPLINARY COLLABORATION:  MD/ PA/ NP  and RN/ PCT    COMPLIANCE WITH PROGRAM/EXERCISES: Will assess as treatment progresses. RECOMMENDATIONS/INTENT FOR NEXT TREATMENT SESSION: Treatment next visit will focus on increasing Mr. Lizet Machuca independence with bed mobility, transfers, gait training, strength/ROM exercises, modalities for pain, and patient education. TIME IN/OUT:  Time In: 6620  Time Out: [facility]  Minutes: [facility].  [facility]

## 2022-07-17 ENCOUNTER — HOME HEALTH ADMISSION (OUTPATIENT)
Dept: HOME HEALTH SERVICES | Facility: HOME HEALTH | Age: 72
End: 2022-07-17
Payer: MEDICARE

## 2022-07-17 LAB
ANION GAP SERPL CALC-SCNC: 6 MMOL/L (ref 7–16)
BUN SERPL-MCNC: 15 MG/DL (ref 8–23)
CALCIUM SERPL-MCNC: 8.6 MG/DL (ref 8.3–10.4)
CHLORIDE SERPL-SCNC: 99 MMOL/L (ref 98–107)
CO2 SERPL-SCNC: 28 MMOL/L (ref 21–32)
CREAT SERPL-MCNC: 0.71 MG/DL (ref 0.8–1.5)
ERYTHROCYTE [DISTWIDTH] IN BLOOD BY AUTOMATED COUNT: 13.9 % (ref 11.9–14.6)
GLUCOSE SERPL-MCNC: 130 MG/DL (ref 65–100)
HCT VFR BLD AUTO: 22 % (ref 41.1–50.3)
HCT VFR BLD AUTO: 23.7 % (ref 41.1–50.3)
HGB BLD-MCNC: 7.4 G/DL (ref 13.6–17.2)
HGB BLD-MCNC: 7.8 G/DL (ref 13.6–17.2)
MAGNESIUM SERPL-MCNC: 2.1 MG/DL (ref 1.8–2.4)
MCH RBC QN AUTO: 31.1 PG (ref 26.1–32.9)
MCHC RBC AUTO-ENTMCNC: 33.6 G/DL (ref 31.4–35)
MCV RBC AUTO: 92.4 FL (ref 79.6–97.8)
NRBC # BLD: 0 K/UL (ref 0–0.2)
PLATELET # BLD AUTO: 209 K/UL (ref 150–450)
PMV BLD AUTO: 9.3 FL (ref 9.4–12.3)
POTASSIUM SERPL-SCNC: 3.6 MMOL/L (ref 3.5–5.1)
RBC # BLD AUTO: 2.38 M/UL (ref 4.23–5.6)
SODIUM SERPL-SCNC: 133 MMOL/L (ref 136–145)
WBC # BLD AUTO: 8.1 K/UL (ref 4.3–11.1)

## 2022-07-17 PROCEDURE — 85027 COMPLETE CBC AUTOMATED: CPT

## 2022-07-17 PROCEDURE — 6360000002 HC RX W HCPCS: Performed by: NURSE PRACTITIONER

## 2022-07-17 PROCEDURE — 83735 ASSAY OF MAGNESIUM: CPT

## 2022-07-17 PROCEDURE — 1100000000 HC RM PRIVATE

## 2022-07-17 PROCEDURE — 96375 TX/PRO/DX INJ NEW DRUG ADDON: CPT

## 2022-07-17 PROCEDURE — 85014 HEMATOCRIT: CPT

## 2022-07-17 PROCEDURE — 6360000002 HC RX W HCPCS: Performed by: ORTHOPAEDIC SURGERY

## 2022-07-17 PROCEDURE — A4216 STERILE WATER/SALINE, 10 ML: HCPCS | Performed by: FAMILY MEDICINE

## 2022-07-17 PROCEDURE — C9113 INJ PANTOPRAZOLE SODIUM, VIA: HCPCS | Performed by: FAMILY MEDICINE

## 2022-07-17 PROCEDURE — 36415 COLL VENOUS BLD VENIPUNCTURE: CPT

## 2022-07-17 PROCEDURE — 80048 BASIC METABOLIC PNL TOTAL CA: CPT

## 2022-07-17 PROCEDURE — 2500000003 HC RX 250 WO HCPCS: Performed by: ORTHOPAEDIC SURGERY

## 2022-07-17 PROCEDURE — 6360000002 HC RX W HCPCS: Performed by: FAMILY MEDICINE

## 2022-07-17 PROCEDURE — 2580000003 HC RX 258: Performed by: FAMILY MEDICINE

## 2022-07-17 PROCEDURE — 96376 TX/PRO/DX INJ SAME DRUG ADON: CPT

## 2022-07-17 PROCEDURE — G0378 HOSPITAL OBSERVATION PER HR: HCPCS

## 2022-07-17 PROCEDURE — 6370000000 HC RX 637 (ALT 250 FOR IP): Performed by: ORTHOPAEDIC SURGERY

## 2022-07-17 PROCEDURE — 97530 THERAPEUTIC ACTIVITIES: CPT

## 2022-07-17 PROCEDURE — 2580000003 HC RX 258: Performed by: ORTHOPAEDIC SURGERY

## 2022-07-17 RX ORDER — HYDROMORPHONE HYDROCHLORIDE 1 MG/ML
0.5 INJECTION, SOLUTION INTRAMUSCULAR; INTRAVENOUS; SUBCUTANEOUS ONCE
Status: COMPLETED | OUTPATIENT
Start: 2022-07-17 | End: 2022-07-17

## 2022-07-17 RX ADMIN — DOCUSATE SODIUM 100 MG: 100 CAPSULE ORAL at 21:06

## 2022-07-17 RX ADMIN — SODIUM CHLORIDE, PRESERVATIVE FREE 10 ML: 5 INJECTION INTRAVENOUS at 21:06

## 2022-07-17 RX ADMIN — HYDROMORPHONE HYDROCHLORIDE 0.5 MG: 1 INJECTION, SOLUTION INTRAMUSCULAR; INTRAVENOUS; SUBCUTANEOUS at 09:34

## 2022-07-17 RX ADMIN — LISINOPRIL AND HYDROCHLOROTHIAZIDE 1 TABLET: 25; 20 TABLET ORAL at 08:11

## 2022-07-17 RX ADMIN — FERROUS SULFATE TAB 325 MG (65 MG ELEMENTAL FE) 325 MG: 325 (65 FE) TAB at 08:12

## 2022-07-17 RX ADMIN — HYDROMORPHONE HYDROCHLORIDE 4 MG: 4 TABLET ORAL at 08:12

## 2022-07-17 RX ADMIN — DOCUSATE SODIUM 100 MG: 100 CAPSULE ORAL at 08:11

## 2022-07-17 RX ADMIN — HYDROMORPHONE HYDROCHLORIDE 4 MG: 4 TABLET ORAL at 00:40

## 2022-07-17 RX ADMIN — SODIUM CHLORIDE, PRESERVATIVE FREE 40 MG: 5 INJECTION INTRAVENOUS at 21:54

## 2022-07-17 RX ADMIN — HYDROMORPHONE HYDROCHLORIDE 1 MG: 1 INJECTION, SOLUTION INTRAMUSCULAR; INTRAVENOUS; SUBCUTANEOUS at 13:29

## 2022-07-17 RX ADMIN — DOXYCYCLINE 30 ML: 100 INJECTION, POWDER, LYOPHILIZED, FOR SOLUTION INTRAVENOUS at 21:06

## 2022-07-17 RX ADMIN — PROMETHAZINE HYDROCHLORIDE 25 MG: 25 TABLET ORAL at 21:06

## 2022-07-17 RX ADMIN — HYDROMORPHONE HYDROCHLORIDE 4 MG: 4 TABLET ORAL at 11:40

## 2022-07-17 RX ADMIN — FERROUS SULFATE TAB 325 MG (65 MG ELEMENTAL FE) 325 MG: 325 (65 FE) TAB at 17:50

## 2022-07-17 RX ADMIN — FENOFIBRATE 160 MG: 160 TABLET ORAL at 08:11

## 2022-07-17 RX ADMIN — HYDROMORPHONE HYDROCHLORIDE 4 MG: 4 TABLET ORAL at 21:13

## 2022-07-17 RX ADMIN — SODIUM CHLORIDE, PRESERVATIVE FREE 10 ML: 5 INJECTION INTRAVENOUS at 08:20

## 2022-07-17 RX ADMIN — HYDROMORPHONE HYDROCHLORIDE 4 MG: 4 TABLET ORAL at 05:22

## 2022-07-17 RX ADMIN — RIVAROXABAN 20 MG: 20 TABLET, FILM COATED ORAL at 17:50

## 2022-07-17 RX ADMIN — TAMSULOSIN HYDROCHLORIDE 0.4 MG: 0.4 CAPSULE ORAL at 08:11

## 2022-07-17 RX ADMIN — SODIUM CHLORIDE, PRESERVATIVE FREE 40 MG: 5 INJECTION INTRAVENOUS at 08:15

## 2022-07-17 ASSESSMENT — PAIN DESCRIPTION - LOCATION
LOCATION: SHOULDER

## 2022-07-17 ASSESSMENT — PAIN DESCRIPTION - ORIENTATION
ORIENTATION: RIGHT

## 2022-07-17 ASSESSMENT — PAIN DESCRIPTION - FREQUENCY: FREQUENCY: INTERMITTENT

## 2022-07-17 ASSESSMENT — PAIN SCALES - GENERAL
PAINLEVEL_OUTOF10: 3
PAINLEVEL_OUTOF10: 8
PAINLEVEL_OUTOF10: 5
PAINLEVEL_OUTOF10: 6
PAINLEVEL_OUTOF10: 4
PAINLEVEL_OUTOF10: 6
PAINLEVEL_OUTOF10: 4
PAINLEVEL_OUTOF10: 6
PAINLEVEL_OUTOF10: 10
PAINLEVEL_OUTOF10: 3
PAINLEVEL_OUTOF10: 5

## 2022-07-17 ASSESSMENT — PAIN DESCRIPTION - DESCRIPTORS
DESCRIPTORS: ACHING
DESCRIPTORS: THROBBING
DESCRIPTORS: ACHING

## 2022-07-17 ASSESSMENT — PAIN DESCRIPTION - ONSET: ONSET: GRADUAL

## 2022-07-17 ASSESSMENT — PAIN - FUNCTIONAL ASSESSMENT: PAIN_FUNCTIONAL_ASSESSMENT: PREVENTS OR INTERFERES SOME ACTIVE ACTIVITIES AND ADLS

## 2022-07-17 ASSESSMENT — PAIN DESCRIPTION - PAIN TYPE: TYPE: ACUTE PAIN;SURGICAL PAIN

## 2022-07-17 NOTE — PROGRESS NOTES
PHYSICAL THERAPY: SHOULDER Daily Note and PM  (Link to Caseload Tracking: PT Visit Days : 2  Acknowledge Orders Time In/Out  PT Charge Capture  Rehab Caseload Tracker    Pino Vizcarra is a 80 y.o. male   PRIMARY DIAGNOSIS: Closed 4-part fracture of surgical neck of right humerus  Closed 4-part fracture of surgical neck of right humerus, initial encounter Demetris Gunter  Other closed fracture of shaft of right humerus, initial encounter [S42.391A]  Degenerative joint disease of right acromioclavicular joint [M19.011]  Procedure(s) (LRB):  REVERSE RIGHT TOTAL SHOULDER ARTHROPLASTY WITH DELTA EXTEND PROSTHESIS, BICEPS TENODESIS (Right)  2 Days Post-Op  Reason for Referral: Pain in Right Shoulder (M25.511)  Stiffness of Right Shoulder, Not elsewhere classified (M25.611)  Generalized Muscle Weakness (M62.81)  Other lack of cordination (R27.8)  Difficulty in walking, Not elsewhere classified (R26.2)  Other abnormalities of gait and mobility (R26.89)  History of falling (Z91.81)  Observation: Payor: [employer] MEDICARE / Plan: Alex Pimentel PPO / Product Type: Medicare /     MOVEMENT PRECAUTIONS   TWICE A DAY   Elbow, hand, gentle pendulums right shoulder. NO OTHER MOTION. REHAB RECOMMENDATIONS:   Recommendation to date pending progress:  Setting:  Home Health Therapy    Equipment:    To Be Determined     ASSESSMENT:   ASSESSMENT:  Mr. Macarena Teresa showed better form with exercises & spouse understanding how to assist pt with exercises & positioning right UE to reduce edema. This pt's gait quality is slightly improved with increased step length, this pt may need a cane on uneven or inclined surfaces. Low Hgb made pt more easily SOB with minimal exertion. Will follow up in pm & review. In pm session pt performed all exercises without difficulty & with good understanding, along with reviewing with spouse how to assist pt with pendulums & elbow flex. PT also reviewed the mechanics on how to put on & take off a zamzam shirt. Pt's gait quality & stability is continuing to improve.      [address] [facility] [address] [address] AM-PAC 6 Clicks Basic Mobility Inpatient Short Form  AM-PAC Mobility Inpatient   How much difficulty turning over in bed?: A Little  How much difficulty sitting down on / standing up from a chair with arms?: A Little  How much difficulty moving from lying on back to sitting on side of bed?: A Little  How much help from another person moving to and from a bed to a chair?: A Little  How much help from another person needed to walk in hospital room?: A Little  How much help from another person for climbing 3-5 steps with a railing?: Total  AM-PAC Inpatient Mobility Raw Score : 16  AM-PAC Inpatient T-Scale Score : 40.78  Mobility Inpatient CMS 0-100% Score: 54.16  Mobility Inpatient CMS G-Code Modifier : CK    SUBJECTIVE:   Mr. Boubacar Weiss states, that he is agreeable to therapy    Social/Functional Lives With: Spouse  Type of Home: House  Home Layout: One level, Able to Live on Main level with bedroom/bathroom  Home Access: Stairs to enter without rails  Entrance Stairs - Number of Steps: 2  Ambulation Assistance: Independent (with cane for long distances)  Transfer Assistance: Independent    OBJECTIVE:     PAIN: VITAL SIGNS: LINES/DRAINS:   Pre Treatment:         Post Treatment: 3/10 Vitals NT       Oxygen NT    IV    RESTRICTIONS/PRECAUTIONS:  Restrictions/Precautions: Fall Risk                   HAND DOMINANCE:  Left []  Right [x]      UPPER EXTREMITY GROSS EVALUATION:  RIGHT UE   Within Functional Limits   Abnormal   Comments   Strength [] [x]  2/5 right elbow, wrist & hand grossly   Active Range of Motion [] [x]  NT shld, wrist & hand  decreased but WFL, elbow only 50% of there normal ROM   Passive Range of Motion           [] []        LEFT UE Within Functional Limits   Abnormal   Comments   Strength [x] []     Active Range of Motion [x] []     Passive Range of Motion [] []       LOWER EXTREMITY GROSS EVALUATION:     Within Functional Limits   Abnormal   Comments   Strength [] [x] Decreased but functional   Range of Motion [] [x] Decreased but functional       COGNITION/  PERCEPTION: Intact Impaired (Comments):   Orientation [x]     Vision [x]     Hearing [x]     Cognition  [x]       MOBILITY: I Mod I S SBA CGA Min Mod Max Total  NT x2 Comments:   Bed Mobility    Rolling [] [] [] [] [] [] [] [] [] [] []    Supine to Sit [] [] [] [] [] [] [] [] [] [] []    Scooting [] [] [] [] [x] [] [] [] [] [] []    Sit to Supine [] [] [] [] [] [] [] [] [] [] []    Transfers    Sit to Stand [] [] [] [x] [] [] [] [] [] [] []     Bed to Chair [] [] [] [x] [] [] [] [] [] [] []    Stand to Sit [] [] [] [x] [] [] [] [] [] [] []    Stand Pivot [] [] [] [x] [] [] [] [] [] [] []    Toilet [] [] [] [] [] [] [] [] [] [] []     [] [] [] [] [] [] [] [] [] [] []    I=Independent, Mod I=Modified Independent, S=Supervision, SBA=Standby Assistance, CGA=Contact Guard Assistance,   Min=Minimal Assistance, Mod=Moderate Assistance, Max=Maximal Assistance, Total=Total Assistance, NT=Not Tested    GAIT: I Mod I S SBA CGA Min Mod Max Total  NT x2 Comments:   Level of Assistance [] [] [] [x] [] [] [] [] [] [] []    Weightbearing Status  Restrictions/Precautions  No LE WB restrictions    Distance  150     Gait Quality Decreased john , Decreased step clearance, Decreased step length, and mild sway    DME none      Stairs Stairs/Curb  Stairs?:  (NT)    I=Independent, Mod I=Modified Independent, S=Supervision, SBA=Standby Assistance, CGA=Contact Guard Assistance,   Min=Minimal Assistance, Mod=Moderate Assistance, Max=Maximal Assistance, Total=Total Assistance, NT=Not Tested    BALANCE: Good Fair+ Fair Fair- Poor NT Comments   Sitting Static [] [] [x] [] [] []    Sitting Dynamic [] [] [x] [] [] []              Standing Static [] [] [x] [x] [] []    Standing Dynamic [] [] [x] [x] [] []      PLAN:   ACUTE PHYSICAL THERAPY GOALS:   (Developed with and agreed upon by patient and/or caregiver.)  GOALS MODIFIED 717/22 :  (1.)Mr. Markos Street will move from supine to sit and sit to supine  with CONTACT GUARD ASSIST.  (2.)Mr. Markos Street will transfer from bed to chair and chair to bed with STAND BY ASSIST (consistently) using device PRN. Met 7/17   (3.)Mr. Markos Street will ambulate with STAND BY ASSIST (consistently) for 200 feet with device PRN. 4) pt able to go up & down 2 steps with minimal assist & device PRN. 5) pt safe with HEP Per MD Rx, with written guidelines & with spouses help. Met 7/17  ________________________________________________________________________________________________     FREQUENCY AND DURATION: BID for duration of hospital stay or until stated goals are met, whichever comes first.    THERAPY PROGNOSIS: Good    PROBLEM LIST:   (Skilled intervention is medically necessary to address:)  Decreased ADL/Functional Activities  Decreased Activity Tolerance  Decreased AROM/PROM  Decreased Balance  Decreased Coordination  Decreased Gait Ability  Decreased Safety Awareness  Decreased Strength  Decreased Transfer Abilities  Increased Pain   INTERVENTIONS PLANNED:   (Benefits and precautions of physical therapy have been discussed with the patient.)  Therapeutic Activity  Therapeutic Exercise/HEP  Neuromuscular Re-education  Gait Training  Manual Therapy  Education       TREATMENT:   EVALUATION: MODERATE COMPLEXITY: (Untimed Charge)    TREATMENT:   Therapeutic Activity (28 Minutes): Therapeutic activity included review HEP per Rx, scooting activity,sitting & standing balance activity, review PT expectations, review of safety & precautions, progressive gait training, education on right UE positioning to reduce edema & review of the mechanics to put on & take off a t-shirt  to improve functional Activity tolerance, Balance, Coordination, Mobility, Strength, and ROM.     TREATMENT GRID:   Date:  7/16 Date:  7/17 Date:     ACTIVITY/EXERCISE: AM PM AM PM AM PM   Gripping 25 25 25 25     Wrist Flexion/Extension 25 25 25 25     Wrist Ulnar/Radial Deviation         Pronation/Supination 25 25 25 25     Elbow Flexion/Extension 25aa 25aa 25aa 25aa     Shoulder Flexion/Extension         Shoulder AB/ADduction         Shoulder IR/ER         Pulleys         Pendulums 25p CW & CCW 25p  CW & CCW 25p  CW & CCW 25p CW & CCW     Shrugs         ISOMETRIC:                          Flexion         Extension         ABduction         ADduction         Biceps/Triceps         B = bilateral; AA = active assistive; A = active; P = passive      EDUCATION: Education Given To: Patient; Family;Caregiver  Education Provided: Plan of Care;Home Exercise Program;Precautions;Transfer Training;IADL Safety  Education Method: Demonstration;Verbal;Teach Back;Printed Information/Hand-outs  Education Outcome: Verbalized understanding;Demonstrated understanding;Continued education needed  EDUCATION:  [x]  Home Exercises  [x]  Sling Application [x]  Movement Precautions   []  Pulleys [x]  Use of Ice  []  Other:      AFTER TREATMENT PRECAUTIONS: Bed/Chair Locked, Call light within reach, Chair, Needs within reach, RN notified, and Visitors at bedside    INTERDISCIPLINARY COLLABORATION:  RN/ PCT    COMPLIANCE WITH PROGRAM/EXERCISES: Will assess as treatment progresses. RECOMMENDATIONS/INTENT FOR NEXT TREATMENT SESSION: Treatment next visit will focus on increasing Mr. Nayla Sullivan independence with bed mobility, transfers, gait training, strength/ROM exercises, modalities for pain, and patient education. TIME IN/OUT:  Time In: 1426  Time Out: [address]  Minutes: Rocco 5655.  Eloisa Villa

## 2022-07-17 NOTE — PROGRESS NOTES
[facility]        2022         Post Op day: 2 Days Post-Op Procedure(s) (LRB):  REVERSE RIGHT TOTAL SHOULDER ARTHROPLASTY WITH DELTA EXTEND PROSTHESIS, BICEPS TENODESIS (Right)      Admit Date: 7/15/2022  Admit Diagnosis: Closed 4-part fracture of surgical neck of right humerus, initial encounter [S42.241A]  Other closed fracture of shaft of right humerus, initial encounter [S42.391A]  Degenerative joint disease of right acromioclavicular joint [M19.011]       Principle Problem: Closed 4-part fracture of surgical neck of right humerus. Subjective: Doing well, No complaints, No SOB, No Chest Pain, No Nausea or Vomiting, patient states sensations of heartburn were better last night. He is in excruciating pain this morning after therapy. He is requesting an additional dose of pain medication.       Objective:   Vital Signs are Stable, No Acute Distress, Alert,  Dressing is Dry,  Neurovascular exam is normal.      Assessment / Plan :  Patient Active Problem List   Diagnosis    Primary hypertension    S/P prosthetic total arthroplasty of the hip    Diastolic dysfunction    Osteoarthritis of left hip    Tobacco abuse    Closed 4-part fracture of surgical neck of right humerus    Closed fracture of shaft of right humerus    Degenerative joint disease of right acromioclavicular joint    Closed 4-part fracture of surgical neck of right humerus, initial encounter    Osteoarthritis of right shoulder    Chronic venous embolism and thrombosis of deep vessels of left lower extremity (HCC)    Disorder of lipid metabolism    FORTINO (obstructive sleep apnea)    Narcotic drug use    Paroxysmal atrial fibrillation (HCC)    Venous stasis syndrome    Patient Vitals for the past 8 hrs:   BP Temp Temp src Pulse Resp SpO2   22 0812 -- -- -- -- 16 --   22 0727 115/64 98.6 °F (37 °C) Oral 84 18 91 %   22 0430 119/65 97.8 °F (36.6 °C) Axillary 78 16 96 %    Temp (24hrs), Av.6 °F (37 °C), Min:97.8 °F (36.6 °C), Max:99.1 °F (37.3 °C)    Body mass index is 35.15 kg/m². Lab Results   Component Value Date/Time    HGB 7.4 07/17/2022 04:36 AM          S/P Procedure(s) (LRB):  REVERSE RIGHT TOTAL SHOULDER ARTHROPLASTY WITH DELTA EXTEND PROSTHESIS, BICEPS TENODESIS (Right)      Medical management with Hospitalist.   Anticoagulation plan: pt takes Xarelto 20mg daily   Continue PT as ordered  Continue to monitor  One time dilaudid dose for pain control ordered. Labs stable- Hgb trending down, will get [facility] later today. Fall Precautions  DC disp: to home when stable, will stay through the weekend to see Dr Chris Ruiz Monday.            Signed By: PROMISE Nichols CNP  7/17/2022,  8:32 AM

## 2022-07-17 NOTE — PROGRESS NOTES
PHYSICAL THERAPY: SHOULDER Daily Note and AM  (Link to Caseload Tracking: PT Visit Days : 2  Acknowledge Orders Time In/Out  PT Charge Capture  Rehab Caseload Tracker    Petar Robison is a 80 y.o. 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 [M19.011]  Procedure(s) (LRB):  REVERSE RIGHT TOTAL SHOULDER ARTHROPLASTY WITH DELTA EXTEND PROSTHESIS, BICEPS TENODESIS (Right)  2 Days Post-Op  Reason for Referral: Pain in Right Shoulder (M25.511)  Stiffness of Right Shoulder, Not elsewhere classified (M25.611)  Generalized Muscle Weakness (M62.81)  Other lack of cordination (R27.8)  Difficulty in walking, Not elsewhere classified (R26.2)  Other abnormalities of gait and mobility (R26.89)  History of falling (Z91.81)  Observation: Payor: [employer] MEDICARE / Plan: Aroldo Noyola PPO / Product Type: Medicare /     MOVEMENT PRECAUTIONS   TWICE A DAY   Elbow, hand, gentle pendulums right shoulder. NO OTHER MOTION. REHAB RECOMMENDATIONS:   Recommendation to date pending progress:  Setting:  Home Health Therapy    Equipment:    To Be Determined     ASSESSMENT:   ASSESSMENT:  Mr. Sammie Soto showed better form with exercises & spouse understanding how to assist pt with exercises & positioning right UE to reduce edema. This pt's gait quality is slightly improved with increased step length, this pt may need a cane on uneven or inclined surfaces. Low Hgb made pt more easily SOB with minimal exertion. Will follow up in pm & review.      [address] [facility] [address] [address] AM-PAC 6 Clicks Basic Mobility Inpatient Short Form  AM-PAC Mobility Inpatient   How much difficulty turning over in bed?: A Little  How much difficulty sitting down on / standing up from a chair with arms?: A Little  How much difficulty moving from lying on back to sitting on side of bed?: A Little  How much help from another person moving to and from a bed to a chair?: A Little  How much help from another person needed to walk in hospital room?: A Little  How much help from another person for climbing 3-5 steps with a railing?: Total  AM-PAC Inpatient Mobility Raw Score : 16  AM-PAC Inpatient T-Scale Score : 40.78  Mobility Inpatient CMS 0-100% Score: 54.16  Mobility Inpatient CMS G-Code Modifier : CK    SUBJECTIVE:   Mr. Benito Martinez states, that he is agreeable to therapy    Social/Functional Lives With: Spouse  Type of Home: House  Home Layout: One level, Able to Live on Main level with bedroom/bathroom  Home Access: Stairs to enter without rails  Entrance Stairs - Number of Steps: 2  Ambulation Assistance: Independent (with cane for long distances)  Transfer Assistance: Independent    OBJECTIVE:     PAIN: VITAL SIGNS: LINES/DRAINS:   Pre Treatment:  Pain Assessment: 0-10  Pain Level: 3  Pain Location: Shoulder  Pain Orientation: Right  Non-Pharmaceutical Pain Intervention(s): Repositioned      Post Treatment: 3/10 Vitals NT       Oxygen NT    IV    RESTRICTIONS/PRECAUTIONS:  Restrictions/Precautions: Fall Risk                   HAND DOMINANCE:  Left []  Right [x]      UPPER EXTREMITY GROSS EVALUATION:  RIGHT UE   Within Functional Limits   Abnormal   Comments   Strength [] [x]  2/5 right elbow, wrist & hand grossly   Active Range of Motion [] [x]  NT shld, wrist & hand  decreased but WFL, elbow only 50% of there normal ROM   Passive Range of Motion           [] []        LEFT UE Within Functional Limits   Abnormal   Comments   Strength [x] []     Active Range of Motion [x] []     Passive Range of Motion [] []       LOWER EXTREMITY GROSS EVALUATION:     Within Functional Limits   Abnormal   Comments   Strength [] [x] Decreased but functional   Range of Motion [] [x] Decreased but functional       COGNITION/  PERCEPTION: Intact Impaired (Comments):   Orientation [x]     Vision [x]     Hearing [x]     Cognition  [x]       MOBILITY: I Mod I S SBA CGA Min Mod Max Total  NT x2 Comments:   Bed Mobility    Rolling [] [] [] [] [] [] [] [] [] [] []    Supine to Sit [] [] [] [] [] [] [] [] [] [] []    Scooting [] [] [] [] [x] [] [] [] [] [] []    Sit to Supine [] [] [] [] [] [] [] [] [] [] []    Transfers    Sit to Stand [] [] [] [x] [] [] [] [] [] [] []     Bed to Chair [] [] [] [x] [] [] [] [] [] [] []    Stand to Sit [] [] [] [x] [] [] [] [] [] [] []    Stand Pivot [] [] [] [x] [] [] [] [] [] [] []    Toilet [] [] [] [] [] [] [] [] [] [] []     [] [] [] [] [] [] [] [] [] [] []    I=Independent, Mod I=Modified Independent, S=Supervision, SBA=Standby Assistance, CGA=Contact Guard Assistance,   Min=Minimal Assistance, Mod=Moderate Assistance, Max=Maximal Assistance, Total=Total Assistance, NT=Not Tested    GAIT: I Mod I S SBA CGA Min Mod Max Total  NT x2 Comments:   Level of Assistance [] [] [] [x] [x] [] [] [] [] [] []    Weightbearing Status  Restrictions/Precautions  No LE WB restrictions    Distance  120     Gait Quality Decreased john , Decreased step clearance, Decreased step length, and mild sway    DME none      Stairs Stairs/Curb  Stairs?:  (NT)    I=Independent, Mod I=Modified Independent, S=Supervision, SBA=Standby Assistance, CGA=Contact Guard Assistance,   Min=Minimal Assistance, Mod=Moderate Assistance, Max=Maximal Assistance, Total=Total Assistance, NT=Not Tested    BALANCE: Good Fair+ Fair Fair- Poor NT Comments   Sitting Static [] [] [x] [] [] []    Sitting Dynamic [] [] [x] [] [] []              Standing Static [] [] [x] [x] [] []    Standing Dynamic [] [] [x] [x] [] []      PLAN:   ACUTE PHYSICAL THERAPY GOALS:   (Developed with and agreed upon by patient and/or caregiver.)  GOALS MODIFIED 717/22 :  (1.)Mr. Schultz will move from supine to sit and sit to supine  with CONTACT GUARD ASSIST.  (2.)Mr. Schultz will transfer from bed to chair and chair to bed with STAND BY ASSIST (consistently) using device PRN. .    (3.)Mr. Rudy Guaman will ambulate with STAND BY ASSIST (consistently) for 200 feet with device PRN. 4) pt able to go up & down 2 steps with minimal assist & device PRN. 5) pt safe with HEP Per MD Rx, with written guidelines & with spouses help.  ________________________________________________________________________________________________     FREQUENCY AND DURATION: BID for duration of hospital stay or until stated goals are met, whichever comes first.    THERAPY PROGNOSIS: Good    PROBLEM LIST:   (Skilled intervention is medically necessary to address:)  Decreased ADL/Functional Activities  Decreased Activity Tolerance  Decreased AROM/PROM  Decreased Balance  Decreased Coordination  Decreased Gait Ability  Decreased Safety Awareness  Decreased Strength  Decreased Transfer Abilities  Increased Pain   INTERVENTIONS PLANNED:   (Benefits and precautions of physical therapy have been discussed with the patient.)  Therapeutic Activity  Therapeutic Exercise/HEP  Neuromuscular Re-education  Gait Training  Manual Therapy  Education       TREATMENT:   EVALUATION: MODERATE COMPLEXITY: (Untimed Charge)    TREATMENT:   Therapeutic Activity (26 Minutes): Therapeutic activity included review HEP per Rx, scooting activity,sitting & standing balance activity, review PT expectations, review of safety & precautions, progressive gait training, education on right UE positioning to reduce edema  to improve functional Activity tolerance, Balance, Coordination, Mobility, Strength, and ROM.     TREATMENT GRID:   Date:  7/16 Date:  7/17 Date:     ACTIVITY/EXERCISE: AM PM AM PM AM PM   Gripping 25 25 25      Wrist Flexion/Extension 25 25 25      Wrist Ulnar/Radial Deviation         Pronation/Supination 25 25 25      Elbow Flexion/Extension 25aa 25aa 25aa      Shoulder Flexion/Extension         Shoulder AB/ADduction         Shoulder IR/ER         Pulleys Pendulums 25p CW & CCW 25p  CW & CCW 25p  CW & CCW      Shrugs         ISOMETRIC:                          Flexion         Extension         ABduction         ADduction         Biceps/Triceps         B = bilateral; AA = active assistive; A = active; P = passive      EDUCATION: Education Given To: Patient; Family;Caregiver  Education Provided: Plan of Care;Home Exercise Program;Precautions;Transfer Training;IADL Safety  Education Method: Demonstration;Verbal;Teach Back;Printed Information/Hand-outs  Education Outcome: Verbalized understanding;Demonstrated understanding;Continued education needed  EDUCATION:  [x]  Home Exercises  [x]  Sling Application [x]  Movement Precautions   []  Pulleys [x]  Use of Ice  []  Other:      AFTER TREATMENT PRECAUTIONS: Bed/Chair Locked, Call light within reach, Chair, Needs within reach, RN notified, and Visitors at bedside    INTERDISCIPLINARY COLLABORATION:  RN/ PCT    COMPLIANCE WITH PROGRAM/EXERCISES: Will assess as treatment progresses. RECOMMENDATIONS/INTENT FOR NEXT TREATMENT SESSION: Treatment next visit will focus on increasing Mr. Duke Delgado independence with bed mobility, transfers, gait training, strength/ROM exercises, modalities for pain, and patient education. TIME IN/OUT:  Time In: 2644  Time Out: 0809  Minutes: [address] [facility] [address]

## 2022-07-17 NOTE — FLOWSHEET NOTE
Patient hypotensive but asymptomatic.   Primary, ortho notified        07/17/22 1541   Treatment Team Notification   Reason for Communication Abnormal vitals   Team Member Name Carrie Zaragoza NP   Treatment Team Role Advanced Practice Nurse   Method of Communication Secure Message   Response Waiting for response   Notification Time [phone]

## 2022-07-17 NOTE — PROGRESS NOTES
Patient resting quietly, alert and oriented, no distress noted. Right shoulder dressing c/d/i, voiding clear yellow urine, ambulating well in hallway. Neurovascular and peripheral vascular checks WNL. Bed low and locked position. Call light within reach. Patient instructed to call for assistance, verbalizes understanding. Nursing assessment complete.

## 2022-07-17 NOTE — PROGRESS NOTES
Hospitalist Progress Note   Admit Date:  7/15/2022 10:02 AM   Name:  Neel Key   Age:  80 y.o. Sex:  male  :  1950   MRN:  [id number]   Room:  [facility]/    Presenting Complaint: No chief complaint on file. Reason(s) for Admission: Closed 4-part fracture of surgical neck of right humerus, initial encounter Piter Oliva  Other closed fracture of shaft of right humerus, initial encounter [S42.391A]  Degenerative joint disease of right acromioclavicular joint [M19.011]     Hospital Course & Interval History: We are consulted for post operative medical management of 71M PMHx paroxysmal atrial fibrillation, history of DVT, heart failure with diastolic dysfunction who was admitted for close four-part fracture of surgical neck of humerus now status post reverse right total shoulder arthroplasty. His medication list was accurate and up-to-date. He understood that his metoprolol was being held. He had episodes of constipation while on opioids in the past.  He was encouraged to use opioids appropriately to keep control of his pain. He was encouraged to notify staff if he needed help with bowel movements. His pain was controlled initially. He reported no further complaints. Subjective/24hr Events (22):   Pain not controlled. Poor quality sleep. Working with PT, OT. Hgb borderline.       Assessment & Plan:   Closed 4-part fracture of surgical neck of right humerus  Osteoarthritis of right shoulder  Degenerative joint disease of right acromioclavicular joint  -Management per primary orthopedic team  -Ancef  -Dilaudid po  2022: required single dose dilaudid IV after PT     Gastroesophageal reflux (new onset)  -pantoprazole IV bid x 4 doses    Obstructive sleep apnea  -BiPAP at night  2022: add melatonin     Paroxysmal atrial fibrillation  -Rivaroxaban     Primary hypertension  -Lisinopril-HCTZ     Diastolic dysfunction  -Hold metoprolol     BPH  -Tamsulosin      Diet:  ADULT DIET; Regular  DVT PPx: on rivaroxaban  Code status: Full Code    Hospital Problems:  Principal Problem:    Closed 4-part fracture of surgical neck of right humerus  Active Problems:    Closed 4-part fracture of surgical neck of right humerus, initial encounter    Osteoarthritis of right shoulder    FORTINO (obstructive sleep apnea)    Paroxysmal atrial fibrillation (HCC)    Primary hypertension    Diastolic dysfunction    Closed fracture of shaft of right humerus    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 *      Objective:   Patient Vitals for the past 24 hrs:   Temp Pulse Resp BP SpO2   07/17/22 0812 -- -- 16 -- --   07/17/22 0727 98.6 °F (37 °C) 84 18 115/64 91 %   07/17/22 0430 97.8 °F (36.6 °C) 78 16 119/65 96 %   07/17/22 0020 98.7 °F (37.1 °C) 72 16 119/62 92 %   07/16/22 2000 98.1 °F (36.7 °C) 81 16 102/68 98 %   07/16/22 1833 -- -- 16 -- --   07/16/22 1549 -- -- 18 -- --   07/16/22 1543 99 °F (37.2 °C) 82 28 (!) 127/97 94 %   07/16/22 1519 -- -- 16 -- --   07/16/22 1403 -- -- 18 -- --   07/16/22 1333 -- -- 16 -- --   07/16/22 1144 99.1 °F (37.3 °C) 84 18 135/66 98 %   07/16/22 1029 -- -- 16 -- --   07/16/22 0958 -- -- 16 -- --         Oxygen Therapy  SpO2: 91 %  Pulse via Oximetry: 63 beats per minute  Pulse Oximeter Device Mode: Intermittent  O2 Device: None (Room air)  O2 Flow Rate (L/min): 0 L/min    Estimated body mass index is 35.15 kg/m² as calculated from the following:    Height as of 7/13/22: 5' 10\" (1.778 m). Weight as of this encounter: 245 lb (111.1 kg). Intake/Output Summary (Last 24 hours) at 7/17/2022 0838  Last data filed at 7/16/2022 1722  Gross per 24 hour   Intake --   Output 1500 ml   Net -1500 ml         Blood pressure 115/64, pulse 84, temperature 98.6 °F (37 °C), temperature source Oral, resp. rate 16, weight 245 lb (111.1 kg), SpO2 91 %. Physical Exam  Vitals and nursing note reviewed.    Constitutional: General: He is not in acute distress. Appearance: He is obese. He is ill-appearing. He is not diaphoretic. HENT:      Head: Normocephalic and atraumatic. Eyes:      Extraocular Movements: Extraocular movements intact. Cardiovascular:      Rate and Rhythm: Normal rate. Pulmonary:      Effort: Pulmonary effort is normal. No respiratory distress. Abdominal:      General: There is no distension. Musculoskeletal:         General: No deformity. Comments: R shoulder in surgical bandage   Skin:     Coloration: Skin is not jaundiced or pale. Comments: Bruising without erythema extending beyond surgical bandage of R shoulder   Neurological:      General: No focal deficit present. Mental Status: He is alert and oriented to person, place, and time. Psychiatric:         Mood and Affect: Mood is depressed. Behavior: Behavior normal. Behavior is cooperative.          Cognition and Memory: Cognition normal.         I have reviewed ordered lab tests and independently visualized imaging below:    Recent Labs:  Recent Results (from the past 48 hour(s))   Basic Metabolic Panel    Collection Time: 07/15/22  2:04 PM   Result Value Ref Range    Sodium 136 136 - 145 mmol/L    Potassium 3.7 3.5 - 5.1 mmol/L    Chloride 104 98 - 107 mmol/L    CO2 27 21 - 32 mmol/L    Anion Gap 5 (L) 7 - 16 mmol/L    Glucose 144 (H) 65 - 100 mg/dL    BUN 20 8 - 23 MG/DL    CREATININE 0.93 0.8 - 1.5 MG/DL    GFR African American >60 >60 ml/min/1.73m2    GFR Non- >60 >60 ml/min/1.73m2    Calcium 8.5 8.3 - 10.4 MG/DL   CBC with Auto Differential    Collection Time: 07/15/22  2:04 PM   Result Value Ref Range    WBC 10.0 4.3 - 11.1 K/uL    RBC 2.83 (L) 4.23 - 5.6 M/uL    Hemoglobin 9.0 (L) 13.6 - 17.2 g/dL    Hematocrit 26.8 (L) 41.1 - 50.3 %    MCV 94.7 79.6 - 97.8 FL    MCH 31.8 26.1 - 32.9 PG    MCHC 33.6 31.4 - 35.0 g/dL    RDW 13.5 11.9 - 14.6 %    Platelets 471 429 - 742 K/uL    MPV 9.5 9.4 - 12.3 FL nRBC 0.00 0.0 - 0.2 K/uL    Differential Type AUTOMATED      Seg Neutrophils 80 (H) 43 - 78 %    Lymphocytes 11 (L) 13 - 44 %    Monocytes 6 4.0 - 12.0 %    Eosinophils % 1 0.5 - 7.8 %    Basophils 1 0.0 - 2.0 %    Immature Granulocytes 1 0.0 - 5.0 %    Segs Absolute 8.0 1.7 - 8.2 K/UL    Absolute Lymph # 1.1 0.5 - 4.6 K/UL    Absolute Mono # 0.6 0.1 - 1.3 K/UL    Absolute Eos # 0.1 0.0 - 0.8 K/UL    Basophils Absolute 0.1 0.0 - 0.2 K/UL    Absolute Immature Granulocyte 0.1 0.0 - 0.5 K/UL   Magnesium    Collection Time: 07/15/22  2:04 PM   Result Value Ref Range    Magnesium 2.2 1.8 - 2.4 mg/dL   Basic Metabolic Panel    Collection Time: 07/16/22  3:48 AM   Result Value Ref Range    Sodium 135 (L) 136 - 145 mmol/L    Potassium 3.5 3.5 - 5.1 mmol/L    Chloride 102 98 - 107 mmol/L    CO2 26 21 - 32 mmol/L    Anion Gap 7 7 - 16 mmol/L    Glucose 136 (H) 65 - 100 mg/dL    BUN 17 8 - 23 MG/DL    CREATININE 0.83 0.8 - 1.5 MG/DL    GFR African American >60 >60 ml/min/1.73m2    GFR Non- >60 >60 ml/min/1.73m2    Calcium 8.8 8.3 - 10.4 MG/DL   Magnesium    Collection Time: 07/16/22  3:48 AM   Result Value Ref Range    Magnesium 2.1 1.8 - 2.4 mg/dL   CBC    Collection Time: 07/16/22  3:48 AM   Result Value Ref Range    WBC 11.1 4.3 - 11.1 K/uL    RBC 2.75 (L) 4.23 - 5.6 M/uL    Hemoglobin 8.5 (L) 13.6 - 17.2 g/dL    Hematocrit 25.7 (L) 41.1 - 50.3 %    MCV 93.5 79.6 - 97.8 FL    MCH 30.9 26.1 - 32.9 PG    MCHC 33.1 31.4 - 35.0 g/dL    RDW 13.2 11.9 - 14.6 %    Platelets 253 489 - 143 K/uL    MPV 9.3 (L) 9.4 - 12.3 FL    nRBC 0.00 0.0 - 0.2 K/uL   Basic Metabolic Panel    Collection Time: 07/17/22  4:36 AM   Result Value Ref Range    Sodium 133 (L) 136 - 145 mmol/L    Potassium 3.6 3.5 - 5.1 mmol/L    Chloride 99 98 - 107 mmol/L    CO2 28 21 - 32 mmol/L    Anion Gap 6 (L) 7 - 16 mmol/L    Glucose 130 (H) 65 - 100 mg/dL    BUN 15 8 - 23 MG/DL    CREATININE 0.71 (L) 0.8 - 1.5 MG/DL    GFR  >60 >60 ml/min/1.73m2    GFR Non-African American >60 >60 ml/min/1.73m2    Calcium 8.6 8.3 - 10.4 MG/DL   Magnesium    Collection Time: 07/17/22  4:36 AM   Result Value Ref Range    Magnesium 2.1 1.8 - 2.4 mg/dL   CBC    Collection Time: 07/17/22  4:36 AM   Result Value Ref Range    WBC 8.1 4.3 - 11.1 K/uL    RBC 2.38 (L) 4.23 - 5.6 M/uL    Hemoglobin 7.4 (L) 13.6 - 17.2 g/dL    Hematocrit 22.0 (L) 41.1 - 50.3 %    MCV 92.4 79.6 - 97.8 FL    MCH 31.1 26.1 - 32.9 PG    MCHC 33.6 31.4 - 35.0 g/dL    RDW 13.9 11.9 - 14.6 %    Platelets 579 870 - 407 K/uL    MPV 9.3 (L) 9.4 - 12.3 FL    nRBC 0.00 0.0 - 0.2 K/uL       Other Studies:  XR SHOULDER RIGHT (MIN 2 VIEWS)   Final Result   1. Immediate postsurgical change of reverse right total shoulder arthroplasty   without evidence of acute postoperative complication.                    Current Meds:  Current Facility-Administered Medications   Medication Dose Route Frequency    aluminum & magnesium hydroxide-simethicone (MAALOX) 200-200-20 MG/5ML suspension 30 mL  30 mL Oral Q6H PRN    pantoprazole (PROTONIX) 40 mg in sodium chloride (PF) 10 mL injection  40 mg IntraVENous Q12H    tuberculin injection 5 Units  5 Units IntraDERmal Once    docusate sodium (COLACE) capsule 100 mg  100 mg Oral BID    HYDROmorphone (DILAUDID) tablet 2 mg  2 mg Oral Q3H PRN    HYDROmorphone (DILAUDID) tablet 4 mg  4 mg Oral Q3H PRN    HYDROmorphone HCl PF (DILAUDID) injection 1 mg  1 mg IntraVENous Q1H PRN    bisacodyl (DULCOLAX) suppository 10 mg  10 mg Rectal Daily PRN    ondansetron (ZOFRAN-ODT) disintegrating tablet 4 mg  4 mg Oral Q8H PRN    promethazine (PHENERGAN) tablet 25 mg  25 mg Oral Q4H PRN    ferrous sulfate (IRON 325) tablet 325 mg  325 mg Oral BID WC    sodium chloride flush 0.9 % injection 5-40 mL  5-40 mL IntraVENous 2 times per day    sodium chloride flush 0.9 % injection 5-40 mL  5-40 mL IntraVENous PRN    0.9 % sodium chloride infusion   IntraVENous PRN    fenofibrate (TRIGLIDE) tablet 160 mg  160 mg Oral Daily    lisinopril-hydroCHLOROthiazide (PRINZIDE;ZESTORETIC) 20-25 MG per tablet 1 tablet  1 tablet Oral Daily    rivaroxaban (XARELTO) tablet 20 mg  20 mg Oral Dinner    tamsulosin (FLOMAX) capsule 0.4 mg  0.4 mg Oral Daily    [Held by provider] metoprolol succinate (TOPROL XL) extended release tablet 50 mg  50 mg Oral Daily       Signed:  Juan Hubbard MD

## 2022-07-18 VITALS
WEIGHT: 245 LBS | SYSTOLIC BLOOD PRESSURE: 99 MMHG | RESPIRATION RATE: 18 BRPM | OXYGEN SATURATION: 94 % | DIASTOLIC BLOOD PRESSURE: 55 MMHG | TEMPERATURE: 97.8 F | HEART RATE: 102 BPM | BODY MASS INDEX: 35.15 KG/M2

## 2022-07-18 PROBLEM — N39.0 URINARY TRACT INFECTION: Status: ACTIVE | Noted: 2022-07-18

## 2022-07-18 PROBLEM — D62 POSTOPERATIVE ANEMIA DUE TO ACUTE BLOOD LOSS: Status: ACTIVE | Noted: 2022-07-18

## 2022-07-18 LAB
ANION GAP SERPL CALC-SCNC: 7 MMOL/L (ref 7–16)
BUN SERPL-MCNC: 18 MG/DL (ref 8–23)
CALCIUM SERPL-MCNC: 9.1 MG/DL (ref 8.3–10.4)
CHLORIDE SERPL-SCNC: 100 MMOL/L (ref 98–107)
CO2 SERPL-SCNC: 28 MMOL/L (ref 21–32)
CREAT SERPL-MCNC: 0.83 MG/DL (ref 0.8–1.5)
ERYTHROCYTE [DISTWIDTH] IN BLOOD BY AUTOMATED COUNT: 14.3 % (ref 11.9–14.6)
GLUCOSE SERPL-MCNC: 129 MG/DL (ref 65–100)
HCT VFR BLD AUTO: 24.2 % (ref 41.1–50.3)
HGB BLD-MCNC: 8.1 G/DL (ref 13.6–17.2)
MAGNESIUM SERPL-MCNC: 2.2 MG/DL (ref 1.8–2.4)
MCH RBC QN AUTO: 31.2 PG (ref 26.1–32.9)
MCHC RBC AUTO-ENTMCNC: 33.5 G/DL (ref 31.4–35)
MCV RBC AUTO: 93.1 FL (ref 79.6–97.8)
NRBC # BLD: 0 K/UL (ref 0–0.2)
PLATELET # BLD AUTO: 240 K/UL (ref 150–450)
PMV BLD AUTO: 9.4 FL (ref 9.4–12.3)
POTASSIUM SERPL-SCNC: 3.7 MMOL/L (ref 3.5–5.1)
RBC # BLD AUTO: 2.6 M/UL (ref 4.23–5.6)
SODIUM SERPL-SCNC: 135 MMOL/L (ref 136–145)
WBC # BLD AUTO: 7.4 K/UL (ref 4.3–11.1)

## 2022-07-18 PROCEDURE — G0378 HOSPITAL OBSERVATION PER HR: HCPCS

## 2022-07-18 PROCEDURE — 80048 BASIC METABOLIC PNL TOTAL CA: CPT

## 2022-07-18 PROCEDURE — 97530 THERAPEUTIC ACTIVITIES: CPT

## 2022-07-18 PROCEDURE — 6370000000 HC RX 637 (ALT 250 FOR IP): Performed by: ORTHOPAEDIC SURGERY

## 2022-07-18 PROCEDURE — 36415 COLL VENOUS BLD VENIPUNCTURE: CPT

## 2022-07-18 PROCEDURE — 85027 COMPLETE CBC AUTOMATED: CPT

## 2022-07-18 PROCEDURE — 2580000003 HC RX 258: Performed by: ORTHOPAEDIC SURGERY

## 2022-07-18 PROCEDURE — 83735 ASSAY OF MAGNESIUM: CPT

## 2022-07-18 RX ORDER — LEVOFLOXACIN 500 MG/1
500 TABLET, FILM COATED ORAL DAILY
Qty: 7 TABLET | Refills: 0 | Status: SHIPPED | OUTPATIENT
Start: 2022-07-18 | End: 2022-07-25

## 2022-07-18 RX ORDER — GABAPENTIN 100 MG/1
100 CAPSULE ORAL 3 TIMES DAILY
Qty: 90 CAPSULE | Refills: 0 | Status: SHIPPED | OUTPATIENT
Start: 2022-07-18 | End: 2022-08-17

## 2022-07-18 RX ORDER — PROMETHAZINE HYDROCHLORIDE 25 MG/1
25 TABLET ORAL EVERY 6 HOURS PRN
Qty: 20 TABLET | Refills: 0 | Status: SHIPPED | OUTPATIENT
Start: 2022-07-18 | End: 2022-07-23

## 2022-07-18 RX ORDER — POLYETHYLENE GLYCOL 3350 17 G/17G
17 POWDER, FOR SOLUTION ORAL DAILY
Status: DISCONTINUED | OUTPATIENT
Start: 2022-07-18 | End: 2022-07-18 | Stop reason: HOSPADM

## 2022-07-18 RX ORDER — HYDROMORPHONE HYDROCHLORIDE 2 MG/1
2 TABLET ORAL EVERY 6 HOURS PRN
Qty: 40 TABLET | Refills: 0 | Status: SHIPPED | OUTPATIENT
Start: 2022-07-18 | End: 2022-07-28

## 2022-07-18 RX ADMIN — LISINOPRIL AND HYDROCHLOROTHIAZIDE 1 TABLET: 25; 20 TABLET ORAL at 08:22

## 2022-07-18 RX ADMIN — MAGNESIUM CITRATE 296 ML: 1.75 LIQUID ORAL at 08:22

## 2022-07-18 RX ADMIN — POLYETHYLENE GLYCOL 3350 17 G: 17 POWDER, FOR SOLUTION ORAL at 08:23

## 2022-07-18 RX ADMIN — FERROUS SULFATE TAB 325 MG (65 MG ELEMENTAL FE) 325 MG: 325 (65 FE) TAB at 08:23

## 2022-07-18 RX ADMIN — TAMSULOSIN HYDROCHLORIDE 0.4 MG: 0.4 CAPSULE ORAL at 08:23

## 2022-07-18 RX ADMIN — FENOFIBRATE 160 MG: 160 TABLET ORAL at 08:23

## 2022-07-18 RX ADMIN — SODIUM CHLORIDE, PRESERVATIVE FREE 10 ML: 5 INJECTION INTRAVENOUS at 08:28

## 2022-07-18 RX ADMIN — HYDROMORPHONE HYDROCHLORIDE 4 MG: 4 TABLET ORAL at 08:22

## 2022-07-18 RX ADMIN — HYDROMORPHONE HYDROCHLORIDE 4 MG: 4 TABLET ORAL at 11:30

## 2022-07-18 RX ADMIN — DOCUSATE SODIUM 100 MG: 100 CAPSULE ORAL at 08:23

## 2022-07-18 ASSESSMENT — PAIN DESCRIPTION - FREQUENCY: FREQUENCY: INTERMITTENT

## 2022-07-18 ASSESSMENT — PAIN SCALES - GENERAL
PAINLEVEL_OUTOF10: 3
PAINLEVEL_OUTOF10: 3
PAINLEVEL_OUTOF10: 6
PAINLEVEL_OUTOF10: 3
PAINLEVEL_OUTOF10: 6
PAINLEVEL_OUTOF10: 4
PAINLEVEL_OUTOF10: 3

## 2022-07-18 ASSESSMENT — PAIN - FUNCTIONAL ASSESSMENT
PAIN_FUNCTIONAL_ASSESSMENT: ACTIVITIES ARE NOT PREVENTED
PAIN_FUNCTIONAL_ASSESSMENT: ACTIVITIES ARE NOT PREVENTED

## 2022-07-18 ASSESSMENT — PAIN DESCRIPTION - DESCRIPTORS
DESCRIPTORS: ACHING
DESCRIPTORS: ACHING

## 2022-07-18 ASSESSMENT — PAIN DESCRIPTION - PAIN TYPE: TYPE: ACUTE PAIN

## 2022-07-18 ASSESSMENT — PAIN DESCRIPTION - ORIENTATION
ORIENTATION: RIGHT

## 2022-07-18 ASSESSMENT — PAIN DESCRIPTION - LOCATION
LOCATION: SHOULDER

## 2022-07-18 ASSESSMENT — PAIN SCALES - WONG BAKER: WONGBAKER_NUMERICALRESPONSE: 0

## 2022-07-18 NOTE — PROGRESS NOTES
difficulty turning over in bed?: A Little  How much difficulty sitting down on / standing up from a chair with arms?: A Little  How much difficulty moving from lying on back to sitting on side of bed?: A Little  How much help from another person moving to and from a bed to a chair?: A Little  How much help from another person needed to walk in hospital room?: A Little  How much help from another person for climbing 3-5 steps with a railing?: Total  AM-PAC Inpatient Mobility Raw Score : 16  AM-PAC Inpatient T-Scale Score : 40.78  Mobility Inpatient CMS 0-100% Score: 54.16  Mobility Inpatient CMS G-Code Modifier : CK    SUBJECTIVE:   Mr. Jany Alarcon states, Pt was appreciative of therapy services    Social/Functional Lives With: Spouse  Type of Home: House  Home Layout: One level, Able to Live on Main level with bedroom/bathroom  Home Access: Stairs to enter without rails  Entrance Stairs - Number of Steps: 2  Ambulation Assistance: Independent (with cane for long distances)  Transfer Assistance: Independent    OBJECTIVE:     PAIN: VITAL SIGNS: LINES/DRAINS:   Pre Treatment:  Pain Assessment: 0-10  Pain Level: 3  Pain Location: Shoulder  Pain Orientation: Right  Non-Pharmaceutical Pain Intervention(s): Repositioned      Post Treatment: 3/10 Vitals NT       Oxygen NT    IV    RESTRICTIONS/PRECAUTIONS:  Restrictions/Precautions: Fall Risk                   HAND DOMINANCE:  Left []  Right [x]      UPPER EXTREMITY GROSS EVALUATION:  RIGHT UE   Within Functional Limits   Abnormal   Comments   Strength [] [x]  2/5 right elbow, wrist & hand grossly   Active Range of Motion [] [x]  NT shld, wrist & hand  decreased but WFL, elbow only 50% of there normal ROM   Passive Range of Motion           [] []        LEFT UE Within Functional Limits   Abnormal   Comments   Strength [x] []     Active Range of Motion [x] []     Passive Range of Motion [] []       LOWER EXTREMITY GROSS EVALUATION:     Within Functional Limits   Abnormal Comments   Strength [] [x] Decreased but functional   Range of Motion [] [x] Decreased but functional       COGNITION/  PERCEPTION: Intact Impaired (Comments):   Orientation [x]     Vision [x]     Hearing [x]     Cognition  [x]       MOBILITY: I Mod I S SBA CGA Min Mod Max Total  NT x2 Comments:   Bed Mobility    Rolling [] [] [] [] [] [] [] [] [] [] []    Supine to Sit [] [] [] [] [] [] [] [] [] [] []    Scooting [] [] [] [] [x] [] [] [] [] [] []    Sit to Supine [] [] [] [] [] [] [] [] [] [] []    Transfers    Sit to Stand [] [] [] [x] [] [] [] [] [] [] []     Bed to Chair [] [] [] [x] [] [] [] [] [] [] []    Stand to Sit [] [] [] [x] [] [] [] [] [] [] []    Stand Pivot [] [] [] [x] [] [] [] [] [] [] []    Toilet [] [] [] [] [] [] [] [] [] [] []     [] [] [] [] [] [] [] [] [] [] []    I=Independent, Mod I=Modified Independent, S=Supervision, SBA=Standby Assistance, CGA=Contact Guard Assistance,   Min=Minimal Assistance, Mod=Moderate Assistance, Max=Maximal Assistance, Total=Total Assistance, NT=Not Tested    GAIT: I Mod I S SBA CGA Min Mod Max Total  NT x2 Comments:   Level of Assistance [] [] [] [x] [] [] [] [] [] [] []    Weightbearing Status  Restrictions/Precautions  No LE WB restrictions    Distance  1st [id number] 2nd 75ft, 3rd 75ft     Gait Quality Decreased john , Decreased step clearance, Decreased step length, and mild sway    DME none      Stairs Stairs/Curb  Stairs?: Yes  Stairs  # Steps : 2  Rails: None  Assistance: Minimal assistance    I=Independent, Mod I=Modified Independent, S=Supervision, SBA=Standby Assistance, CGA=Contact Guard Assistance,   Min=Minimal Assistance, Mod=Moderate Assistance, Max=Maximal Assistance, Total=Total Assistance, NT=Not Tested    BALANCE: Good Fair+ Fair Fair- Poor NT Comments   Sitting Static [] [] [x] [] [] []    Sitting Dynamic [] [] [x] [] [] []              Standing Static [] [] [x] [x] [] []    Standing Dynamic [] [] [x] [x] [] []      PLAN:   ACUTE PHYSICAL Coordination, Mobility, Strength, and ROM. TREATMENT GRID:   Date:  7/16 Date:  7/17 Date:  7/18   ACTIVITY/EXERCISE: AM PM AM PM AM PM   Gripping 25 25 25 25 25    Wrist Flexion/Extension 25 25 25 25 25    Wrist Ulnar/Radial Deviation         Pronation/Supination 25 25 25 25 25    Elbow Flexion/Extension 25aa 25aa 25aa 25aa 25aa    Shoulder Flexion/Extension         Shoulder AB/ADduction         Shoulder IR/ER         Pulleys         Pendulums 25p CW & CCW 25p  CW & CCW 25p  CW & CCW 25p CW & CCW 25p CW & CCW    Shrugs         ISOMETRIC:                          Flexion         Extension         ABduction         ADduction         Biceps/Triceps         B = bilateral; AA = active assistive; A = active; P = passive      EDUCATION: Education Given To: Patient; Family;Caregiver  Education Provided: Plan of Care;Home Exercise Program;Precautions;Transfer Training;IADL Safety  Education Method: Demonstration;Verbal;Teach Back;Printed Information/Hand-outs  Education Outcome: Verbalized understanding;Demonstrated understanding  EDUCATION:  [x]  Home Exercises  [x]  Sling Application [x]  Movement Precautions   []  Pulleys [x]  Use of Ice  []  Other:      AFTER TREATMENT PRECAUTIONS: Bed/Chair Locked, Call light within reach, Chair, Needs within reach, RN notified, and Visitors at bedside    INTERDISCIPLINARY COLLABORATION:  RN/ PCT    COMPLIANCE WITH PROGRAM/EXERCISES: Will assess as treatment progresses. RECOMMENDATIONS/INTENT FOR NEXT TREATMENT SESSION: Treatment next visit will focus on increasing Mr. Rhoda Mario independence with bed mobility, transfers, gait training, strength/ROM exercises, modalities for pain, and patient education. TIME IN/OUT:  Time In: 1049  Time Out: Daniella Simsi  Minutes: [address]  [facility]

## 2022-07-18 NOTE — PROGRESS NOTES
Spoke with patient to see if he could recall what Dr. Michelle Ziegler had said with bedside visit. Unable to recall Magnesium Citrate orders. Spouse at bedside did mention Miralax, but unable to recall what was said.

## 2022-07-18 NOTE — PLAN OF CARE
Discharge instructions given patient verbalized understanding and signed form. Patient taken to car by staff in wheelchair.

## 2022-07-18 NOTE — PROGRESS NOTES
Orthopedic Joint Progress Note    2022  Admit Date: 7/15/2022  Admit Diagnosis: Closed 4-part fracture of surgical neck of right humerus, initial encounter [S42.241A]  Other closed fracture of shaft of right humerus, initial encounter [S42.391A]  Degenerative joint disease of right acromioclavicular joint [M19.011]    3 Days Post-Op    Subjective: feels better today no BM     Zelda Parkhill     Review of Systems: pertinent items are noted in HPI    Objective:     PT/OT:     PATIENT MOBILITY                           Vital Signs:    Blood pressure 113/67, pulse (!) 108, temperature 98.4 °F (36.9 °C), temperature source Axillary, resp. rate 18, weight 245 lb (111.1 kg), SpO2 100 %.   Temp (24hrs), Av.4 °F (36.9 °C), Min:98.1 °F (36.7 °C), Max:98.6 °F (37 °C)      Pain Control:        Meds:  Current Facility-Administered Medications   Medication Dose Route Frequency    aluminum & magnesium hydroxide-simethicone (MAALOX) 200-200-20 MG/5ML suspension 30 mL  30 mL Oral Q6H PRN    pantoprazole (PROTONIX) 40 mg in sodium chloride (PF) 10 mL injection  40 mg IntraVENous Q12H    docusate sodium (COLACE) capsule 100 mg  100 mg Oral BID    HYDROmorphone (DILAUDID) tablet 2 mg  2 mg Oral Q3H PRN    HYDROmorphone (DILAUDID) tablet 4 mg  4 mg Oral Q3H PRN    HYDROmorphone HCl PF (DILAUDID) injection 1 mg  1 mg IntraVENous Q1H PRN    bisacodyl (DULCOLAX) suppository 10 mg  10 mg Rectal Daily PRN    ondansetron (ZOFRAN-ODT) disintegrating tablet 4 mg  4 mg Oral Q8H PRN    promethazine (PHENERGAN) tablet 25 mg  25 mg Oral Q4H PRN    ferrous sulfate (IRON 325) tablet 325 mg  325 mg Oral BID WC    sodium chloride flush 0.9 % injection 5-40 mL  5-40 mL IntraVENous 2 times per day    sodium chloride flush 0.9 % injection 5-40 mL  5-40 mL IntraVENous PRN    0.9 % sodium chloride infusion   IntraVENous PRN    fenofibrate (TRIGLIDE) tablet 160 mg  160 mg Oral Daily    lisinopril-hydroCHLOROthiazide (PRINZIDE;ZESTORETIC) 20-25 MG per tablet 1 tablet  1 tablet Oral Daily    rivaroxaban (XARELTO) tablet 20 mg  20 mg Oral Dinner    tamsulosin (FLOMAX) capsule 0.4 mg  0.4 mg Oral Daily    [Held by provider] metoprolol succinate (TOPROL XL) extended release tablet 50 mg  50 mg Oral Daily        LAB:    Lab Results   Component Value Date/Time    INR 1.3 07/13/2022 10:57 AM     Lab Results   Component Value Date/Time    HGB 8.1 07/18/2022 03:56 AM    HGB 7.8 07/17/2022 03:00 PM    HGB 7.4 07/17/2022 04:36 AM              Physical Exam:  No significant changes    Assessment:      Principal Problem:    Closed 4-part fracture of surgical neck of right humerus  Active Problems:    Closed 4-part fracture of surgical neck of right humerus, initial encounter    Osteoarthritis of right shoulder    FORTINO (obstructive sleep apnea)    Paroxysmal atrial fibrillation (HCC)    Postoperative anemia due to acute blood loss    Primary hypertension    Diastolic dysfunction    Closed fracture of shaft of right humerus    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 *       Plan:     Continue PT/OT/Rehab  Bowel prep  Discharge home later today if stable         Gay Jacques MD    I have reviewed the patients controlled substance prescription history, as maintained in the [place] prescription monitoring program, so that the prescription(s) for a  controlled substance can be given.

## 2022-07-18 NOTE — CARE COORDINATION
Pt to discharge home with spouse and home health services. Referral initiated to [facility] on 7/16/22. CM confirmed receipt of referral and advised home health team of discharge. No additional discharge planning needs noted. 07/16/22 1025   Service Assessment   Patient Orientation Alert and Oriented   Support Systems Spouse/Significant Other   PCP Verified by CM Yes   Services At/After Discharge   Transition of Care Consult (CM Consult) [facility] Yes   Condition of Participation: Discharge Planning   The Plan for Transition of Care is related to the following treatment goals: Increase independence   The Patient and/or Patient Representative was provided with a Choice of Provider? Patient   Freedom of Choice list was provided with basic dialogue that supports the patient's individualized plan of care/goals, treatment preferences, and shares the quality data associated with the providers?   Yes

## 2022-07-18 NOTE — DISCHARGE INSTRUCTIONS
Resume pre hospital diet. Okay to shower. No driving until cleared by MD.  Use stool softeners/laxatives while taking narcotics. Continue exercises as taught by PT. Keep scheduled follow up appointment. Call office if temp>101, increased redness or drainage at incision site, or persistent nausea/vomitting. Shoulder Replacement Surgery: What to Expect at Home  Your Recovery     Shoulder replacement surgery replaces the worn parts of your shoulder joint. When you leave the hospital, your arm will be in a sling. It will be helpful if there is someone to help you at home for the next few weeks or until you havemore energy and can move around better. You will go home with a bandage and stitches, staples, skin glue, or tape strips. You can remove the bandage when your doctor tells you to. If you have staples, your doctor will remove them in 10 to 21 days. If you have stitches that are not the type that dissolve, your doctor will remove them in 10 to 14 days. Glue or tape strips will fall off on their own over time. You may still have some mild pain, and the area may be swollen for several months aftersurgery. Your doctor will give you medicine for the pain. A physical therapist will show you what exercises to do at home. You will continue the rehabilitation program (rehab) you started in the hospital. The better you do with your rehab exercises, the sooner you will get your strength and movement back. Depending on your job, you may be able to go back to work as early as 2 to 3 weeks after surgery, as long as you avoid certain arm movements, such as lifting. It takes at least 6 months to return tofull activity. In the future, make sure to let all health professionals know about yourartificial shoulder so they will know how to care for you. This care sheet gives you a general idea about how long it will take for you to recover. But each person recovers at a different pace.  Follow the steps belowto get better as quickly as possible. How can you care for yourself at home? Activity    Rest when you feel tired. You may take a nap, but don't stay in bed all day. Work with your physical therapist to learn the best way to exercise. You will have a sling to wear at night. And it's a good idea to also put a small stack of folded sheets or towels under your upper arm while you are in bed to keep your arm from dropping too far back. Your arm should stay next to your body or in front of it for several weeks, both while you are up and during sleep. Don't lift anything with the affected arm for 6 weeks. Ask your doctor when you can drive again. Ask your doctor when it is okay for you to have sex. Your doctor may advise you to give up activities that put stress on that shoulder. This includes sports such as weight lifting or tennis, unless your tennis arm was not the one affected. Diet    By the time you leave the hospital, you will probably be eating your normal diet. If your stomach is upset, try bland, low-fat foods like plain rice, broiled chicken, toast, and yogurt. Your doctor may recommend that you take iron and vitamin supplements. Drink plenty of fluids (unless your doctor tells you not to). You may notice that your bowel movements are not regular right after your surgery. This is common. Try to avoid constipation and straining with bowel movements. You may want to take a fiber supplement every day. If you have not had a bowel movement after a couple of days, ask your doctor about taking a mild laxative. Medicines    Your doctor will tell you if and when you can restart your medicines. You will also get instructions about taking any new medicines. If you take aspirin or some other blood thinner, ask your doctor if and when to start taking it again. Make sure that you understand exactly what your doctor wants you to do. Be safe with medicines.  Take pain medicines exactly work with your doctor and physical therapist to plan this exercise program. To get the best results, you need to do the exercises correctly and as often and as long as your doctor tells you. Ice    For pain, put ice or a cold pack on the area for 10 to 20 minutes at a time. Put a thin cloth between the ice and your skin. If your doctor recommended cold therapy using a portable machine, follow the instructions that came with the machine. Follow-up care is a key part of your treatment and safety. Be sure to make and go to all appointments, and call your doctor if you are having problems. It's also a good idea to know your test results and keep alist of the medicines you take. When should you call for help? Call 911 anytime you think you may need emergency care. For example, call if:    You have severe trouble breathing. You have symptoms of a blood clot in your lung (called a pulmonary embolism). These may include:  Sudden chest pain. Trouble breathing. Coughing up blood. Call your doctor now or seek immediate medical care if:    You have severe or increasing pain. You have symptoms of infection, such as: Increased pain, swelling, warmth, or redness. Red streaks or pus. A fever. You have tingling, weakness, or numbness in your arm. Your arm turns cold or changes color. You have symptoms of a blood clot in your leg (called a deep vein thrombosis). These may include:  Pain in the calf, back of the knee, thigh, or groin. Redness and swelling in the leg or groin. Watch closely for changes in your health, and be sure to contact your doctor if:    You do not get better as expected. Where can you learn more? Go to https://prudencio.[employer]. org and sign in to your [employer] account. Enter R581 in the Rollad box to learn more about \"Shoulder Replacement Surgery: What to Expect at Home. \"     If you do not have an account, please click on the \"Sign Up Now\" link.  Current as of: July 1, 2021               Content Version: 13.2  © 0782-3539 Healthwise, Incorporated. Care instructions adapted under license by [facility], [place] ([facility]). If you have questions about a medical condition or this instruction, always ask your healthcare professional. [address] any warranty or liability for your use of this information.

## 2022-07-19 ENCOUNTER — TELEPHONE (OUTPATIENT)
Dept: ORTHOPEDIC SURGERY | Age: 72
End: 2022-07-19

## 2022-07-19 ENCOUNTER — HOME CARE VISIT (OUTPATIENT)
Dept: SCHEDULING | Facility: HOME HEALTH | Age: 72
End: 2022-07-19
Payer: MEDICARE

## 2022-07-19 VITALS
OXYGEN SATURATION: 95 % | TEMPERATURE: 97.4 F | HEART RATE: 88 BPM | DIASTOLIC BLOOD PRESSURE: 62 MMHG | RESPIRATION RATE: 16 BRPM | SYSTOLIC BLOOD PRESSURE: 104 MMHG

## 2022-07-19 PROCEDURE — 400013 HH SOC

## 2022-07-19 PROCEDURE — G0151 HHCP-SERV OF PT,EA 15 MIN: HCPCS

## 2022-07-19 ASSESSMENT — ENCOUNTER SYMPTOMS
DYSPNEA ACTIVITY LEVEL: AFTER AMBULATING 10 - 20 FT
PAIN LOCATION - PAIN QUALITY: ACHES, SORE

## 2022-07-19 NOTE — TELEPHONE ENCOUNTER
Called and spoke to pt and per AGP, his cats can come into the room as long as they do not get near his surgery arm. Pt agreed and post op appt was scheduled.

## 2022-07-19 NOTE — Clinical Note
SITUATION. 80year old male patient s/p R reverse TSA 7/15/22 per Dr Radha Millan. Hospitalized 7/15/22 through 7/18/22. History of arthritis, afib, skin cancer, diffuse idiopathic skeletal hyperostosis, HTN, osteoporosis, PE, thromboembolus   BACKGROUND. .Lives with wife. Home is single level with several steps to enter. ASSESSMENT. Stanford Rivero Patient alert and oriented with good participation during visit. Pt presents with R reverse TSA with dressing in place and intact with no drainage saturating through and no signs or symptoms of infection. Treatment: Instructed patient in infection control, taking medication as directed, use and application of CP, and signs and symptoms of DVT.  Instructed reverse TSA HEP protocol, proper sling positioning and frequency sling must be worn and patient verbalized understanding  Frequency: 2w1, 3w1  Follow up: reverse TSA protocol; TWICE A DAY; elbow, hand, gentle pendulums right shoulder; NO OTHER MOTION; NWB RUE; sling R shoulder

## 2022-07-19 NOTE — Clinical Note
Medication review yielded potential major interaction between:    warfarin and levofloxacin  warfarin and celecoxib  celecoxib and rivaroxaban    Please advise if any changes are to be made.

## 2022-07-19 NOTE — HOME HEALTH
SITUATION. 80year old male patient s/p R reverse TSA 7/15/22 per Dr Stein Left. Hospitalized 7/15/22 through 7/18/22. History of arthritis, afib, skin cancer, diffuse idiopathic skeletal hyperostosis, HTN, osteoporosis, PE, thromboembolus   BACKGROUND. .Lives with wife. Home is single level with several steps to enter. ASSESSMENT. Citlali Galdamez Patient alert and oriented with good participation during visit. Pt presents with R reverse TSA with dressing in place and intact with no drainage saturating through and no signs or symptoms of infection. Treatment: Instructed patient in infection control, taking medication as directed, use and application of CP, and signs and symptoms of DVT.  Instructed reverse TSA HEP protocol, proper sling positioning and frequency sling must be worn and patient verbalized understanding  Frequency: 2w1, 3w1  Follow up: reverse TSA protocol; TWICE A DAY; elbow, hand, gentle pendulums right shoulder; NO OTHER MOTION; NWB RUE; sling R shoulder

## 2022-07-19 NOTE — TELEPHONE ENCOUNTER
Please  call pt   with  his  POST  OP     Also-  can  he  be  around  their  cats? Is  there any  time frame after  surgery  for  not getting  near  animals?     Please  call

## 2022-07-20 ENCOUNTER — TELEPHONE (OUTPATIENT)
Dept: ORTHOPEDIC SURGERY | Age: 72
End: 2022-07-20

## 2022-07-20 NOTE — TELEPHONE ENCOUNTER
Called and spoke to pt who asked if she should continue to take glucosamine and chondroitin. Per AGP, this is fine and I informed pt of this. He agreed.

## 2022-07-22 ENCOUNTER — HOME CARE VISIT (OUTPATIENT)
Dept: SCHEDULING | Facility: HOME HEALTH | Age: 72
End: 2022-07-22
Payer: MEDICARE

## 2022-07-22 VITALS
RESPIRATION RATE: 17 BRPM | OXYGEN SATURATION: 99 % | TEMPERATURE: 97.7 F | DIASTOLIC BLOOD PRESSURE: 70 MMHG | HEART RATE: 86 BPM | SYSTOLIC BLOOD PRESSURE: 110 MMHG

## 2022-07-22 PROCEDURE — G0157 HHC PT ASSISTANT EA 15: HCPCS

## 2022-07-22 ASSESSMENT — ENCOUNTER SYMPTOMS: PAIN LOCATION - PAIN QUALITY: ACHING, THROBBING

## 2022-07-22 NOTE — DISCHARGE SUMMARY
[facility] SUMMARY    Name:  Jas Ybarra  MR#:  [id number]  :  1950  ACCOUNT #:  [de-identified]  ADMIT DATE:  07/15/2022  DISCHARGE DATE:  2022    ADMISSION DIAGNOSES:  1. Four-part right proximal humerus fracture. 2.  Right humeral shaft fracture. 3.  Glenohumeral osteoarthritis, right shoulder. 4.  Postoperative anemia. 5.  Urinary tract infection. 6.  Hypercoagulable state, on Xarelto for pulmonary embolism/deep venous thrombosis. POSTOPERATIVE DIAGNOSES:  1. Four-part right proximal humerus fracture. 2.  Right humeral shaft fracture. 3.  Glenohumeral osteoarthritis, right shoulder. 4.  Postoperative anemia. 5.  Urinary tract infection. 6.  Hypercoagulable state, on Xarelto for pulmonary embolism/deep venous thrombosis. Please see H and P, operative summary and consult for details. HOSPITAL COURSE:  The patient is a 67-year-old gentleman who was admitted on 07/15/2022, underwent an uncomplicated reverse right total shoulder arthroplasty with a Delta Xtend prosthesis, biceps tenodesis. Immediately, perioperatively, hospitalist consult was obtained. Xarelto was restarted. On postoperative day #1, hemoglobin was 8.5, potassium and magnesium were within normal limits. The patient was in pain. His Chang catheter was removed. On postoperative day #2, he still remained in pain. His hemoglobin went down to 7.4. Repeat hemoglobin that day was 7.8. His pain was poorly controlled. He required IV narcotic pain medication. He had not had a bowel movement. On postoperative day #3, his pain was better controlled. His hemoglobin was increased to 8.1. He was discharged home on postoperative day #3. He was given Levaquin for UTI. He will follow up in my office in 1 week. Please make a note that hospitalist consult was obtained during the course of his stay to manage his multiple medical issues and he was also restarted back on his Xarelto.       MAURA ULLOA Susan Curry MD      [id number]  D:  07/18/2022 16:31  T:  07/19/2022 12:52  JOB #:  [id number]  CC:   Genoveva Hatchet, MD

## 2022-07-26 ENCOUNTER — HOME CARE VISIT (OUTPATIENT)
Dept: SCHEDULING | Facility: HOME HEALTH | Age: 72
End: 2022-07-26
Payer: MEDICARE

## 2022-07-26 VITALS
DIASTOLIC BLOOD PRESSURE: 64 MMHG | SYSTOLIC BLOOD PRESSURE: 116 MMHG | TEMPERATURE: 97.5 F | RESPIRATION RATE: 17 BRPM | OXYGEN SATURATION: 95 % | HEART RATE: 70 BPM

## 2022-07-26 PROCEDURE — G0157 HHC PT ASSISTANT EA 15: HCPCS

## 2022-07-27 ENCOUNTER — OFFICE VISIT (OUTPATIENT)
Dept: ORTHOPEDIC SURGERY | Age: 72
End: 2022-07-27

## 2022-07-27 ENCOUNTER — HOME CARE VISIT (OUTPATIENT)
Dept: HOME HEALTH SERVICES | Facility: HOME HEALTH | Age: 72
End: 2022-07-27
Payer: MEDICARE

## 2022-07-27 ENCOUNTER — HOME CARE VISIT (OUTPATIENT)
Dept: SCHEDULING | Facility: HOME HEALTH | Age: 72
End: 2022-07-27
Payer: MEDICARE

## 2022-07-27 ENCOUNTER — HOSPITAL ENCOUNTER (OUTPATIENT)
Dept: ULTRASOUND IMAGING | Age: 72
Discharge: HOME | End: 2022-07-30

## 2022-07-27 VITALS
TEMPERATURE: 97.7 F | RESPIRATION RATE: 17 BRPM | HEART RATE: 66 BPM | OXYGEN SATURATION: 96 % | DIASTOLIC BLOOD PRESSURE: 62 MMHG | SYSTOLIC BLOOD PRESSURE: 116 MMHG

## 2022-07-27 DIAGNOSIS — Z47.1 AFTERCARE FOLLOWING RIGHT SHOULDER JOINT REPLACEMENT SURGERY: ICD-10-CM

## 2022-07-27 DIAGNOSIS — Z96.611 AFTERCARE FOLLOWING RIGHT SHOULDER JOINT REPLACEMENT SURGERY: ICD-10-CM

## 2022-07-27 DIAGNOSIS — S42.241A CLOSED 4-PART FRACTURE OF SURGICAL NECK OF RIGHT HUMERUS, INITIAL ENCOUNTER: ICD-10-CM

## 2022-07-27 DIAGNOSIS — M79.89 CALF SWELLING: ICD-10-CM

## 2022-07-27 DIAGNOSIS — Z96.611 PRESENCE OF RIGHT ARTIFICIAL SHOULDER JOINT: Primary | ICD-10-CM

## 2022-07-27 LAB
ANION GAP SERPL CALC-SCNC: 4 MMOL/L (ref 7–16)
BASOPHILS # BLD: 0.1 K/UL (ref 0–0.2)
BASOPHILS NFR BLD: 2 % (ref 0–2)
BUN SERPL-MCNC: 14 MG/DL (ref 8–23)
CALCIUM SERPL-MCNC: 8.8 MG/DL (ref 8.3–10.4)
CHLORIDE SERPL-SCNC: 106 MMOL/L (ref 98–107)
CO2 SERPL-SCNC: 29 MMOL/L (ref 21–32)
CREAT SERPL-MCNC: 0.9 MG/DL (ref 0.8–1.5)
DIFFERENTIAL METHOD BLD: ABNORMAL
EOSINOPHIL # BLD: 0.1 K/UL (ref 0–0.8)
EOSINOPHIL NFR BLD: 3 % (ref 0.5–7.8)
ERYTHROCYTE [DISTWIDTH] IN BLOOD BY AUTOMATED COUNT: 14.6 % (ref 11.9–14.6)
GLUCOSE SERPL-MCNC: 105 MG/DL (ref 65–100)
HCT VFR BLD AUTO: 28.5 % (ref 41.1–50.3)
HGB BLD-MCNC: 8.8 G/DL (ref 13.6–17.2)
IMM GRANULOCYTES # BLD AUTO: 0 K/UL (ref 0–0.5)
IMM GRANULOCYTES NFR BLD AUTO: 1 % (ref 0–5)
LYMPHOCYTES # BLD: 1.2 K/UL (ref 0.5–4.6)
LYMPHOCYTES NFR BLD: 26 % (ref 13–44)
MAGNESIUM SERPL-MCNC: 2.4 MG/DL (ref 1.8–2.4)
MCH RBC QN AUTO: 30.9 PG (ref 26.1–32.9)
MCHC RBC AUTO-ENTMCNC: 30.9 G/DL (ref 31.4–35)
MCV RBC AUTO: 100 FL (ref 79.6–97.8)
MONOCYTES # BLD: 0.5 K/UL (ref 0.1–1.3)
MONOCYTES NFR BLD: 11 % (ref 4–12)
NEUTS SEG # BLD: 2.5 K/UL (ref 1.7–8.2)
NEUTS SEG NFR BLD: 57 % (ref 43–78)
NRBC # BLD: 0 K/UL (ref 0–0.2)
PLATELET # BLD AUTO: 337 K/UL (ref 150–450)
PMV BLD AUTO: 9 FL (ref 9.4–12.3)
POTASSIUM SERPL-SCNC: 4.2 MMOL/L (ref 3.5–5.1)
RBC # BLD AUTO: 2.85 M/UL (ref 4.23–5.6)
SODIUM SERPL-SCNC: 139 MMOL/L (ref 138–145)
WBC # BLD AUTO: 4.5 K/UL (ref 4.3–11.1)

## 2022-07-27 PROCEDURE — 99024 POSTOP FOLLOW-UP VISIT: CPT | Performed by: ORTHOPAEDIC SURGERY

## 2022-07-27 PROCEDURE — G0157 HHC PT ASSISTANT EA 15: HCPCS

## 2022-07-27 RX ORDER — HYDROMORPHONE HYDROCHLORIDE 2 MG/1
2 TABLET ORAL EVERY 6 HOURS PRN
Qty: 40 TABLET | Refills: 0 | Status: SHIPPED | OUTPATIENT
Start: 2022-07-27 | End: 2022-08-06

## 2022-07-27 ASSESSMENT — ENCOUNTER SYMPTOMS: PAIN LOCATION - PAIN QUALITY: SORE

## 2022-07-27 NOTE — PROGRESS NOTES
Name: Bjorn Larios  YOB: 1950  Gender: male  MRN: [id number]          HPI: Bjorn Larios is a 80 y.o. right-hand-dominant male 2 weeks status post reverse right total shoulder arthroplasty with a delta xtend prosthesis biceps tenodesis for a four-part right proximal humerus fracture with extension into the humeral shaft as well as glenohumeral osteoarthritis right shoulder. He returns noting that he is actually doing pretty well. He is complaining of left lower extremity swelling. He has a history of PEs and DVTs in the left lower extremity and is already on Xarelto. He used to wear RAMONA stockings. He is complaining of some discomfort. He is complaining of left lower extremity swelling. He is here for follow-up exam    ROS/Meds/PSH/PMH/FH/SH: A ten system review of systems was performed and is negative other than what is in the HPI. Tobacco:  reports that he quit smoking about 13 years ago. He has a 10.00 pack-year smoking history. He has never used smokeless tobacco.  There were no vitals taken for this visit. Physical Examination:  He is an awake alert pleasant gentleman ambulating without difficulty    The left shoulder has 0 to 180 degrees of active and 0 to 180 degrees passive forward elevation. Internal rotation is to T6. External rotation is to 60 degrees at the side. In the 90 degree abducted position 90 degrees of external and 90 degrees internal rotation  The AC joint is non-tender  SC joint is non-tender. Greater tuberosity is non-tender. negative biceps  Negative O'Briens sign  negative lift-off sign  Negative belly press sign  Negative bear huggers sign  negative drop sign  negative hornblower's sign  No posterior glenohumeral joint line tenderness.    No evident excessive external rotation  Rotator cuff strength is 5/5.  negative external rotation stress test.   Negative empty can sign  There is no evident anterior or posterior apprehension with a negative sulcus sign.   No instability  negative external and internal Rotation lag sign  Neurovascularly intact. The right shoulder deltopectoral incision has healed  The suture was removed  Minimal bruising  He appears neurovascularly intact    He does have left lower extremity swelling  Equivocal Homans  Calves are not particularly tender      Data Reviewed:      XR: AP Y axillary views right shoulder   Clinical Indication    ICD-10-CM    1. Presence of right artificial shoulder joint  Z96.611 XR SHOULDER RIGHT (MIN 2 VIEWS)      2. Aftercare following right shoulder joint replacement surgery  Z47.1 XR SHOULDER RIGHT (MIN 2 VIEWS)    Z96.611 CBC with Auto Differential     Basic Metabolic Panel     Magnesium         Report: AP Y axillary views right shoulder demonstrate reverse right total shoulder arthroplasty in excellent position. Tuberosities are seated    Impression: Status post reverse right total shoulder arthroplasty   Phill Chávez MD        Impression:   1. Presence of right artificial shoulder joint    2. 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 We removed the suture. I refilled his Dilaudid. We will get him thigh-high teds. I would like to measure a CBC, BMP, magnesium level. In regards to his left lower extremity I will out of caution obtain a duplex Doppler of the left lower extremity to rule out DVT. I have advised him to contact his primary care doctor about the swelling.   We will continue physical therapy as per our fracture protocol. We will continue home therapy because it is unsafe for him to leave the home. I will recheck him back in 2 weeks with new AP, Y and axillary views right shoulder    Follow up: Return in about 2 weeks (around 8/10/2022).              Gay Jacques MD

## 2022-07-28 LAB
EST. AVERAGE GLUCOSE BLD GHB EST-MCNC: 103 MG/DL
HBA1C MFR BLD: 5.2 % (ref 4.8–5.6)

## 2022-07-29 ENCOUNTER — HOME CARE VISIT (OUTPATIENT)
Dept: SCHEDULING | Facility: HOME HEALTH | Age: 72
End: 2022-07-29
Payer: MEDICARE

## 2022-07-29 VITALS
OXYGEN SATURATION: 96 % | DIASTOLIC BLOOD PRESSURE: 64 MMHG | SYSTOLIC BLOOD PRESSURE: 110 MMHG | RESPIRATION RATE: 16 BRPM | TEMPERATURE: 97.7 F | HEART RATE: 70 BPM

## 2022-07-29 PROCEDURE — G0151 HHCP-SERV OF PT,EA 15 MIN: HCPCS

## 2022-07-29 ASSESSMENT — ENCOUNTER SYMPTOMS
DYSPNEA ACTIVITY LEVEL: AFTER AMBULATING 10 - 20 FT
PAIN LOCATION - PAIN QUALITY: ACHES, SORE

## 2022-07-29 NOTE — Clinical Note
Patient seen by HHPT for 5 visits to address functional limitations following R reverse TSA. Patient with good progression through 2 weeks. MD ordered 2 more weeks of HHPT before transitioning to outpatient PT. Recommend extension of HHPT by 2 week 2.

## 2022-08-02 ENCOUNTER — HOME CARE VISIT (OUTPATIENT)
Dept: SCHEDULING | Facility: HOME HEALTH | Age: 72
End: 2022-08-02
Payer: MEDICARE

## 2022-08-02 VITALS
OXYGEN SATURATION: 98 % | SYSTOLIC BLOOD PRESSURE: 120 MMHG | HEART RATE: 74 BPM | RESPIRATION RATE: 17 BRPM | DIASTOLIC BLOOD PRESSURE: 80 MMHG | TEMPERATURE: 97.3 F

## 2022-08-02 PROCEDURE — G0157 HHC PT ASSISTANT EA 15: HCPCS

## 2022-08-02 ASSESSMENT — ENCOUNTER SYMPTOMS: PAIN LOCATION - PAIN QUALITY: ACHING, THROBBING

## 2022-08-03 ENCOUNTER — TELEPHONE (OUTPATIENT)
Dept: ORTHOPEDIC SURGERY | Age: 72
End: 2022-08-03

## 2022-08-03 NOTE — TELEPHONE ENCOUNTER
Called and spoke to pt's wife and advised her that [employer] is having issues getting socks in stock. I advised her that they could get compression socks from a medical supply store, [employer], [employer] or [employer]. Pt's wife agreed and having any other questions.

## 2022-08-03 NOTE — TELEPHONE ENCOUNTER
He had surgery a few weeks ago and he has had some swelling in his legs. Tori Heidi was supposed to have someone call him regarding some diabetic socks. He also has a question about Gabapentin.

## 2022-08-04 ENCOUNTER — HOME CARE VISIT (OUTPATIENT)
Dept: SCHEDULING | Facility: HOME HEALTH | Age: 72
End: 2022-08-04
Payer: MEDICARE

## 2022-08-04 VITALS
RESPIRATION RATE: 17 BRPM | HEART RATE: 72 BPM | OXYGEN SATURATION: 99 % | TEMPERATURE: 97.1 F | DIASTOLIC BLOOD PRESSURE: 70 MMHG | SYSTOLIC BLOOD PRESSURE: 120 MMHG

## 2022-08-04 PROCEDURE — G0157 HHC PT ASSISTANT EA 15: HCPCS

## 2022-08-04 ASSESSMENT — ENCOUNTER SYMPTOMS: PAIN LOCATION - PAIN QUALITY: SORE

## 2022-08-08 ENCOUNTER — HOME CARE VISIT (OUTPATIENT)
Dept: SCHEDULING | Facility: HOME HEALTH | Age: 72
End: 2022-08-08
Payer: MEDICARE

## 2022-08-08 VITALS
OXYGEN SATURATION: 96 % | RESPIRATION RATE: 17 BRPM | SYSTOLIC BLOOD PRESSURE: 116 MMHG | HEART RATE: 80 BPM | TEMPERATURE: 97.7 F | DIASTOLIC BLOOD PRESSURE: 62 MMHG

## 2022-08-08 PROCEDURE — G0157 HHC PT ASSISTANT EA 15: HCPCS

## 2022-08-08 ASSESSMENT — ENCOUNTER SYMPTOMS: PAIN LOCATION - PAIN QUALITY: SORE

## 2022-08-10 ENCOUNTER — OFFICE VISIT (OUTPATIENT)
Dept: ORTHOPEDIC SURGERY | Age: 72
End: 2022-08-10

## 2022-08-10 DIAGNOSIS — Z47.1 AFTERCARE FOLLOWING RIGHT SHOULDER JOINT REPLACEMENT SURGERY: ICD-10-CM

## 2022-08-10 DIAGNOSIS — Z96.611 PRESENCE OF RIGHT ARTIFICIAL SHOULDER JOINT: Primary | ICD-10-CM

## 2022-08-10 DIAGNOSIS — Z96.611 AFTERCARE FOLLOWING RIGHT SHOULDER JOINT REPLACEMENT SURGERY: ICD-10-CM

## 2022-08-10 PROCEDURE — 99024 POSTOP FOLLOW-UP VISIT: CPT | Performed by: ORTHOPAEDIC SURGERY

## 2022-08-10 RX ORDER — HYDROMORPHONE HYDROCHLORIDE 2 MG/1
2 TABLET ORAL EVERY 6 HOURS PRN
Qty: 40 TABLET | Refills: 0 | Status: SHIPPED | OUTPATIENT
Start: 2022-08-10 | End: 2022-08-20

## 2022-08-10 NOTE — PROGRESS NOTES
Name: Wing Guadalupe  YOB: 1950  Gender: male  MRN: [id number]          HPI: Wing Guadalupe is a 80 y.o. right-hand-dominant male 4 weeks status post reverse right total shoulder arthroplasty with a delta xtend prosthesis biceps tenodesis for a four-part right proximal humerus fracture with extension into the humeral shaft as well as glenohumeral osteoarthritis right shoulder. He returns noting that he is doing much better. ROS/Meds/PSH/PMH/FH/SH: A ten system review of systems was performed and is negative other than what is in the HPI. Tobacco:  reports that he quit smoking about 13 years ago. He has a 10.00 pack-year smoking history. He has never used smokeless tobacco.  There were no vitals taken for this visit. Physical Examination:  He is an awake alert pleasant gentleman ambulating without difficulty    The left shoulder has 0 to 180 degrees of active and 0 to 180 degrees passive forward elevation. Internal rotation is to T6. External rotation is to 60 degrees at the side. In the 90 degree abducted position 90 degrees of external and 90 degrees internal rotation  The AC joint is non-tender  SC joint is non-tender. Greater tuberosity is non-tender. negative biceps  Negative O'Briens sign  negative lift-off sign  Negative belly press sign  Negative bear huggers sign  negative drop sign  negative hornblower's sign  No posterior glenohumeral joint line tenderness. No evident excessive external rotation  Rotator cuff strength is 5/5.  negative external rotation stress test.   Negative empty can sign  There is no evident anterior or posterior apprehension with a negative sulcus sign. No instability  negative external and internal Rotation lag sign  Neurovascularly intact.     The right shoulder deltopectoral incision has healed  Biceps has good cosmetic appearance  Bruising has resolved  He appears neurovascularly intact            Data Reviewed:      XR: AP Y axillary views

## 2022-08-12 ENCOUNTER — HOME CARE VISIT (OUTPATIENT)
Dept: SCHEDULING | Facility: HOME HEALTH | Age: 72
End: 2022-08-12
Payer: MEDICARE

## 2022-08-12 ENCOUNTER — TELEPHONE (OUTPATIENT)
Dept: ORTHOPEDIC SURGERY | Age: 72
End: 2022-08-12

## 2022-08-12 VITALS
DIASTOLIC BLOOD PRESSURE: 62 MMHG | RESPIRATION RATE: 14 BRPM | TEMPERATURE: 97.4 F | SYSTOLIC BLOOD PRESSURE: 104 MMHG | HEART RATE: 64 BPM | OXYGEN SATURATION: 96 %

## 2022-08-12 DIAGNOSIS — Z47.1 AFTERCARE FOLLOWING RIGHT SHOULDER JOINT REPLACEMENT SURGERY: Primary | ICD-10-CM

## 2022-08-12 DIAGNOSIS — Z96.611 AFTERCARE FOLLOWING RIGHT SHOULDER JOINT REPLACEMENT SURGERY: Primary | ICD-10-CM

## 2022-08-12 PROCEDURE — G0151 HHCP-SERV OF PT,EA 15 MIN: HCPCS

## 2022-08-12 ASSESSMENT — ENCOUNTER SYMPTOMS: PAIN LOCATION - PAIN QUALITY: DULL

## 2022-08-12 NOTE — TELEPHONE ENCOUNTER
Pt order sent to [facility] to start tentatively on 8/29 for elbow hand gentle pendulums per LBF.

## 2022-08-17 PROBLEM — N39.0 URINARY TRACT INFECTION: Status: RESOLVED | Noted: 2022-07-18 | Resolved: 2022-08-17

## 2022-08-19 DIAGNOSIS — Z47.1 AFTERCARE FOLLOWING RIGHT SHOULDER JOINT REPLACEMENT SURGERY: Primary | ICD-10-CM

## 2022-08-19 DIAGNOSIS — Z96.611 AFTERCARE FOLLOWING RIGHT SHOULDER JOINT REPLACEMENT SURGERY: Primary | ICD-10-CM

## 2022-08-19 NOTE — TELEPHONE ENCOUNTER
Pt called and [facility] got the order but pt doesn't want to go there.  Please send order to [facility]

## 2022-08-24 ENCOUNTER — OFFICE VISIT (OUTPATIENT)
Dept: ORTHOPEDIC SURGERY | Age: 72
End: 2022-08-24

## 2022-08-24 DIAGNOSIS — Z96.611 AFTERCARE FOLLOWING RIGHT SHOULDER JOINT REPLACEMENT SURGERY: ICD-10-CM

## 2022-08-24 DIAGNOSIS — Z47.1 AFTERCARE FOLLOWING RIGHT SHOULDER JOINT REPLACEMENT SURGERY: ICD-10-CM

## 2022-08-24 DIAGNOSIS — Z96.611 PRESENCE OF RIGHT ARTIFICIAL SHOULDER JOINT: Primary | ICD-10-CM

## 2022-08-24 PROCEDURE — 99024 POSTOP FOLLOW-UP VISIT: CPT | Performed by: ORTHOPAEDIC SURGERY

## 2022-08-24 RX ORDER — HYDROMORPHONE HYDROCHLORIDE 2 MG/1
2 TABLET ORAL EVERY 6 HOURS PRN
Qty: 40 TABLET | Refills: 0 | Status: SHIPPED | OUTPATIENT
Start: 2022-08-24 | End: 2022-09-03

## 2022-08-24 RX ORDER — GABAPENTIN 100 MG/1
100 CAPSULE ORAL 3 TIMES DAILY
Qty: 90 CAPSULE | Refills: 0 | Status: SHIPPED | OUTPATIENT
Start: 2022-08-24 | End: 2022-09-23

## 2022-08-24 NOTE — PROGRESS NOTES
Name: Sincere Caballero  YOB: 1950  Gender: male  MRN: [id number]          HPI: Sincere Caballero is a 80 y.o. right-hand-dominant male 6 weeks status post reverse right total shoulder arthroplasty with a delta xtend prosthesis biceps tenodesis for a four-part right proximal humerus fracture with extension into the humeral shaft as well as glenohumeral osteoarthritis right shoulder. He returns noting that he is doing much better. ROS/Meds/PSH/PMH/FH/SH: A ten system review of systems was performed and is negative other than what is in the HPI. Tobacco:  reports that he quit smoking about 13 years ago. He has a 10.00 pack-year smoking history. He has never used smokeless tobacco.  There were no vitals taken for this visit. Physical Examination:  He is an awake alert pleasant gentleman ambulating without difficulty    The left shoulder has 0 to 180 degrees of active and 0 to 180 degrees passive forward elevation. Internal rotation is to T6. External rotation is to 60 degrees at the side. In the 90 degree abducted position 90 degrees of external and 90 degrees internal rotation  The AC joint is non-tender  SC joint is non-tender. Greater tuberosity is non-tender. negative biceps  Negative O'Briens sign  negative lift-off sign  Negative belly press sign  Negative bear huggers sign  negative drop sign  negative hornblower's sign  No posterior glenohumeral joint line tenderness. No evident excessive external rotation  Rotator cuff strength is 5/5.  negative external rotation stress test.   Negative empty can sign  There is no evident anterior or posterior apprehension with a negative sulcus sign. No instability  negative external and internal Rotation lag sign  Neurovascularly intact.     The right shoulder deltopectoral incision has healed  Biceps has good cosmetic appearance  Bruising has resolved  Active forward elevation is 0-60  Passively goes 0-1 40  ER to 30 degrees  He appears neurovascularly intact            Data Reviewed:      XR: AP Y axillary views right shoulder   Clinical Indication    ICD-10-CM    1. Presence of right artificial shoulder joint  Z96.611 XR SHOULDER RIGHT (MIN 2 VIEWS)      2. Aftercare following right shoulder joint replacement surgery  Z47.1 XR SHOULDER RIGHT (MIN 2 VIEWS)    Z96.611          Report: AP Y axillary views right shoulder demonstrate reverse right total shoulder arthroplasty in excellent position. Tuberosities are seated    Impression: Status post reverse right total shoulder arthroplasty   Vaughn Seymour MD        Impression:   1. Presence of right artificial shoulder joint    2. Aftercare following right shoulder joint replacement surgery       Status post reverse right total shoulder arthroplasty with a delta xtend prosthesis biceps tenodesis for a complex right proximal humerus fracture 6 weeks  Cervical spondylosis  History of PE/DVT on Xarelto  Atrial fibrillation  Hypertension  Hypercholesterolemia  Obstructive sleep apnea  COPD  Chronic pain on tramadol  Status post bilateral total hip arthroplasties  DISH  Obesity with a BMI over 45    Plan:   I discussed the plan with the patient. I refilled his Dilaudid and gabapentin. We will transition him to outpatient physical therapy working on full motion and full strength [facility] at [facility]. I will recheck him back in 1 month with new AP Y and axillary views right shoulder  Follow up: Return in about 1 month (around 9/24/2022).              Vaughn Seymour MD

## 2022-08-26 ENCOUNTER — HOSPITAL ENCOUNTER (OUTPATIENT)
Dept: PHYSICAL THERAPY | Age: 72
Setting detail: RECURRING SERIES
Discharge: HOME | End: 2022-08-29
Payer: MEDICARE

## 2022-08-26 PROCEDURE — 97161 PT EVAL LOW COMPLEX 20 MIN: CPT

## 2022-08-26 PROCEDURE — 97110 THERAPEUTIC EXERCISES: CPT

## 2022-08-26 ASSESSMENT — PAIN SCALES - GENERAL: PAINLEVEL_OUTOF10: 2

## 2022-08-26 NOTE — PROGRESS NOTES
Zelda Mayer  : 1950  Primary: [address] [facility]  Secondary:  [address] @ [address] [address]  Phone: [phone]  Fax: [phone] Plan Frequency: 1-2 visits per week for 12 weeks    Plan of Care/Certification Expiration Date: 22      PT Visit Info:     Visit Count:  1   OUTPATIENT PHYSICAL THERAPY:OP NOTE TYPE: Treatment Note 2022       Episode  }Appt Desk             Treatment Diagnosis:  Pain in Right Shoulder (M25.511)  Stiffness of Right Shoulder, Not elsewhere classified (M25.611)  Medical/Referring Diagnosis:  Aftercare following joint replacement surgery [Z47.1]  Presence of right artificial shoulder joint [Z96.611]  Referring Physician:  Sunny Valladares MD MD Orders:  PT Eval and Treat, home exercise program and full motion/full strength  Date of Onset:  No data recorded   Allergies:   Meperidine and Morphine  Restrictions/Precautions:  Restrictions/Precautions: Fall Risk  No data recorded   Interventions Planned (Treatment may consist of any combination of the following):    Current Treatment Recommendations: Strengthening; ROM; Manual Therapy - Joint Manipulation; Manual Therapy - Soft Tissue Mobilization; Therapeutic activities; Integrated dry needling; Home exercise program     Subjective Comments:  See evaluation note for details. Initial:}    2 (8-9 at worst)/10Post Session:        (Not rated)/10  Medications Last Reviewed:  2022  Updated Objective Findings:  See evaluation note from today  Treatment   THERAPEUTIC EXERCISE: (15 minutes) to improve R shoulder motion and restore muscle function. Patient performed R shoulder AAROM with pulleys for forward elevation. Isometrics for shoulder flexion and abduction against wall, x 10 repetitions of each. HEP: Patient received handout for the exercises above.     Treatment/Session Summary:    Treatment Assessment:  Did well with exercises to facilitate improved R shoulder motion and deltoid activation. Communication/Consultation:  None today  Equipment provided today:  None  Recommendations/Intent for next treatment session: Next visit will focus on improving R shoulder ROM and strength.     Total Treatment Billable Duration:  15 minutes for therex + 25 minutes for evaluation  Time In: 9781  Time Out: [address] AKOSUA, PT       Charge Capture  }Post Session Pain  PT Visit Info  MedBridge Portal  MD Guidelines  Scanned Media  Benefits  MyChart    Future Appointments   Date Time Provider Daniella Lombardo   8/30/2022 11:00 AM Rafael Chávez, PT [facility]   9/1/2022  1:45 PM Valley Brick, PTA [facility]   9/6/2022  8:00 AM Valley Brick, PTA [facility]   9/8/2022 11:00 AM Rafael Chávez, PT [facility]   9/13/2022  9:45 AM Valley Brick, PTA [facility]   9/15/2022 11:00 AM Rafael Chávez, PT [facility]   9/20/2022 11:15 AM Eleuterio Friedman, PTA [facility]   9/22/2022 11:00 AM Rafael Chávez, PT [facility]   9/27/2022  8:00 AM Eleuterio Brick, PTA [facility]   9/27/2022  9:00 AM MD SREEDHAR Nguyen [facility]   9/29/2022 11:00 AM Rafael Chávez, PT [facility]   10/4/2022 11:15 AM Eleuterio Friedman, PTA [facility]   10/6/2022 11:45 AM Rafael Chávez, PT [facility]

## 2022-08-26 NOTE — THERAPY EVALUATION
Stepan Boles  : 1950  Primary: [facility] Ppo  Secondary:  Kathryn Terrazas @ [facility] [address] [address]  Phone: [phone]  Fax: [phone] Plan Frequency: 1-2 visits per week for 12 weeks  Plan of Care/Certification Expiration Date: 22    PT Visit Info:        Visit Count:  1    OUTPATIENT PHYSICAL THERAPY:OP NOTE TYPE: Initial Assessment 2022               Episode  Appt Desk         Treatment Diagnosis:  Pain in Right Shoulder (M25.511)  Stiffness of Right Shoulder, Not elsewhere classified (M25.611)  Medical/Referring Diagnosis:  Aftercare following joint replacement surgery [Z47.1]  Presence of right artificial shoulder joint [Z96.611]  Referring Physician:  Massimo Yoon MD MD Orders:  PT Eval and Treat, home exercise program, and full motion/full strength  Return MD Appt:  1 month  Date of Onset:  No data recorded   Allergies:  Meperidine and Morphine  Restrictions/Precautions:    Restrictions/Precautions: Fall Risk  No data recorded   Medications Last Reviewed:  2022     SUBJECTIVE   History of Injury/Illness (Reason for Referral):  Patient underwent R reverse total shoulder arthroplasty ~22 after falling on 22. Patient is R handed. Patient has difficulty with raising R arm and ROM in general is difficult. Returns to MD in about a month. R shoulder pain wakes him up at night. Has had both hips replaced. Has been experiencing some numbness and tingling to R 4th and 5th digits since surgery.   Patient Stated Goal(s):  \"Wants to improve his ROM and strength\"  Initial:     2 (8-9 at worst)/10 Post Session:      (Not rated)/10  Past Medical History/Comorbidities:   Mr. Breanna Lynne  has a past medical history of Arthritis, Atrial fibrillation ([address] Cancer ([address] DISH (diffuse idiopathic skeletal hyperostosis), Hypertension, Osteoporosis, PE (pulmonary thromboembolism) ([facility] [address] Sleep apnea, SOB (shortness of breath) on exertion, and Thromboembolus ([facility] [address] Mr. Edith Patel  has a past surgical history that includes other surgical history; total hip arthroplasty (4/2010); lipoma resection (as a teenager); other surgical history; [place] tooth extraction (as a teenager); joint replacement (Left); Multiple tooth extractions; and shoulder surgery (Right, 7/15/2022). Social History/Living Environment:   Lives With: Spouse  Type of Home: House  Home Layout: One level; Able to Live on Main level with bedroom/bathroom  Home Access: Stairs to enter without rails  Entrance Stairs - Number of Steps: 2     Prior Level of Function/Work/Activity:   No data recordedAmbulation Assistance: Independent (with cane for long distances)  Transfer Assistance: Independent  No data recorded   Learning:   No data recorded   Fall Risk Scale: Total Score: 35  [place] Fall Risk: Medium (25-44)         OBJECTIVE   Observations:    Description: Forward head, kyphotic posture. Initiates movement at R shoulder with upper trapezius/shrug. Sensation:  Overall Sensation Status: Impaired  Cervical:  AROM Cervical Spine   Cervical spine general AROM: 70% rotation bilaterally. Extension limited by 50%. Cervical and UE Strength  General Strength Testing UE:  (Able to withstand strength isometrically in supine for shoulder flexion, abduction, ER and IR)  Shoulder:  AROM Shoulder (Degrees)  R Shoulder Flexion 0-180: 65 (Forward elevation)  L Shoulder Flexion 0-180: 130  L Shoulder ABduction 0-180: 130  PROM Shoulder (Degrees)  R Shoulder Flex  0-180: 140 (Forward elevation)  R Shoulder Ext Rotation  0-90: 45 (In supine at 45 degrees abduction)  Shoulder Elbow Strength Testing (MMT)  General Strength Testing UE:  (Able to withstand strength isometrically in supine for shoulder flexion, abduction, ER and IR)     ASSESSMENT   Initial Assessment:  Patient presents to PT s/p R reverse total shoulder arthroplasty.  He demonstrates reduced R shoulder forward elevation AROM with tendency to utilize upper trapezius compensation. He is likely to benefit from PT intervention in order to improve R upper extremity function and reduce discomfort. Problem List: (Impacting functional limitations): Body Structures, Functions, Activity Limitations Requiring Skilled Therapeutic Intervention: Decreased ROM; Decreased body mechanics; Decreased strength; Decreased sensation; Increased pain; Decreased posture   Therapy Prognosis:   Therapy Prognosis: Good   Assessment Complexity:   Low Complexity  PLAN   Effective Dates: 8/26/22 TO Plan of Care/Certification Expiration Date: 11/18/22   Frequency/Duration: Plan Frequency: 1-2 visits per week for 12 weeks   Interventions Planned (Treatment may consist of any combination of the following):    Current Treatment Recommendations: Strengthening; ROM; Manual Therapy - Joint Manipulation; Manual Therapy - Soft Tissue Mobilization; Therapeutic activities; Integrated dry needling; Home exercise program   Goals: (Goals have been discussed and agreed upon with patient.)  Short-Term Functional Goals: Time Frame: 6 weeks  Patient will improve score on DASH to <50%. Patient will be able to achieve 100 degrees of R shoulder forward elevation AROM. Patient will have 5/5 R shoulder forward elevation strength. Discharge Goals: Time Frame: 12 weeks  Patient will improve score on DASH to <25%. Patient will have 120 degrees or greater R forward elevation AROM. Patient will be independent with HEP. Patient will report being able to sleep without limitation from R shoulder pain. Outcome Measure: Tool Used: Disabilities of the Arm, Shoulder and Hand (DASH) Questionnaire - Quick Version  Score:  Initial: 40/55 or 66% limited Most Recent: X/55 (Date: -- )   Interpretation of Score: The DASH is designed to measure the activities of daily living in person's with upper extremity dysfunction or pain.   Each section is scored on a 1-5 scale, 5 representing the greatest disability. The scores of each section are added together for a total score of 55. Medical Necessity:   > Skilled intervention continues to be required due to recent R reverse total shoulder arthroplasty. Reason For Services/Other Comments:  > Patient continues to require skilled intervention due to R reverse total shoulder arthroplasty and subsequent deficits with motion, strength and function. Regarding Amanda Felton CZPVI'M therapy, I certify that the treatment plan above will be carried out by a therapist or under their direction.   Thank you for this referral,  Leisa Flowers, PT       Referring Physician Signature:   Aiden Bautista., MD _______________________________ Date _____________        Post Session Pain  Charge Capture  PT Visit Info MD Guidelines  [place]

## 2022-08-30 ENCOUNTER — HOSPITAL ENCOUNTER (OUTPATIENT)
Dept: PHYSICAL THERAPY | Age: 72
Setting detail: RECURRING SERIES
Discharge: HOME | End: 2022-09-02
Payer: MEDICARE

## 2022-08-30 PROCEDURE — 97110 THERAPEUTIC EXERCISES: CPT

## 2022-08-30 NOTE — PROGRESS NOTES
Wing Guadalupe  : 1950  Primary: Liam Guthrie Ppo  Secondary:  [address] @ [address] [address]  Phone: [phone]  Fax: [phone] Plan Frequency: 1-2 visits per week for 12 weeks    Plan of Care/Certification Expiration Date: 22      PT Visit Info:     Visit Count:  2   OUTPATIENT PHYSICAL THERAPY:OP NOTE TYPE: Treatment Note 2022       Episode  }Appt Desk             Treatment Diagnosis:  Pain in Right Shoulder (M25.511)  Stiffness of Right Shoulder, Not elsewhere classified (M25.611)  Medical/Referring Diagnosis:  Aftercare following joint replacement surgery [Z47.1]  Presence of right artificial shoulder joint [Z96.611]  Referring Physician:  Armida Stokes MD MD Orders:  PT Eval and Treat, home exercise program and full motion/full strength  Date of Onset:  No data recorded   Allergies:   Meperidine and Morphine  Restrictions/Precautions:  Restrictions/Precautions: Fall Risk  No data recorded   Interventions Planned (Treatment may consist of any combination of the following):    Current Treatment Recommendations: Strengthening; ROM; Manual Therapy - Joint Manipulation; Manual Therapy - Soft Tissue Mobilization; Therapeutic activities; Integrated dry needling; Home exercise program     Subjective Comments:  Patient reports that his shoulder is feeling ok. Initial:}     (Not rated)/10Post Session:        (Not rated)/10  Medications Last Reviewed:  2022  Updated Objective Findings:  None today  Treatment   THERAPEUTIC EXERCISE: (40 minutes) to improve R shoulder motion and restore muscle function. Passive ranging to R shoulder into forward elevation, scaption, and ER with scapula stabilized to improve R upper extremity motion.  PNF diagonals in side-lying progressing passive to active-assisted to R upper extremity.    - Supine presses with active-assistance from L UE x 10, with hands clasped x 10 and with wand ,3 x 10 B.  - Scapular retractions with elbows extended, 3 x 12 red band bilateral  - Side lying abductions, x 10 with assistance, x 5 isometric holds, x 5 eccentric lowering  - Bentover rows without added resistance 3 x 10 R  - Side lying mid trap 3 x 6   - PNF diagonals in L side lying with contract-relax to L upper trapezius  - Triceps ext, red band bilateral 3 x 10 B  - Shoulder extensions/scapular retractions 3 x 10 with red theraband    HEP: Patient received handout for bilateral shoulder extensions, bentover rows and supine presses with wand    Treatment/Session Summary:    Treatment Assessment:  Good performance with exercises but weak to R UE. Unable to perform wall slides as of yet, but otherwise doing well. R deltoid needs strengthening. Communication/Consultation:  None today  Equipment provided today:  None  Recommendations/Intent for next treatment session: Next visit will focus on improving R shoulder ROM and strength.     Total Treatment Billable Duration:  40 minutes  Time In: 1100  Time Out: [address] [facility],        Charge Capture  }Post Session Pain  PT Visit Info  MedBridge Portal  MD Guidelines  Scanned Media  Benefits  [email or web address]    Future Appointments   Date Time Provider Daniella Lombardo   9/1/2022  1:45 PM Ana Maria Estrada PTA [facility]   9/6/2022  8:00 AM Ana Maria Estrada, PTA [facility]   9/8/2022 11:00 AM Silvia Litten, PT [facility]   9/13/2022  9:45 AM Ana Maria Estrada, PTA [facility]   9/15/2022 11:00 AM Silvia Litten, PT [facility]   9/20/2022 11:15 AM Ana Maria Estrada PTA [facility]   9/22/2022 11:00 AM Silvia Litten, PT [facility]   9/27/2022  8:00 AM Ana Maria Estrada, PTA [facility]   9/27/2022  9:00 AM Rosann Rubinstein., MD [facility]   9/29/2022 11:00 AM Silvia Litten, PT [facility]   10/4/2022 11:15 AM Ana Maria Estrada, PTA [facility]   10/6/2022 11:45 AM Silvia Litten, PT [facility]

## 2022-09-01 ENCOUNTER — HOSPITAL ENCOUNTER (OUTPATIENT)
Dept: PHYSICAL THERAPY | Age: 72
Setting detail: RECURRING SERIES
Discharge: HOME | End: 2022-09-04
Payer: MEDICARE

## 2022-09-01 PROCEDURE — 97110 THERAPEUTIC EXERCISES: CPT

## 2022-09-01 ASSESSMENT — PAIN SCALES - GENERAL: PAINLEVEL_OUTOF10: 0

## 2022-09-01 NOTE — PROGRESS NOTES
Bjorn Larios  : 1950  Primary: Neha German Ppo  Secondary:  [address] @ [address] [address]  Phone: [phone]  Fax: [phone] Plan Frequency: 1-2 visits per week for 12 weeks    Plan of Care/Certification Expiration Date: 22      PT Visit Info:     Visit Count:  3   OUTPATIENT PHYSICAL THERAPY:OP NOTE TYPE: Treatment Note 2022       Episode  }Appt Desk             Treatment Diagnosis:  Pain in Right Shoulder (M25.511)  Stiffness of Right Shoulder, Not elsewhere classified (M25.611)  Medical/Referring Diagnosis:  Aftercare following joint replacement surgery [Z47.1]  Presence of right artificial shoulder joint [Z96.611]  Referring Physician:  Roland Brown MD MD Orders:  PT Eval and Treat, home exercise program and full motion/full strength  Date of Onset:  No data recorded   Allergies:   Meperidine and Morphine  Restrictions/Precautions:  Restrictions/Precautions: Fall Risk  No data recorded   Interventions Planned (Treatment may consist of any combination of the following):    Current Treatment Recommendations: Strengthening; ROM; Manual Therapy - Joint Manipulation; Manual Therapy - Soft Tissue Mobilization; Therapeutic activities; Integrated dry needling; Home exercise program     Subjective Comments:  Pt does not report any pain but reports difficulty with over head motions. Initial:}    0/10Post Session:       0/10  Medications Last Reviewed:  2022  Updated Objective Findings:  None today  Treatment   THERAPEUTIC EXERCISE: (40 minutes) to improve R shoulder motion and restore muscle function.  Passive ranging to R shoulder into forward elevation, scaption, and ER with scapula stabilized to improve R upper extremity motion.    - Supine presses with active-assistance from with wand ,3 x 10 B.  - Side lying abduction 2x10   - Side lying mid trap 2x10 with assistance   - side lying ER 2x10  -supine cane ER 2x10  -supine cane flexion 3x10    MODALITIES: (15 minutes-unbilled): Gameready vasopnuematic device to R shoulder in sitting at coldest setting, low compression. HEP: Patient will continue the current HEP. Treatment/Session Summary:    Treatment Assessment:  Pt did not report increased pain with activity but is very tight and limited with all motions. Continue to progress strengthening and AROM. Communication/Consultation:  None today  Equipment provided today:  None  Recommendations/Intent for next treatment session: Next visit will focus on improving R shoulder ROM and strength.     Total Treatment Billable Duration:  40 minutes  Time In: 2702  Time Out: [address], [place]       Charge Capture  }Post Session Pain  PT Visit Info  [email or web address] Portal  MD Guidelines  Scanned Media  Benefits  MyChart    Future Appointments   Date Time Provider Daniella Lombardo   9/6/2022  8:00 AM Jewel Shed, PTA [facility]   9/8/2022 11:00 AM Dean Yu, PT [facility]   9/13/2022  9:45 AM Jewel Shed, PTA [facility]   9/15/2022 11:00 AM Dean Yu, PT [facility]   9/20/2022 11:15 AM Jewel Shed, PTA [facility]   9/22/2022 11:00 AM Dean Yu, PT [facility]   9/27/2022  8:00 AM Jewel Shed, PTA [facility]   9/27/2022  9:00 AM Art aBh MD [facility]   9/29/2022 11:00 AM Dean Yu, PT [facility]   10/4/2022 11:15 AM Jewel Shed, PTA [facility]   10/6/2022 11:45 AM Dean Yu, PT [facility]

## 2022-09-06 ENCOUNTER — APPOINTMENT (OUTPATIENT)
Dept: PHYSICAL THERAPY | Age: 72
End: 2022-09-06
Payer: MEDICARE

## 2022-09-08 ENCOUNTER — APPOINTMENT (OUTPATIENT)
Dept: PHYSICAL THERAPY | Age: 72
End: 2022-09-08
Payer: MEDICARE

## 2022-09-13 ENCOUNTER — APPOINTMENT (OUTPATIENT)
Dept: PHYSICAL THERAPY | Age: 72
End: 2022-09-13
Payer: MEDICARE

## 2022-09-15 ENCOUNTER — APPOINTMENT (OUTPATIENT)
Dept: PHYSICAL THERAPY | Age: 72
End: 2022-09-15
Payer: MEDICARE

## 2022-09-20 ENCOUNTER — APPOINTMENT (OUTPATIENT)
Dept: PHYSICAL THERAPY | Age: 72
End: 2022-09-20
Payer: MEDICARE

## 2022-09-22 ENCOUNTER — APPOINTMENT (OUTPATIENT)
Dept: PHYSICAL THERAPY | Age: 72
End: 2022-09-22
Payer: MEDICARE

## 2022-09-27 ENCOUNTER — OFFICE VISIT (OUTPATIENT)
Dept: ORTHOPEDIC SURGERY | Age: 72
End: 2022-09-27

## 2022-09-27 ENCOUNTER — APPOINTMENT (OUTPATIENT)
Dept: PHYSICAL THERAPY | Age: 72
End: 2022-09-27
Payer: MEDICARE

## 2022-09-27 DIAGNOSIS — Z47.1 AFTERCARE FOLLOWING RIGHT SHOULDER JOINT REPLACEMENT SURGERY: ICD-10-CM

## 2022-09-27 DIAGNOSIS — Z96.611 AFTERCARE FOLLOWING RIGHT SHOULDER JOINT REPLACEMENT SURGERY: ICD-10-CM

## 2022-09-27 DIAGNOSIS — Z96.611 PRESENCE OF RIGHT ARTIFICIAL SHOULDER JOINT: Primary | ICD-10-CM

## 2022-09-27 PROCEDURE — 99024 POSTOP FOLLOW-UP VISIT: CPT | Performed by: ORTHOPAEDIC SURGERY

## 2022-09-27 NOTE — PROGRESS NOTES
Name: Odalis Enamorado  YOB: 1950  Gender: male  MRN: [id number]          HPI: Odalis Enamorado is a 71 y.o. right-hand-dominant male over 2 months status post reverse right total shoulder arthroplasty with a delta xtend prosthesis biceps tenodesis for a four-part right proximal humerus fracture with extension into the humeral shaft as well as glenohumeral osteoarthritis right shoulder. He returns noting that he is doing much better. ROS/Meds/PSH/PMH/FH/SH: A ten system review of systems was performed and is negative other than what is in the HPI. Tobacco:  reports that he quit smoking about 13 years ago. He has a 10.00 pack-year smoking history. He has never used smokeless tobacco.  There were no vitals taken for this visit. Physical Examination:  He is an awake alert pleasant gentleman ambulating without difficulty    The left shoulder has 0 to 180 degrees of active and 0 to 180 degrees passive forward elevation. Internal rotation is to T6. External rotation is to 60 degrees at the side. In the 90 degree abducted position 90 degrees of external and 90 degrees internal rotation  The AC joint is non-tender  SC joint is non-tender. Greater tuberosity is non-tender. negative biceps  Negative O'Briens sign  negative lift-off sign  Negative belly press sign  Negative bear huggers sign  negative drop sign  negative hornblower's sign  No posterior glenohumeral joint line tenderness. No evident excessive external rotation  Rotator cuff strength is 5/5.  negative external rotation stress test.   Negative empty can sign  There is no evident anterior or posterior apprehension with a negative sulcus sign. No instability  negative external and internal Rotation lag sign  Neurovascularly intact.     The right shoulder deltopectoral incision has healed  Biceps has good cosmetic appearance  Active forward elevation is 0-100  Passively goes 0-1 60  ER to 30 degrees  He appears neurovascularly intact            Data Reviewed:      XR: AP Y axillary views right shoulder   Clinical Indication    ICD-10-CM    1. Presence of right artificial shoulder joint  Z96.611 XR SHOULDER RIGHT (MIN 2 VIEWS)      2. Aftercare following right shoulder joint replacement surgery  Z47.1 XR SHOULDER RIGHT (MIN 2 VIEWS)    Z96.611          Report: AP Y axillary views right shoulder demonstrate reverse right total shoulder arthroplasty in excellent position. Tuberosities are seated    Impression: Status post reverse right total shoulder arthroplasty   Jose Holland MD        Impression:   1. Presence of right artificial shoulder joint    2. Aftercare following right shoulder joint replacement surgery       Status post reverse right total shoulder arthroplasty with a delta xtend prosthesis biceps tenodesis for a complex right proximal humerus fracture over 2 months  Cervical spondylosis  History of PE/DVT on Xarelto  Atrial fibrillation  Hypertension  Hypercholesterolemia  Obstructive sleep apnea  COPD  Chronic pain on tramadol  Status post bilateral total hip arthroplasties  DISH  Obesity with a BMI over 45    Plan:   I discussed the plan with the patient. He is doing much better. We will continue physical therapy working on full strength and full motion. I will recheck her back in 6 weeks with new AP Y and axillary views right shoulder  Follow up: Return in about 6 weeks (around 11/8/2022).              Jose Holland MD

## 2022-09-29 ENCOUNTER — APPOINTMENT (OUTPATIENT)
Dept: PHYSICAL THERAPY | Age: 72
End: 2022-09-29
Payer: MEDICARE

## 2022-11-08 ENCOUNTER — OFFICE VISIT (OUTPATIENT)
Dept: ORTHOPEDIC SURGERY | Age: 72
End: 2022-11-08
Payer: MEDICARE

## 2022-11-08 DIAGNOSIS — Z96.611 PRESENCE OF RIGHT ARTIFICIAL SHOULDER JOINT: Primary | ICD-10-CM

## 2022-11-08 DIAGNOSIS — Z09 FOLLOW-UP EXAMINATION AFTER ORTHOPEDIC SURGERY: ICD-10-CM

## 2022-11-08 PROCEDURE — 99212 OFFICE O/P EST SF 10 MIN: CPT | Performed by: ORTHOPAEDIC SURGERY

## 2022-11-08 PROCEDURE — 1123F ACP DISCUSS/DSCN MKR DOCD: CPT | Performed by: ORTHOPAEDIC SURGERY

## 2022-11-08 NOTE — PROGRESS NOTES
Name: Madiha Chun  YOB: 1950  Gender: male  MRN: [id number]          HPI: Madiha Chun is a 71 y.o. right-hand-dominant male 4 months status post reverse right total shoulder arthroplasty with a delta xtend prosthesis biceps tenodesis for a four-part right proximal humerus fracture with extension into the humeral shaft as well as glenohumeral osteoarthritis right shoulder. He returns noting that he is doing much better. He is getting stronger with physical therapy    ROS/Meds/PSH/PMH/FH/SH: A ten system review of systems was performed and is negative other than what is in the HPI. Tobacco:  reports that he quit smoking about 13 years ago. He has a 10.00 pack-year smoking history. He has never used smokeless tobacco.  There were no vitals taken for this visit. Physical Examination:  He is an awake alert pleasant gentleman ambulating without difficulty    The left shoulder has 0 to 180 degrees of active and 0 to 180 degrees passive forward elevation. Internal rotation is to T6. External rotation is to 60 degrees at the side. In the 90 degree abducted position 90 degrees of external and 90 degrees internal rotation  The AC joint is non-tender  SC joint is non-tender. Greater tuberosity is non-tender. negative biceps  Negative O'Briens sign  negative lift-off sign  Negative belly press sign  Negative bear huggers sign  negative drop sign  negative hornblower's sign  No posterior glenohumeral joint line tenderness. No evident excessive external rotation  Rotator cuff strength is 5/5.  negative external rotation stress test.   Negative empty can sign  There is no evident anterior or posterior apprehension with a negative sulcus sign. No instability  negative external and internal Rotation lag sign  Neurovascularly intact.     The right shoulder deltopectoral incision has healed  Biceps has good cosmetic appearance  Active forward elevation is 0-120  Passively goes 0-1 60  ER to 40 degrees  IR to T12  He appears neurovascularly intact            Data Reviewed:      XR: AP Y axillary views right shoulder   Clinical Indication    ICD-10-CM    1. Presence of right artificial shoulder joint  Z96.611 XR SHOULDER RIGHT (MIN 2 VIEWS)      2. Follow-up examination after orthopedic surgery  Z09 XR SHOULDER RIGHT (MIN 2 VIEWS)         Report: AP Y axillary views right shoulder demonstrate reverse right total shoulder arthroplasty in excellent position. Tuberosities are seated    Impression: Status post reverse right total shoulder arthroplasty   Christina Dobbs MD        Impression:   1. Presence of right artificial shoulder joint    2. Follow-up examination after orthopedic surgery       Status post reverse right total shoulder arthroplasty with a delta xtend prosthesis biceps tenodesis for a complex right proximal humerus fracture 4 months  Cervical spondylosis  History of PE/DVT on Xarelto  Atrial fibrillation  Hypertension  Hypercholesterolemia  Obstructive sleep apnea  COPD  Chronic pain on tramadol  Status post bilateral total hip arthroplasties  DISH  Obesity with a BMI over 45    Plan:   I discussed the plan with the patient. He will continue physical therapy working on full motion and full strength for 6 weeks. I will recheck her back in 2 months with new AP, Y and axillary views right shoulder    2. Self-limited problem  Follow up: Return in about 2 months (around 1/8/2023).              Christina Dobbs MD

## 2022-11-20 NOTE — PROGRESS NOTES
Zelda Mayer  : 1950  Primary: [facility]  Secondary:  [address] @ [address] [address]  Phone: [phone]  Fax: [phone]       PT Visit Info:        Visit Count:  1    OUTPATIENT PHYSICAL THERAPY:OP NOTE TYPE: Discontinuation Summary                Episode  Appt Desk         Treatment Diagnosis:  Pain in Right Shoulder (M25.511)  Stiffness of Right Shoulder, Not elsewhere classified (M25.611)  Medical/Referring Diagnosis:  Aftercare following joint replacement surgery [Z47.1]  Presence of right artificial shoulder joint [Z96.611]  Referring Physician:  Sunny Valladares MD MD Orders:  PT Eval and Treat, home exercise program, and full motion/full strength  Return MD Appt:  1 month  Date of Onset:  No data recorded   Allergies:  Meperidine and Morphine  Restrictions/Precautions:    Restrictions/Precautions: Fall Risk  No data recorded   Medications Last Reviewed:  2022       ASSESSMENT   Discontinuation Assessment:  Patient attended 3 PT appointments s/p R reverse total shoulder arthroplasty. His current episode of physical therapy will be discontinued at this time as he opted to have his rehabilitation performed at another facility that is closer to his home. His goals were not met due to the short duration of his treatment at this clinic. PLAN     Goals: (Goals have been discussed and agreed upon with patient.)  Short-Term Functional Goals:   Patient will improve score on DASH to <50%. Patient will be able to achieve 100 degrees of R shoulder forward elevation AROM. Patient will have 5/5 R shoulder forward elevation strength. Discharge Goals:  Patient will improve score on DASH to <25%. Patient will have 120 degrees or greater R forward elevation AROM. Patient will be independent with HEP. Patient will report being able to sleep without limitation from R shoulder pain. Outcome Measure:    Tool Used: Disabilities of the Arm, Shoulder and Hand (DASH) Questionnaire - Quick Version  Score:  Initial: 40/55 or 66% limited Most Recent: X/55 (Date: -- )   Interpretation of Score: The DASH is designed to measure the activities of daily living in person's with upper extremity dysfunction or pain. Each section is scored on a 1-5 scale, 5 representing the greatest disability. The scores of each section are added together for a total score of 55.       Skylar Delacruz, PT  11/20/22        Post Session Pain  Charge Capture  PT Visit Info MD Guidelines  [place]

## 2023-01-09 ENCOUNTER — OFFICE VISIT (OUTPATIENT)
Dept: ORTHOPEDIC SURGERY | Age: 73
End: 2023-01-09

## 2023-01-09 DIAGNOSIS — Z96.611 PRESENCE OF RIGHT ARTIFICIAL SHOULDER JOINT: Primary | ICD-10-CM

## 2023-01-09 DIAGNOSIS — Z09 FOLLOW-UP EXAMINATION AFTER ORTHOPEDIC SURGERY: ICD-10-CM

## 2023-01-09 NOTE — PROGRESS NOTES
Name: Lindsey Barraza  YOB: 1950  Gender: male  MRN: [id number]          HPI: Lindsey Barraza is a 71 y.o. right-hand-dominant male 6 months status post reverse right total shoulder arthroplasty with a delta xtend prosthesis biceps tenodesis for a four-part right proximal humerus fracture with extension into the humeral shaft as well as glenohumeral osteoarthritis right shoulder. He returns noting that he is doing much better. He is getting stronger. He wants to rehab on his own    ROS/Meds/PSH/PMH/FH/SH: A ten system review of systems was performed and is negative other than what is in the HPI. Tobacco:  reports that he quit smoking about 14 years ago. He has a 10.00 pack-year smoking history. He has never used smokeless tobacco.  There were no vitals taken for this visit. Physical Examination:  He is an awake alert pleasant gentleman ambulating without difficulty    The left shoulder has 0 to 180 degrees of active and 0 to 180 degrees passive forward elevation. Internal rotation is to T6. External rotation is to 60 degrees at the side. In the 90 degree abducted position 90 degrees of external and 90 degrees internal rotation  The AC joint is non-tender  SC joint is non-tender. Greater tuberosity is non-tender. negative biceps  Negative O'Briens sign  negative lift-off sign  Negative belly press sign  Negative bear huggers sign  negative drop sign  negative hornblower's sign  No posterior glenohumeral joint line tenderness. No evident excessive external rotation  Rotator cuff strength is 5/5.  negative external rotation stress test.   Negative empty can sign  There is no evident anterior or posterior apprehension with a negative sulcus sign. No instability  negative external and internal Rotation lag sign  Neurovascularly intact.     The right shoulder deltopectoral incision has healed  Biceps has good cosmetic appearance  Active forward elevation is 0-160  Passively goes 0-1 60  ER to 40 degrees  IR to T12  He appears neurovascularly intact            Data Reviewed:      XR: AP Y axillary views right shoulder   Clinical Indication    ICD-10-CM    1. Presence of right artificial shoulder joint  Z96.611 XR SHOULDER RIGHT (MIN 2 VIEWS)      2. Follow-up examination after orthopedic surgery  Z09 XR SHOULDER RIGHT (MIN 2 VIEWS)         Report: AP Y axillary views right shoulder demonstrate reverse right total shoulder arthroplasty in excellent position. Tuberosities are seated. Abundant callus formation    Impression: Status post reverse right total shoulder arthroplasty   Mimi Sinclair MD        Impression:   1. Presence of right artificial shoulder joint    2. 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 He wants to rehab on his own. He was provided exercises. If he elects to go back into physical therapy we can do that. He will work on his strength. I will recheck him back in 6 months with new AP, Y and axillary views right shoulder    2. Self-limited problem  Follow up: Return in about 6 months (around 7/9/2023).              Mimi Sinclair MD

## 2023-12-27 ENCOUNTER — HOSPITAL ENCOUNTER (INPATIENT)
Age: 73
LOS: 4 days | Discharge: HOME HEALTH | DRG: 193 | End: 2023-12-31
Attending: EMERGENCY MEDICINE | Admitting: INTERNAL MEDICINE
Payer: MEDICARE

## 2023-12-27 ENCOUNTER — APPOINTMENT (OUTPATIENT)
Dept: GENERAL RADIOLOGY | Age: 73
DRG: 193 | End: 2023-12-27
Payer: MEDICARE

## 2023-12-27 DIAGNOSIS — I48.91 ATRIAL FIBRILLATION WITH RVR (HCC): ICD-10-CM

## 2023-12-27 DIAGNOSIS — G47.33 OSA (OBSTRUCTIVE SLEEP APNEA): ICD-10-CM

## 2023-12-27 DIAGNOSIS — J10.1 INFLUENZA A WITH RESPIRATORY MANIFESTATIONS: ICD-10-CM

## 2023-12-27 DIAGNOSIS — J96.01 ACUTE RESPIRATORY FAILURE WITH HYPOXIA (HCC): Primary | ICD-10-CM

## 2023-12-27 PROBLEM — J96.21 ACUTE ON CHRONIC RESPIRATORY FAILURE WITH HYPOXIA (HCC): Status: ACTIVE | Noted: 2023-12-27

## 2023-12-27 LAB
ALBUMIN SERPL-MCNC: 2.6 G/DL (ref 3.2–4.6)
ALBUMIN/GLOB SERPL: 0.6 (ref 0.4–1.6)
ALP SERPL-CCNC: 49 U/L (ref 50–136)
ALT SERPL-CCNC: 35 U/L (ref 12–65)
ANION GAP SERPL CALC-SCNC: 8 MMOL/L (ref 2–11)
APPEARANCE UR: CLEAR
ARTERIAL PATENCY WRIST A: POSITIVE
AST SERPL-CCNC: 39 U/L (ref 15–37)
BACTERIA URNS QL MICRO: 0 /HPF
BASE EXCESS BLD CALC-SCNC: 0.5 MMOL/L
BDY SITE: ABNORMAL
BILIRUB SERPL-MCNC: 0.6 MG/DL (ref 0.2–1.1)
BILIRUB UR QL: NEGATIVE
BUN SERPL-MCNC: 14 MG/DL (ref 8–23)
CALCIUM SERPL-MCNC: 8.9 MG/DL (ref 8.3–10.4)
CHLORIDE SERPL-SCNC: 93 MMOL/L (ref 103–113)
CO2 SERPL-SCNC: 25 MMOL/L (ref 21–32)
COLOR UR: ABNORMAL
CREAT SERPL-MCNC: 0.7 MG/DL (ref 0.8–1.5)
EPI CELLS #/AREA URNS HPF: ABNORMAL /HPF
GAS FLOW.O2 O2 DELIVERY SYS: ABNORMAL
GLOBULIN SER CALC-MCNC: 4.2 G/DL (ref 2.8–4.5)
GLUCOSE SERPL-MCNC: 150 MG/DL (ref 65–100)
GLUCOSE UR STRIP.AUTO-MCNC: NEGATIVE MG/DL
HCO3 BLD-SCNC: 22.2 MMOL/L (ref 22–26)
HGB UR QL STRIP: ABNORMAL
IPAP/PIP/HIGH PEEP: 16
KETONES UR QL STRIP.AUTO: NEGATIVE MG/DL
LACTATE SERPL-SCNC: 1.7 MMOL/L (ref 0.4–2)
LEUKOCYTE ESTERASE UR QL STRIP.AUTO: NEGATIVE
MAGNESIUM SERPL-MCNC: 2.1 MG/DL (ref 1.8–2.4)
NITRITE UR QL STRIP.AUTO: NEGATIVE
NT PRO BNP: 1277 PG/ML (ref 5–125)
O2/TOTAL GAS SETTING VFR VENT: 100 %
PCO2 BLD: 28.2 MMHG (ref 35–45)
PEEP RESPIRATORY: 8 CMH2O
PH BLD: 7.5 (ref 7.35–7.45)
PH UR STRIP: 6 (ref 5–9)
PO2 BLD: 124 MMHG (ref 75–100)
POTASSIUM SERPL-SCNC: 2.8 MMOL/L (ref 3.5–5.1)
PROCALCITONIN SERPL-MCNC: 0.27 NG/ML (ref 0–0.49)
PROT SERPL-MCNC: 6.8 G/DL (ref 6.3–8.2)
PROT UR STRIP-MCNC: 30 MG/DL
RESPIRATORY RATE, POC: 48 (ref 5–40)
SAO2 % BLD: 99.2 % (ref 95–98)
SERVICE CMNT-IMP: ABNORMAL
SERVICE CMNT-IMP: ABNORMAL
SODIUM SERPL-SCNC: 126 MMOL/L (ref 136–146)
SP GR UR REFRACTOMETRY: 1.01 (ref 1–1.02)
SPECIMEN TYPE: ABNORMAL
UROBILINOGEN UR QL STRIP.AUTO: 1 EU/DL (ref 0.2–1)
VENTILATION MODE VENT: ABNORMAL
WBC URNS QL MICRO: ABNORMAL /HPF
YEAST URNS QL MICRO: ABNORMAL

## 2023-12-27 PROCEDURE — 96367 TX/PROPH/DG ADDL SEQ IV INF: CPT

## 2023-12-27 PROCEDURE — 6360000002 HC RX W HCPCS

## 2023-12-27 PROCEDURE — 94660 CPAP INITIATION&MGMT: CPT

## 2023-12-27 PROCEDURE — 96375 TX/PRO/DX INJ NEW DRUG ADDON: CPT

## 2023-12-27 PROCEDURE — 1100000000 HC RM PRIVATE

## 2023-12-27 PROCEDURE — 96366 THER/PROPH/DIAG IV INF ADDON: CPT

## 2023-12-27 PROCEDURE — 36600 WITHDRAWAL OF ARTERIAL BLOOD: CPT

## 2023-12-27 PROCEDURE — 83735 ASSAY OF MAGNESIUM: CPT

## 2023-12-27 PROCEDURE — 81001 URINALYSIS AUTO W/SCOPE: CPT

## 2023-12-27 PROCEDURE — 99223 1ST HOSP IP/OBS HIGH 75: CPT

## 2023-12-27 PROCEDURE — 99285 EMERGENCY DEPT VISIT HI MDM: CPT

## 2023-12-27 PROCEDURE — 87040 BLOOD CULTURE FOR BACTERIA: CPT

## 2023-12-27 PROCEDURE — 83880 ASSAY OF NATRIURETIC PEPTIDE: CPT

## 2023-12-27 PROCEDURE — 96374 THER/PROPH/DIAG INJ IV PUSH: CPT

## 2023-12-27 PROCEDURE — 2580000003 HC RX 258

## 2023-12-27 PROCEDURE — 2580000003 HC RX 258: Performed by: EMERGENCY MEDICINE

## 2023-12-27 PROCEDURE — 80053 COMPREHEN METABOLIC PANEL: CPT

## 2023-12-27 PROCEDURE — 84145 PROCALCITONIN (PCT): CPT

## 2023-12-27 PROCEDURE — 71045 X-RAY EXAM CHEST 1 VIEW: CPT

## 2023-12-27 PROCEDURE — 85025 COMPLETE CBC W/AUTO DIFF WBC: CPT

## 2023-12-27 PROCEDURE — 96365 THER/PROPH/DIAG IV INF INIT: CPT

## 2023-12-27 PROCEDURE — 5A09357 ASSISTANCE WITH RESPIRATORY VENTILATION, LESS THAN 24 CONSECUTIVE HOURS, CONTINUOUS POSITIVE AIRWAY PRESSURE: ICD-10-PCS | Performed by: INTERNAL MEDICINE

## 2023-12-27 PROCEDURE — 83605 ASSAY OF LACTIC ACID: CPT

## 2023-12-27 PROCEDURE — 2500000003 HC RX 250 WO HCPCS: Performed by: EMERGENCY MEDICINE

## 2023-12-27 PROCEDURE — 93005 ELECTROCARDIOGRAM TRACING: CPT | Performed by: EMERGENCY MEDICINE

## 2023-12-27 PROCEDURE — 82803 BLOOD GASES ANY COMBINATION: CPT

## 2023-12-27 PROCEDURE — 6360000002 HC RX W HCPCS: Performed by: EMERGENCY MEDICINE

## 2023-12-27 RX ORDER — ALBUTEROL SULFATE 2.5 MG/3ML
2.5 SOLUTION RESPIRATORY (INHALATION) EVERY 4 HOURS PRN
Status: DISCONTINUED | OUTPATIENT
Start: 2023-12-27 | End: 2023-12-31 | Stop reason: HOSPADM

## 2023-12-27 RX ORDER — ONDANSETRON 2 MG/ML
4 INJECTION INTRAMUSCULAR; INTRAVENOUS EVERY 6 HOURS PRN
Status: DISCONTINUED | OUTPATIENT
Start: 2023-12-27 | End: 2023-12-31 | Stop reason: HOSPADM

## 2023-12-27 RX ORDER — POLYETHYLENE GLYCOL 3350 17 G/17G
17 POWDER, FOR SOLUTION ORAL DAILY PRN
Status: DISCONTINUED | OUTPATIENT
Start: 2023-12-27 | End: 2023-12-31 | Stop reason: HOSPADM

## 2023-12-27 RX ORDER — ACETAMINOPHEN 325 MG/1
650 TABLET ORAL EVERY 6 HOURS PRN
Status: DISCONTINUED | OUTPATIENT
Start: 2023-12-27 | End: 2023-12-31 | Stop reason: HOSPADM

## 2023-12-27 RX ORDER — OSELTAMIVIR PHOSPHATE 75 MG/1
75 CAPSULE ORAL ONCE
Status: DISCONTINUED | OUTPATIENT
Start: 2023-12-27 | End: 2023-12-28

## 2023-12-27 RX ORDER — SODIUM CHLORIDE 0.9 % (FLUSH) 0.9 %
5-40 SYRINGE (ML) INJECTION PRN
Status: DISCONTINUED | OUTPATIENT
Start: 2023-12-27 | End: 2023-12-31 | Stop reason: HOSPADM

## 2023-12-27 RX ORDER — MULTIVITAMIN/IRON/FOLIC ACID 18MG-0.4MG
TABLET ORAL
COMMUNITY

## 2023-12-27 RX ORDER — POTASSIUM CHLORIDE 7.45 MG/ML
10 INJECTION INTRAVENOUS ONCE
Status: COMPLETED | OUTPATIENT
Start: 2023-12-27 | End: 2023-12-27

## 2023-12-27 RX ORDER — POTASSIUM CHLORIDE 7.45 MG/ML
10 INJECTION INTRAVENOUS PRN
Status: DISCONTINUED | OUTPATIENT
Start: 2023-12-27 | End: 2023-12-31 | Stop reason: HOSPADM

## 2023-12-27 RX ORDER — SODIUM CHLORIDE 0.9 % (FLUSH) 0.9 %
5-40 SYRINGE (ML) INJECTION EVERY 12 HOURS SCHEDULED
Status: DISCONTINUED | OUTPATIENT
Start: 2023-12-27 | End: 2023-12-31 | Stop reason: HOSPADM

## 2023-12-27 RX ORDER — DILTIAZEM HYDROCHLORIDE 5 MG/ML
15 INJECTION INTRAVENOUS
Status: COMPLETED | OUTPATIENT
Start: 2023-12-27 | End: 2023-12-27

## 2023-12-27 RX ORDER — ACETAMINOPHEN 650 MG/1
650 SUPPOSITORY RECTAL EVERY 6 HOURS PRN
Status: DISCONTINUED | OUTPATIENT
Start: 2023-12-27 | End: 2023-12-31 | Stop reason: HOSPADM

## 2023-12-27 RX ORDER — FUROSEMIDE 10 MG/ML
40 INJECTION INTRAMUSCULAR; INTRAVENOUS ONCE
Status: COMPLETED | OUTPATIENT
Start: 2023-12-27 | End: 2023-12-27

## 2023-12-27 RX ORDER — HYDROMORPHONE HYDROCHLORIDE 1 MG/ML
0.5 INJECTION, SOLUTION INTRAMUSCULAR; INTRAVENOUS; SUBCUTANEOUS
Status: COMPLETED | OUTPATIENT
Start: 2023-12-27 | End: 2023-12-27

## 2023-12-27 RX ORDER — POTASSIUM CHLORIDE 29.8 MG/ML
20 INJECTION INTRAVENOUS PRN
Status: DISCONTINUED | OUTPATIENT
Start: 2023-12-27 | End: 2023-12-31 | Stop reason: HOSPADM

## 2023-12-27 RX ORDER — ONDANSETRON 4 MG/1
4 TABLET, ORALLY DISINTEGRATING ORAL EVERY 8 HOURS PRN
Status: DISCONTINUED | OUTPATIENT
Start: 2023-12-27 | End: 2023-12-31 | Stop reason: HOSPADM

## 2023-12-27 RX ORDER — MAGNESIUM SULFATE IN WATER 40 MG/ML
2000 INJECTION, SOLUTION INTRAVENOUS PRN
Status: DISCONTINUED | OUTPATIENT
Start: 2023-12-27 | End: 2023-12-31 | Stop reason: HOSPADM

## 2023-12-27 RX ORDER — SODIUM CHLORIDE 9 MG/ML
INJECTION, SOLUTION INTRAVENOUS CONTINUOUS
Status: ACTIVE | OUTPATIENT
Start: 2023-12-27 | End: 2023-12-27

## 2023-12-27 RX ORDER — ENOXAPARIN SODIUM 100 MG/ML
30 INJECTION SUBCUTANEOUS 2 TIMES DAILY
Status: DISCONTINUED | OUTPATIENT
Start: 2023-12-27 | End: 2023-12-28

## 2023-12-27 RX ORDER — SODIUM CHLORIDE 9 MG/ML
INJECTION, SOLUTION INTRAVENOUS PRN
Status: DISCONTINUED | OUTPATIENT
Start: 2023-12-27 | End: 2023-12-31 | Stop reason: HOSPADM

## 2023-12-27 RX ADMIN — POTASSIUM CHLORIDE 10 MEQ: 7.46 INJECTION, SOLUTION INTRAVENOUS at 22:02

## 2023-12-27 RX ADMIN — POTASSIUM CHLORIDE 10 MEQ: 7.46 INJECTION, SOLUTION INTRAVENOUS at 20:49

## 2023-12-27 RX ADMIN — SODIUM CHLORIDE 5 MG/HR: 900 INJECTION, SOLUTION INTRAVENOUS at 17:27

## 2023-12-27 RX ADMIN — VANCOMYCIN HYDROCHLORIDE 2500 MG: 10 INJECTION, POWDER, LYOPHILIZED, FOR SOLUTION INTRAVENOUS at 17:47

## 2023-12-27 RX ADMIN — SODIUM CHLORIDE, PRESERVATIVE FREE 10 ML: 5 INJECTION INTRAVENOUS at 22:03

## 2023-12-27 RX ADMIN — DILTIAZEM HYDROCHLORIDE 15 MG: 5 INJECTION, SOLUTION INTRAVENOUS at 17:40

## 2023-12-27 RX ADMIN — HYDROMORPHONE HYDROCHLORIDE 0.5 MG: 1 INJECTION, SOLUTION INTRAMUSCULAR; INTRAVENOUS; SUBCUTANEOUS at 19:24

## 2023-12-27 RX ADMIN — POTASSIUM CHLORIDE 10 MEQ: 7.46 INJECTION, SOLUTION INTRAVENOUS at 23:09

## 2023-12-27 RX ADMIN — FUROSEMIDE 40 MG: 10 INJECTION, SOLUTION INTRAMUSCULAR; INTRAVENOUS at 19:26

## 2023-12-27 RX ADMIN — SODIUM CHLORIDE: 9 INJECTION, SOLUTION INTRAVENOUS at 20:49

## 2023-12-27 RX ADMIN — PIPERACILLIN AND TAZOBACTAM 4500 MG: 4; .5 INJECTION, POWDER, FOR SOLUTION INTRAVENOUS at 17:20

## 2023-12-27 RX ADMIN — ENOXAPARIN SODIUM 30 MG: 100 INJECTION SUBCUTANEOUS at 22:33

## 2023-12-27 ASSESSMENT — PAIN DESCRIPTION - DESCRIPTORS
DESCRIPTORS: ACHING
DESCRIPTORS: ACHING

## 2023-12-27 ASSESSMENT — PAIN DESCRIPTION - ORIENTATION: ORIENTATION: UPPER

## 2023-12-27 ASSESSMENT — PAIN - FUNCTIONAL ASSESSMENT: PAIN_FUNCTIONAL_ASSESSMENT: 0-10

## 2023-12-27 ASSESSMENT — PAIN SCALES - GENERAL
PAINLEVEL_OUTOF10: 9
PAINLEVEL_OUTOF10: 10

## 2023-12-27 ASSESSMENT — LIFESTYLE VARIABLES
HOW OFTEN DO YOU HAVE A DRINK CONTAINING ALCOHOL: NEVER
HOW MANY STANDARD DRINKS CONTAINING ALCOHOL DO YOU HAVE ON A TYPICAL DAY: PATIENT DOES NOT DRINK

## 2023-12-27 ASSESSMENT — PAIN DESCRIPTION - LOCATION
LOCATION: BACK
LOCATION: BACK

## 2023-12-27 NOTE — ED TRIAGE NOTES
Patient arrives to ED via EMS. Patient flu positive x 1 week ago. Patient SOB. Patient went to PCP and was 80% on RA. Patient was initially placed on 4L via nc. Patient only 90% on the 4L so patient placed on CPAP in route with improvement.     In route:  Blood cultures x 1   Solumedrol  Rocephin  Albuterol

## 2023-12-27 NOTE — ED PROVIDER NOTES
Emergency Department Provider Note       PCP: Loida Neville   Age: 73 y.o.   Sex: male     DISPOSITION Decision To Admit 12/27/2023 07:18:26 PM       ICD-10-CM    1. Acute respiratory failure with hypoxia (HCC)  J96.01       2. Influenza A with respiratory manifestations  J10.1       3. Atrial fibrillation with RVR (HCC)  I48.91           Medical Decision Making     Complexity of Problems Addressed:  1 or more acute illnesses that pose a threat to life or bodily function.     Data Reviewed and Analyzed:   I independently ordered and reviewed each unique test.  I reviewed external records: previous lab results from outside ED.  I reviewed external records: previous imaging study including radiologist interpretation.       I independently ordered and interpreted the ED EKG in the absence of a Cardiologist.    Rate: 151  EKG Interpretation: EKG Interpretation: atrial fibrillation  ST Segments: Nonspecific ST segments - NO STEMI      I independently interpreted the cardiac monitor rhythm strip afib rapid.  I interpreted the X-rays bilateral infiltrates.    Discussion of management or test interpretation.  Patient coming in with decompensation after influenza.  He was very hypoxic and respiratory distress when he arrived on EMSs CPAP.  I converted him over to our BiPAP.  This did help him symptomatically over the next few hours.  He also was in atrial fibrillation with a rapid ventricular rate I gave him Cardizem bolus and put him on a Cardizem drip.  He also was treated for possible superimposed pneumonia.  He did appear somewhat fluid overloaded so I did not give much fluids.  I have also ordered potassium intravenously.  I discussed case with the intensivist who has come and seen the patient.  The patient was admitted and I have discussed patient management with the admitting provider.  The management of this patient was discussed with an external consultant.      Risk of Complications and/or Morbidity of Patient  PORTABLE    Narrative    Chest X-ray    INDICATION: Sepsis    COMPARISON: No priors    AP/PA view of the chest was obtained.    FINDINGS: Diffuse patchy opacifications are noted particularly in the bilateral  bases, right greater than left. The heart is enlarged.. Right shoulder  arthroplasty..        Impression    1. Bibasilar pneumonia, right greater than left..  2. Cardiomegaly     CBC with Auto Differential   Result Value Ref Range    WBC 14.2 (H) 4.3 - 11.1 K/uL    RBC 4.80 4.23 - 5.6 M/uL    Hemoglobin 14.8 13.6 - 17.2 g/dL    Hematocrit 42.0 41.1 - 50.3 %    MCV 87.5 82 - 102 FL    MCH 30.8 26.1 - 32.9 PG    MCHC 35.2 (H) 31.4 - 35.0 g/dL    RDW 13.0 11.9 - 14.6 %    Platelets 255 150 - 450 K/uL    MPV 10.1 9.4 - 12.3 FL    nRBC 0.00 0.0 - 0.2 K/uL    Neutrophils % 74 43 - 78 %    Lymphocytes % 12 (L) 13 - 44 %    Monocytes % 10 4.0 - 12.0 %    Eosinophils % 0 (L) 0.5 - 7.8 %    Basophils % 1 0.0 - 2.0 %    Immature Granulocytes 3 0.0 - 5.0 %    Neutrophils Absolute 10.6 (H) 1.7 - 8.2 K/UL    Lymphocytes Absolute 1.7 0.5 - 4.6 K/UL    Monocytes Absolute 1.4 (H) 0.1 - 1.3 K/UL    Eosinophils Absolute 0.0 0.0 - 0.8 K/UL    Basophils Absolute 0.1 0.0 - 0.2 K/UL    Absolute Immature Granulocyte 0.4 0.0 - 0.5 K/UL    RBC Comment SLIGHT  ANISOCYTOSIS + POIKILOCYTOSIS        RBC Comment NORMOCYTIC/NORMOCHROMIC      WBC Comment Result Confirmed By Smear      Platelet Comment ADEQUATE      Differential Type AUTOMATED     Lactate, Sepsis   Result Value Ref Range    Lactic Acid, Sepsis 1.7 0.4 - 2.0 MMOL/L   Procalcitonin   Result Value Ref Range    Procalcitonin 0.27 0.00 - 0.49 ng/mL   Brain Natriuretic Peptide   Result Value Ref Range    NT Pro-BNP 1,277 (H) 5 - 125 PG/ML   Magnesium   Result Value Ref Range    Magnesium 2.1 1.8 - 2.4 mg/dL   Comprehensive Metabolic Panel   Result Value Ref Range    Sodium 126 (L) 136 - 146 mmol/L    Potassium 2.8 (L) 3.5 - 5.1 mmol/L    Chloride 93 (L) 103 - 113 mmol/L    CO2 25 21

## 2023-12-28 PROBLEM — J10.1 INFLUENZA A WITH RESPIRATORY MANIFESTATIONS: Status: ACTIVE | Noted: 2023-12-28

## 2023-12-28 LAB
ANION GAP SERPL CALC-SCNC: 8 MMOL/L (ref 2–11)
ARTERIAL PATENCY WRIST A: POSITIVE
BASE EXCESS BLD CALC-SCNC: 0.2 MMOL/L
BASOPHILS # BLD: 0.1 K/UL (ref 0–0.2)
BASOPHILS # BLD: 0.1 K/UL (ref 0–0.2)
BASOPHILS NFR BLD: 1 % (ref 0–2)
BASOPHILS NFR BLD: 1 % (ref 0–2)
BDY SITE: ABNORMAL
BUN SERPL-MCNC: 16 MG/DL (ref 8–23)
CALCIUM SERPL-MCNC: 8.5 MG/DL (ref 8.3–10.4)
CHLORIDE SERPL-SCNC: 99 MMOL/L (ref 103–113)
CO2 SERPL-SCNC: 26 MMOL/L (ref 21–32)
CREAT SERPL-MCNC: 0.8 MG/DL (ref 0.8–1.5)
DIFFERENTIAL METHOD BLD: ABNORMAL
DIFFERENTIAL METHOD BLD: ABNORMAL
EKG ATRIAL RATE: 153 BPM
EKG DIAGNOSIS: NORMAL
EKG P AXIS: 0 DEGREES
EKG P-R INTERVAL: 63 MS
EKG Q-T INTERVAL: 323 MS
EKG QRS DURATION: 155 MS
EKG QTC CALCULATION (BAZETT): 512 MS
EKG R AXIS: 242 DEGREES
EKG T AXIS: 56 DEGREES
EKG VENTRICULAR RATE: 151 BPM
EOSINOPHIL # BLD: 0 K/UL (ref 0–0.8)
EOSINOPHIL # BLD: 0 K/UL (ref 0–0.8)
EOSINOPHIL NFR BLD: 0 % (ref 0.5–7.8)
EOSINOPHIL NFR BLD: 0 % (ref 0.5–7.8)
ERYTHROCYTE [DISTWIDTH] IN BLOOD BY AUTOMATED COUNT: 13 % (ref 11.9–14.6)
ERYTHROCYTE [DISTWIDTH] IN BLOOD BY AUTOMATED COUNT: 13.4 % (ref 11.9–14.6)
GAS FLOW.O2 O2 DELIVERY SYS: ABNORMAL
GLUCOSE SERPL-MCNC: 186 MG/DL (ref 65–100)
HCO3 BLD-SCNC: 24.1 MMOL/L (ref 22–26)
HCT VFR BLD AUTO: 38.3 % (ref 41.1–50.3)
HCT VFR BLD AUTO: 42 % (ref 41.1–50.3)
HGB BLD-MCNC: 13.4 G/DL (ref 13.6–17.2)
HGB BLD-MCNC: 14.8 G/DL (ref 13.6–17.2)
IMM GRANULOCYTES # BLD AUTO: 0.4 K/UL (ref 0–0.5)
IMM GRANULOCYTES # BLD AUTO: 0.7 K/UL (ref 0–0.5)
IMM GRANULOCYTES NFR BLD AUTO: 3 % (ref 0–5)
IMM GRANULOCYTES NFR BLD AUTO: 5 % (ref 0–5)
LYMPHOCYTES # BLD: 1.1 K/UL (ref 0.5–4.6)
LYMPHOCYTES # BLD: 1.7 K/UL (ref 0.5–4.6)
LYMPHOCYTES NFR BLD: 12 % (ref 13–44)
LYMPHOCYTES NFR BLD: 8 % (ref 13–44)
MCH RBC QN AUTO: 30.8 PG (ref 26.1–32.9)
MCH RBC QN AUTO: 31.2 PG (ref 26.1–32.9)
MCHC RBC AUTO-ENTMCNC: 35 G/DL (ref 31.4–35)
MCHC RBC AUTO-ENTMCNC: 35.2 G/DL (ref 31.4–35)
MCV RBC AUTO: 87.5 FL (ref 82–102)
MCV RBC AUTO: 89.1 FL (ref 82–102)
MONOCYTES # BLD: 0.7 K/UL (ref 0.1–1.3)
MONOCYTES # BLD: 1.4 K/UL (ref 0.1–1.3)
MONOCYTES NFR BLD: 10 % (ref 4–12)
MONOCYTES NFR BLD: 5 % (ref 4–12)
NEUTS SEG # BLD: 10.6 K/UL (ref 1.7–8.2)
NEUTS SEG # BLD: 11.2 K/UL (ref 1.7–8.2)
NEUTS SEG NFR BLD: 74 % (ref 43–78)
NEUTS SEG NFR BLD: 81 % (ref 43–78)
NRBC # BLD: 0 K/UL (ref 0–0.2)
NRBC # BLD: 0 K/UL (ref 0–0.2)
PCO2 BLD: 36.2 MMHG (ref 35–45)
PH BLD: 7.43 (ref 7.35–7.45)
PLATELET # BLD AUTO: 233 K/UL (ref 150–450)
PLATELET # BLD AUTO: 255 K/UL (ref 150–450)
PLATELET COMMENT: ADEQUATE
PLATELET COMMENT: ADEQUATE
PMV BLD AUTO: 10 FL (ref 9.4–12.3)
PMV BLD AUTO: 10.1 FL (ref 9.4–12.3)
PO2 BLD: 72 MMHG (ref 75–100)
POTASSIUM SERPL-SCNC: 3.7 MMOL/L (ref 3.5–5.1)
RBC # BLD AUTO: 4.3 M/UL (ref 4.23–5.6)
RBC # BLD AUTO: 4.8 M/UL (ref 4.23–5.6)
RBC MORPH BLD: ABNORMAL
SAO2 % BLD: 94.8 % (ref 95–98)
SERVICE CMNT-IMP: ABNORMAL
SERVICE CMNT-IMP: ABNORMAL
SODIUM SERPL-SCNC: 133 MMOL/L (ref 136–146)
SPECIMEN TYPE: ABNORMAL
WBC # BLD AUTO: 13.8 K/UL (ref 4.3–11.1)
WBC # BLD AUTO: 14.2 K/UL (ref 4.3–11.1)
WBC MORPH BLD: ABNORMAL
WBC MORPH BLD: ABNORMAL

## 2023-12-28 PROCEDURE — 2700000000 HC OXYGEN THERAPY PER DAY

## 2023-12-28 PROCEDURE — 36600 WITHDRAWAL OF ARTERIAL BLOOD: CPT

## 2023-12-28 PROCEDURE — 1100000000 HC RM PRIVATE

## 2023-12-28 PROCEDURE — 80048 BASIC METABOLIC PNL TOTAL CA: CPT

## 2023-12-28 PROCEDURE — 82803 BLOOD GASES ANY COMBINATION: CPT

## 2023-12-28 PROCEDURE — 1100000003 HC PRIVATE W/ TELEMETRY

## 2023-12-28 PROCEDURE — 6360000002 HC RX W HCPCS

## 2023-12-28 PROCEDURE — 6360000002 HC RX W HCPCS: Performed by: INTERNAL MEDICINE

## 2023-12-28 PROCEDURE — 2500000003 HC RX 250 WO HCPCS: Performed by: EMERGENCY MEDICINE

## 2023-12-28 PROCEDURE — 99233 SBSQ HOSP IP/OBS HIGH 50: CPT | Performed by: INTERNAL MEDICINE

## 2023-12-28 PROCEDURE — 85025 COMPLETE CBC W/AUTO DIFF WBC: CPT

## 2023-12-28 PROCEDURE — 2500000003 HC RX 250 WO HCPCS: Performed by: INTERNAL MEDICINE

## 2023-12-28 PROCEDURE — 2580000003 HC RX 258

## 2023-12-28 PROCEDURE — 2580000003 HC RX 258: Performed by: EMERGENCY MEDICINE

## 2023-12-28 PROCEDURE — 6370000000 HC RX 637 (ALT 250 FOR IP)

## 2023-12-28 PROCEDURE — 87641 MR-STAPH DNA AMP PROBE: CPT

## 2023-12-28 PROCEDURE — 6370000000 HC RX 637 (ALT 250 FOR IP): Performed by: INTERNAL MEDICINE

## 2023-12-28 PROCEDURE — 2500000003 HC RX 250 WO HCPCS

## 2023-12-28 PROCEDURE — 99291 CRITICAL CARE FIRST HOUR: CPT

## 2023-12-28 PROCEDURE — 93010 ELECTROCARDIOGRAM REPORT: CPT | Performed by: INTERNAL MEDICINE

## 2023-12-28 PROCEDURE — 94640 AIRWAY INHALATION TREATMENT: CPT

## 2023-12-28 PROCEDURE — 94760 N-INVAS EAR/PLS OXIMETRY 1: CPT

## 2023-12-28 RX ORDER — ZINC SULFATE 50(220)MG
50 CAPSULE ORAL
Status: DISCONTINUED | OUTPATIENT
Start: 2023-12-28 | End: 2023-12-31 | Stop reason: HOSPADM

## 2023-12-28 RX ORDER — VITAMIN B COMPLEX
1000 TABLET ORAL
Status: DISCONTINUED | OUTPATIENT
Start: 2023-12-28 | End: 2023-12-31 | Stop reason: HOSPADM

## 2023-12-28 RX ORDER — LISINOPRIL AND HYDROCHLOROTHIAZIDE 25; 20 MG/1; MG/1
1 TABLET ORAL DAILY
Status: DISCONTINUED | OUTPATIENT
Start: 2023-12-28 | End: 2023-12-31 | Stop reason: HOSPADM

## 2023-12-28 RX ORDER — ALBUTEROL SULFATE 2.5 MG/3ML
2.5 SOLUTION RESPIRATORY (INHALATION)
Status: DISCONTINUED | OUTPATIENT
Start: 2023-12-28 | End: 2023-12-31 | Stop reason: HOSPADM

## 2023-12-28 RX ORDER — PREDNISONE 20 MG/1
40 TABLET ORAL DAILY
Status: DISCONTINUED | OUTPATIENT
Start: 2023-12-28 | End: 2023-12-31 | Stop reason: HOSPADM

## 2023-12-28 RX ORDER — TAMSULOSIN HYDROCHLORIDE 0.4 MG/1
0.4 CAPSULE ORAL DAILY
Status: DISCONTINUED | OUTPATIENT
Start: 2023-12-28 | End: 2023-12-31 | Stop reason: HOSPADM

## 2023-12-28 RX ORDER — OMEGA-3-ACID ETHYL ESTERS 1 G/1
1000 CAPSULE, LIQUID FILLED ORAL 3 TIMES DAILY
Status: DISCONTINUED | OUTPATIENT
Start: 2023-12-28 | End: 2023-12-31 | Stop reason: HOSPADM

## 2023-12-28 RX ORDER — TIZANIDINE 2 MG/1
2 TABLET ORAL 3 TIMES DAILY
Status: DISCONTINUED | OUTPATIENT
Start: 2023-12-28 | End: 2023-12-31 | Stop reason: HOSPADM

## 2023-12-28 RX ORDER — FENOFIBRATE 54 MG/1
54 TABLET ORAL DAILY
Status: DISCONTINUED | OUTPATIENT
Start: 2023-12-28 | End: 2023-12-31 | Stop reason: HOSPADM

## 2023-12-28 RX ORDER — LANOLIN ALCOHOL/MO/W.PET/CERES
400 CREAM (GRAM) TOPICAL DAILY
Status: DISCONTINUED | OUTPATIENT
Start: 2023-12-28 | End: 2023-12-31 | Stop reason: HOSPADM

## 2023-12-28 RX ORDER — MULTIVITAMIN/IRON/FOLIC ACID 18MG-0.4MG
1500 TABLET ORAL DAILY
Status: DISCONTINUED | OUTPATIENT
Start: 2023-12-28 | End: 2023-12-31 | Stop reason: HOSPADM

## 2023-12-28 RX ORDER — SODIUM CHLORIDE FOR INHALATION 3 %
4 VIAL, NEBULIZER (ML) INHALATION PRN
Status: DISCONTINUED | OUTPATIENT
Start: 2023-12-28 | End: 2023-12-31 | Stop reason: HOSPADM

## 2023-12-28 RX ORDER — MULTIVIT-MIN/IRON FUM/FOLIC AC 7.5 MG-4
1 TABLET ORAL DAILY
Status: DISCONTINUED | OUTPATIENT
Start: 2023-12-28 | End: 2023-12-31 | Stop reason: HOSPADM

## 2023-12-28 RX ORDER — LANOLIN ALCOHOL/MO/W.PET/CERES
6 CREAM (GRAM) TOPICAL ONCE
Status: COMPLETED | OUTPATIENT
Start: 2023-12-28 | End: 2023-12-29

## 2023-12-28 RX ORDER — BUDESONIDE 0.5 MG/2ML
0.5 INHALANT ORAL
Status: DISCONTINUED | OUTPATIENT
Start: 2023-12-28 | End: 2023-12-31 | Stop reason: HOSPADM

## 2023-12-28 RX ORDER — ASCORBIC ACID 500 MG
500 TABLET ORAL DAILY
Status: DISCONTINUED | OUTPATIENT
Start: 2023-12-28 | End: 2023-12-31 | Stop reason: HOSPADM

## 2023-12-28 RX ORDER — PANTOPRAZOLE SODIUM 40 MG/1
40 TABLET, DELAYED RELEASE ORAL
Status: DISCONTINUED | OUTPATIENT
Start: 2023-12-29 | End: 2023-12-31 | Stop reason: HOSPADM

## 2023-12-28 RX ORDER — METOPROLOL SUCCINATE 50 MG/1
50 TABLET, EXTENDED RELEASE ORAL DAILY
Status: DISCONTINUED | OUTPATIENT
Start: 2023-12-28 | End: 2023-12-31 | Stop reason: HOSPADM

## 2023-12-28 RX ORDER — HYDROMORPHONE HYDROCHLORIDE 1 MG/ML
1 INJECTION, SOLUTION INTRAMUSCULAR; INTRAVENOUS; SUBCUTANEOUS ONCE
Status: COMPLETED | OUTPATIENT
Start: 2023-12-28 | End: 2023-12-28

## 2023-12-28 RX ADMIN — PREDNISONE 40 MG: 20 TABLET ORAL at 17:40

## 2023-12-28 RX ADMIN — ALBUTEROL SULFATE 2.5 MG: 2.5 SOLUTION RESPIRATORY (INHALATION) at 21:33

## 2023-12-28 RX ADMIN — POTASSIUM CHLORIDE 10 MEQ: 7.46 INJECTION, SOLUTION INTRAVENOUS at 01:42

## 2023-12-28 RX ADMIN — TUBERCULIN PURIFIED PROTEIN DERIVATIVE 5 UNITS: 5 INJECTION, SOLUTION INTRADERMAL at 17:44

## 2023-12-28 RX ADMIN — POTASSIUM CHLORIDE 10 MEQ: 7.46 INJECTION, SOLUTION INTRAVENOUS at 02:55

## 2023-12-28 RX ADMIN — WATER 1000 MG: 1 INJECTION INTRAMUSCULAR; INTRAVENOUS; SUBCUTANEOUS at 08:59

## 2023-12-28 RX ADMIN — BUDESONIDE INHALATION 500 MCG: 0.5 SUSPENSION RESPIRATORY (INHALATION) at 21:34

## 2023-12-28 RX ADMIN — ALBUTEROL SULFATE 2.5 MG: 2.5 SOLUTION RESPIRATORY (INHALATION) at 17:11

## 2023-12-28 RX ADMIN — SODIUM CHLORIDE 15 MG/HR: 900 INJECTION, SOLUTION INTRAVENOUS at 00:44

## 2023-12-28 RX ADMIN — HYDROMORPHONE HYDROCHLORIDE 1 MG: 1 INJECTION, SOLUTION INTRAMUSCULAR; INTRAVENOUS; SUBCUTANEOUS at 01:29

## 2023-12-28 RX ADMIN — SODIUM CHLORIDE, PRESERVATIVE FREE 5 ML: 5 INJECTION INTRAVENOUS at 20:07

## 2023-12-28 RX ADMIN — ENOXAPARIN SODIUM 30 MG: 100 INJECTION SUBCUTANEOUS at 08:59

## 2023-12-28 RX ADMIN — POTASSIUM CHLORIDE 10 MEQ: 7.46 INJECTION, SOLUTION INTRAVENOUS at 04:38

## 2023-12-28 RX ADMIN — ALBUTEROL SULFATE 2.5 MG: 2.5 SOLUTION RESPIRATORY (INHALATION) at 11:11

## 2023-12-28 RX ADMIN — RIVAROXABAN 20 MG: 20 TABLET, FILM COATED ORAL at 17:40

## 2023-12-28 RX ADMIN — BUDESONIDE INHALATION 500 MCG: 0.5 SUSPENSION RESPIRATORY (INHALATION) at 10:00

## 2023-12-28 RX ADMIN — AZITHROMYCIN MONOHYDRATE 250 MG: 500 INJECTION, POWDER, LYOPHILIZED, FOR SOLUTION INTRAVENOUS at 09:01

## 2023-12-28 RX ADMIN — POTASSIUM CHLORIDE 10 MEQ: 7.46 INJECTION, SOLUTION INTRAVENOUS at 00:32

## 2023-12-28 ASSESSMENT — PAIN SCALES - GENERAL
PAINLEVEL_OUTOF10: 0
PAINLEVEL_OUTOF10: 7
PAINLEVEL_OUTOF10: 0

## 2023-12-28 NOTE — H&P
[facility] Critical Care Note:: 12/27/2023  Benito Barber  Admission Date: 12/27/2023     Length of Stay: 0 days    Background: 73 y.o. male with HTN, HLD, PAF (Xarelto and metoprolol), DISH, FORTINO, and history of pulmonary thromboembolism in the distant past.  Patient started feeling ill a week ago Wednesday he was diagnosed as an outpatient with influenza A on Friday over the weekend and up to his presentation to the ED he was having increasing shortness of breath and exertional dyspnea.  Patient was found to be hypoxic and was requiring NIV/BiPAP.  Patient was a longtime smoker but has not smoked in 10 years.  Patient did use CPAP at home until he lost weight and is since come off of the BiPAP.  Wife states he has regained the weight and may need to be back on her CPAP at night.  Patient's been having intermittent fever with gray sputum production.    Notable PMH:  has a past medical history of Arthritis, Atrial fibrillation (HCC), Cancer (HCC), DISH (diffuse idiopathic skeletal hyperostosis), Hypertension, FORTINO (obstructive sleep apnea), Osteoporosis, PE (pulmonary thromboembolism) (HCC), Sleep apnea, SOB (shortness of breath) on exertion, and Thromboembolus (HCC).    24 Hour events:   Patient diagnosed with the flu in the last 24 hours increasing shortness of breath and exertional dyspnea requiring NIV/BiPAP due to hypoxia.    Review of Systems: Comprehensive ROS negative except in HPI    Lines: (insertion date)      Drips: current dose (range)        Pertinent Exam:         Blood pressure (!) 160/58, pulse (!) 103, temperature 99.4 °F (37.4 °C), temperature source Axillary, resp. rate 24, height 1.778 m (5' 10\"), weight 122.5 kg (270 lb), SpO2 94 %.   Intake/Output Summary (Last 24 hours) at 12/27/2023 2055  Last data filed at 12/27/2023 2050  Gross per 24 hour   Intake 541.4 ml   Output --   Net 541.4 ml     Constitutional: Awake alert oriented no distress while on BiPAP.  EENMT:  Sclera clear,

## 2023-12-28 NOTE — CONSULTS
Hospitalist History and Physical   Admit Date:  2023  4:37 PM   Name:  Benito Barber   Age:  73 y.o.  Sex:  male  :  1950   MRN:  [id number]   Room:  Formerly named [facility]    Presenting/Chief Complaint: Shortness of Breath     Reason(s) for Admission: Influenza A with respiratory manifestations [J10.1]  Atrial fibrillation with RVR (HCC) [I48.91]  Acute respiratory failure with hypoxia (HCC) [J96.01]  Acute on chronic respiratory failure with hypoxia (HCC) [J96.21]     History of Present Illness:       Benito Barber is a 73 y.o. male with medical history of HTN, HLP, DVT on xarelto, DISH, OA admitted with flu positive since 23 and subsequent bacterial pneumonia with acute hypoxic respiratory failure.       Placed on BIPAP in ED  Admitted to pulmonary  Weaned to NC      CXR\"  IMPRESSION:  1. Bibasilar pneumonia, right greater than left..  2. Cardiomegaly   \"    He is on a course of rocephin and azithromycin    No tamiflu due to duration of illness      Discharge plans pending to home with wife      Assessment & Plan:     Principal Problem:    Acute on chronic respiratory failure with hypoxia (HCC)  Plan:    Influenza A with respiratory manifestations  Bacterial pneumonia   Plan:   Weaned to 7 L NC  D2 rocephin and azithromycin  Add prednisone 40 mg daily   Continued nebs   Add diet          Active Problems:    FORTINO (obstructive sleep apnea)  Plan:   Noted          Paroxysmal atrial fibrillation (HCC)  Plan:   Resume metoprolol   On xarelto -resume         DVT:  Resume xarelto       Hyponatremia:  Trend  --> 133        Hyperglycemia:  Followup glucoses       PT/OT evals and PPD needed/ordered?  Yes  Diet: ADULT DIET; Regular; Low Fat/Low Chol/High Fiber/BRY  VTE prophylaxis: Already on anticoagulation  Code status: Full Code      Non-peripheral Lines and Tubes (if present):             Hospital Problems:  Principal Problem:    Acute on chronic respiratory failure with hypoxia (HCC)  Active Problems:     CO2 26 21 - 32 mmol/L    Anion Gap 8 2 - 11 mmol/L    Glucose 186 (H) 65 - 100 mg/dL    BUN 16 8 - 23 MG/DL    Creatinine 0.80 0.8 - 1.5 MG/DL    Est, Glom Filt Rate >60 >60 ml/min/1.73m2    Calcium 8.5 8.3 - 10.4 MG/DL   CBC with Auto Differential    Collection Time: 12/28/23  6:16 AM   Result Value Ref Range    WBC 13.8 (H) 4.3 - 11.1 K/uL    RBC 4.30 4.23 - 5.6 M/uL    Hemoglobin 13.4 (L) 13.6 - 17.2 g/dL    Hematocrit 38.3 (L) 41.1 - 50.3 %    MCV 89.1 82 - 102 FL    MCH 31.2 26.1 - 32.9 PG    MCHC 35.0 31.4 - 35.0 g/dL    RDW 13.4 11.9 - 14.6 %    Platelets 233 150 - 450 K/uL    MPV 10.0 9.4 - 12.3 FL    nRBC 0.00 0.0 - 0.2 K/uL    Neutrophils % 81 (H) 43 - 78 %    Lymphocytes % 8 (L) 13 - 44 %    Monocytes % 5 4.0 - 12.0 %    Eosinophils % 0 (L) 0.5 - 7.8 %    Basophils % 1 0.0 - 2.0 %    Immature Granulocytes 5 0.0 - 5.0 %    Neutrophils Absolute 11.2 (H) 1.7 - 8.2 K/UL    Lymphocytes Absolute 1.1 0.5 - 4.6 K/UL    Monocytes Absolute 0.7 0.1 - 1.3 K/UL    Eosinophils Absolute 0.0 0.0 - 0.8 K/UL    Basophils Absolute 0.1 0.0 - 0.2 K/UL    Absolute Immature Granulocyte 0.7 (H) 0.0 - 0.5 K/UL    RBC Comment NORMOCYTIC/NORMOCHROMIC      WBC Comment OCCASIONAL      Platelet Comment ADEQUATE      Differential Type AUTOMATED         I have personally reviewed imaging studies:  XR CHEST PORTABLE    Result Date: 12/27/2023  Chest X-ray INDICATION: Sepsis COMPARISON: No priors AP/PA view of the chest was obtained. FINDINGS: Diffuse patchy opacifications are noted particularly in the bilateral bases, right greater than left. The heart is enlarged.. Right shoulder arthroplasty..      1. Bibasilar pneumonia, right greater than left.. 2. Cardiomegaly         Signed:  Tiffanie Sigala MD    Part of this note may have been written by using a voice dictation software.  The note has been proof read but may still contain some grammatical/other typographical errors.

## 2023-12-28 NOTE — PROGRESS NOTES
The patient is critically ill with respiratory failure, circulatory failure and requires high complexity decision making for assessment and support including frequent ventilator adjustment , frequent evaluation and titration of therapies , application of advanced monitoring technologies and extensive interpretation of multiple databases    Cumulative time devoted to patient care services by me for day of service -70 min     Aaron Maldonado MD

## 2023-12-28 NOTE — PROGRESS NOTES
Patient was admitted to the ICU service however awaiting bed in the ED.  Patient was able to come off of BiPAP and go to nasal cannula oxygen was SaO2 staying above 93%.  Will transition from intensive care to remote telemetry patient had been on diltiazem for A-fib RVR rate is now controlled in the 70s diltiazem has been discontinued.  Will ask hospitalist to assume care of the patient.

## 2023-12-28 NOTE — ED NOTES
TRANSFER - OUT REPORT:    Verbal report given to Suzanne MEIER on Benito Barber  being transferred to 7th floor for routine progression of patient care       Report consisted of patient's Situation, Background, Assessment and   Recommendations(SBAR).     Information from the following report(s) Nurse Handoff Report and ED SBAR was reviewed with the receiving nurse.    Miguel Fall Assessment:    Presents to emergency department  because of falls (Syncope, seizure, or loss of consciousness): No  Age > 70: Yes  Altered Mental Status, Intoxication with alcohol or substance confusion (Disorientation, impaired judgment, poor safety awaremess, or inability to follow instructions): No  Impaired Mobility: Ambulates or transfers with assistive devices or assistance; Unable to ambulate or transer.: No  Nursing Judgement: Yes          Lines:   Peripheral IV 12/27/23 Right Antecubital (Active)   Site Assessment Clean, dry & intact 12/28/23 0143   Line Status Blood return noted;Flushed;Normal saline locked 12/27/23 1710   Phlebitis Assessment No symptoms 12/28/23 0143   Infiltration Assessment 0 12/28/23 0143   Dressing Status Clean, dry & intact 12/27/23 1710   Dressing Type Transparent 12/27/23 1710        Opportunity for questions and clarification was provided.      Patient transported with:  Transport            Sisi Amaya RN  12/28/23 4464

## 2023-12-28 NOTE — PROGRESS NOTES
Physician Progress Note      PATIENT:               WARREN WINTERS  CSN #:                  [id number]  :                       1950  ADMIT DATE:       2023 4:37 PM  DISCH DATE:  RESPONDING  PROVIDER #:        Sis Crisostomo MD          QUERY TEXT:    Patient admitted with acute resp failure and pneumonia, noted to have   paroysmal atrial fibrillation and is maintained on Eliquis. If possible,   please document in progress notes and discharge summary if you are evaluating   and/or treating any of the following:    The medical record reflects the following:  Risk Factors: 73 yr old, hx of hypertension.  Clinical Indicators: paroxysmal atrial fibrillation  Treatment: Xarelto  Options provided:  -- Secondary hypercoagulable state in a patient with atrial fibrillation  -- Other - I will add my own diagnosis  -- Disagree - Not applicable / Not valid  -- Disagree - Clinically unable to determine / Unknown  -- Refer to Clinical Documentation Reviewer    PROVIDER RESPONSE TEXT:    This patient has secondary hypercoagulable state in a patient with atrial   fibrillation.    Query created by: Adele Elizondo on 2023 1:35 PM      Electronically signed by:  Sis Crisostomo MD 2023 1:38 PM

## 2023-12-28 NOTE — PROGRESS NOTES
Benito Barber  Admission Date: 12/27/2023         Daily Progress Note: 12/28/2023    The patient's chart is reviewed and the patient is discussed with the staff.    Background: 73 y.o. male with HTN, HLD, PAF (Xarelto and metoprolol), DISH, FORTINO, and history of pulmonary thromboembolism in the distant past.  Patient started feeling ill a week ago Wednesday he was diagnosed as an outpatient with influenza A on Friday over the weekend and up to his presentation to the ED he was having increasing shortness of breath and exertional dyspnea.  Patient was found to be hypoxic and was requiring NIV/BiPAP.  Patient was a longtime smoker but has not smoked in 10 years.  Patient did use CPAP at home until he lost weight and is since come off of the BiPAP.  Wife states he has regained the weight and may need to be back on her CPAP at night. Patient's been having intermittent fever with gray sputum production.     Subjective:     Initially on BIPAP in ED, but has been off all night. Now looks comfortable. He is still on 8L NC and reports feeling congested, but having difficulty clearing sputum. Reports he smoked in the distant past, but no known pulmonary issues. He reports he used CPAP many years ago, but stopped drinking.     Current Facility-Administered Medications   Medication Dose Route Frequency    [Held by provider] ascorbic acid (VITAMIN C) tablet 500 mg  500 mg Oral Daily    [Held by provider] lisinopril-hydroCHLOROthiazide (PRINZIDE;ZESTORETIC) 20-25 MG per tablet 1 tablet  1 tablet Oral Daily    [Held by provider] rivaroxaban (XARELTO) tablet 20 mg  20 mg Oral Dinner    [Held by provider] metoprolol succinate (TOPROL XL) extended release tablet 50 mg  50 mg Oral Daily    [Held by provider] tamsulosin (FLOMAX) capsule 0.4 mg  0.4 mg Oral Daily    [Held by provider] omega-3 acid ethyl esters (LOVAZA) capsule 1,000 mg  1,000 mg Oral TID    [Held by provider] magnesium oxide (MAG-OX) tablet 400 mg  400 mg  polyethylene glycol (GLYCOLAX) packet 17 g  17 g Oral Daily PRN    acetaminophen (TYLENOL) tablet 650 mg  650 mg Oral Q6H PRN    Or    acetaminophen (TYLENOL) suppository 650 mg  650 mg Rectal Q6H PRN    albuterol (PROVENTIL) (2.5 MG/3ML) 0.083% nebulizer solution 2.5 mg  2.5 mg Nebulization Q4H PRN     Current Outpatient Medications   Medication Sig    Glucosamine-Chondroitin 2887-9650 MG/30ML LIQD Take by mouth    gabapentin (NEURONTIN) 100 MG capsule Take 1 capsule by mouth 3 times daily for 30 days.    Multiple Vitamins-Minerals (MULTIVITAMIN WITH MINERALS) tablet Take 1 tablet by mouth daily    Zinc 22.5 MG TABS Take 1 tablet by mouth daily.    vitamin D (CHOLECALCIFEROL) 25 MCG (1000 UT) TABS tablet Take 1 tablet by mouth daily    gabapentin (NEURONTIN) 100 MG capsule Take 1 capsule by mouth in the morning and 1 capsule at noon and 1 capsule before bedtime. Do all this for 30 days.    chlorzoxazone (PARAFON FORTE) 500 MG tablet Take 1 tablet by mouth nightly (Patient not taking: Reported on 12/27/2023)    rivaroxaban (XARELTO) 20 MG TABS tablet Take 1 tablet by mouth Daily with supper    metoprolol succinate (TOPROL XL) 50 MG extended release tablet Take 1 tablet by mouth daily    tamsulosin (FLOMAX) 0.4 MG capsule Take 0.4 mg by mouth daily (Patient not taking: Reported on 12/27/2023)    fenofibrate (TRICOR) 145 MG tablet Take 1 tablet by mouth daily    Omega-3 Fatty Acids (FISH OIL) 1000 MG CAPS Take 3 capsules by mouth 3 times daily    magnesium oxide (MAG-OX) 400 MG tablet Take 1 tablet by mouth daily    POTASSIUM PO Take 1 tablet by mouth daily     ascorbic acid (VITAMIN C) 500 MG tablet Take 1 tablet by mouth daily    lisinopril-hydroCHLOROthiazide (PRINZIDE;ZESTORETIC) 20-25 MG per tablet Take 1 tablet by mouth daily     Review of Systems: Comprehensive ROS negative except in HPI  Objective:   Blood pressure 117/72, pulse 81, temperature 99.4 °F (37.4 °C), temperature source Axillary, resp. rate 27,

## 2023-12-28 NOTE — ED NOTES
MD Maldonado notified of pts hr in the 70s. Advised to titrate diltiazem off at this time.      Emmy Escoto, RN  12/28/23 0448

## 2023-12-29 PROBLEM — I48.91 ATRIAL FIBRILLATION WITH RVR (HCC): Status: ACTIVE | Noted: 2023-12-29

## 2023-12-29 PROBLEM — J96.01 ACUTE RESPIRATORY FAILURE WITH HYPOXIA (HCC): Status: ACTIVE | Noted: 2023-12-27

## 2023-12-29 LAB
ANION GAP SERPL CALC-SCNC: 5 MMOL/L (ref 2–11)
BASOPHILS # BLD: 0.2 K/UL (ref 0–0.2)
BASOPHILS NFR BLD: 1 % (ref 0–2)
BUN SERPL-MCNC: 19 MG/DL (ref 8–23)
CALCIUM SERPL-MCNC: 8.8 MG/DL (ref 8.3–10.4)
CHLORIDE SERPL-SCNC: 102 MMOL/L (ref 103–113)
CO2 SERPL-SCNC: 28 MMOL/L (ref 21–32)
CREAT SERPL-MCNC: 0.7 MG/DL (ref 0.8–1.5)
DIFFERENTIAL METHOD BLD: ABNORMAL
EKG DIAGNOSIS: NORMAL
EKG Q-T INTERVAL: 416 MS
EKG QRS DURATION: 156 MS
EKG QTC CALCULATION (BAZETT): 562 MS
EKG R AXIS: 263 DEGREES
EKG T AXIS: 16 DEGREES
EKG VENTRICULAR RATE: 110 BPM
EOSINOPHIL # BLD: 0 K/UL (ref 0–0.8)
EOSINOPHIL NFR BLD: 0 % (ref 0.5–7.8)
ERYTHROCYTE [DISTWIDTH] IN BLOOD BY AUTOMATED COUNT: 13.4 % (ref 11.9–14.6)
EST. AVERAGE GLUCOSE BLD GHB EST-MCNC: 128 MG/DL
GLUCOSE BLD STRIP.AUTO-MCNC: 209 MG/DL (ref 65–100)
GLUCOSE BLD STRIP.AUTO-MCNC: 236 MG/DL (ref 65–100)
GLUCOSE SERPL-MCNC: 207 MG/DL (ref 65–100)
HBA1C MFR BLD: 6.1 % (ref 4.8–5.6)
HCT VFR BLD AUTO: 40.4 % (ref 41.1–50.3)
HGB BLD-MCNC: 13.8 G/DL (ref 13.6–17.2)
IMM GRANULOCYTES # BLD AUTO: 1 K/UL (ref 0–0.5)
IMM GRANULOCYTES NFR BLD AUTO: 6 % (ref 0–5)
LYMPHOCYTES # BLD: 1.2 K/UL (ref 0.5–4.6)
LYMPHOCYTES NFR BLD: 7 % (ref 13–44)
MCH RBC QN AUTO: 31 PG (ref 26.1–32.9)
MCHC RBC AUTO-ENTMCNC: 34.2 G/DL (ref 31.4–35)
MCV RBC AUTO: 90.8 FL (ref 82–102)
MONOCYTES # BLD: 1 K/UL (ref 0.1–1.3)
MONOCYTES NFR BLD: 6 % (ref 4–12)
MRSA DNA SPEC QL NAA+PROBE: NOT DETECTED
NEUTS SEG # BLD: 13.8 K/UL (ref 1.7–8.2)
NEUTS SEG NFR BLD: 80 % (ref 43–78)
NRBC # BLD: 0 K/UL (ref 0–0.2)
PLATELET # BLD AUTO: 274 K/UL (ref 150–450)
PLATELET COMMENT: ADEQUATE
PMV BLD AUTO: 10.1 FL (ref 9.4–12.3)
POTASSIUM SERPL-SCNC: 3.4 MMOL/L (ref 3.5–5.1)
RBC # BLD AUTO: 4.45 M/UL (ref 4.23–5.6)
RBC MORPH BLD: ABNORMAL
S AUREUS CPE NOSE QL NAA+PROBE: NOT DETECTED
SERVICE CMNT-IMP: ABNORMAL
SERVICE CMNT-IMP: ABNORMAL
SODIUM SERPL-SCNC: 135 MMOL/L (ref 136–146)
WBC # BLD AUTO: 17.2 K/UL (ref 4.3–11.1)
WBC MORPH BLD: ABNORMAL

## 2023-12-29 PROCEDURE — 87205 SMEAR GRAM STAIN: CPT

## 2023-12-29 PROCEDURE — 94640 AIRWAY INHALATION TREATMENT: CPT

## 2023-12-29 PROCEDURE — 6370000000 HC RX 637 (ALT 250 FOR IP): Performed by: NURSE PRACTITIONER

## 2023-12-29 PROCEDURE — 83036 HEMOGLOBIN GLYCOSYLATED A1C: CPT

## 2023-12-29 PROCEDURE — 85025 COMPLETE CBC W/AUTO DIFF WBC: CPT

## 2023-12-29 PROCEDURE — 1100000000 HC RM PRIVATE

## 2023-12-29 PROCEDURE — 6370000000 HC RX 637 (ALT 250 FOR IP): Performed by: INTERNAL MEDICINE

## 2023-12-29 PROCEDURE — 97161 PT EVAL LOW COMPLEX 20 MIN: CPT

## 2023-12-29 PROCEDURE — 6370000000 HC RX 637 (ALT 250 FOR IP)

## 2023-12-29 PROCEDURE — 6370000000 HC RX 637 (ALT 250 FOR IP): Performed by: STUDENT IN AN ORGANIZED HEALTH CARE EDUCATION/TRAINING PROGRAM

## 2023-12-29 PROCEDURE — 6360000002 HC RX W HCPCS

## 2023-12-29 PROCEDURE — 36415 COLL VENOUS BLD VENIPUNCTURE: CPT

## 2023-12-29 PROCEDURE — 82962 GLUCOSE BLOOD TEST: CPT

## 2023-12-29 PROCEDURE — 6360000002 HC RX W HCPCS: Performed by: INTERNAL MEDICINE

## 2023-12-29 PROCEDURE — 97530 THERAPEUTIC ACTIVITIES: CPT

## 2023-12-29 PROCEDURE — 87070 CULTURE OTHR SPECIMN AEROBIC: CPT

## 2023-12-29 PROCEDURE — 80048 BASIC METABOLIC PNL TOTAL CA: CPT

## 2023-12-29 PROCEDURE — 2700000000 HC OXYGEN THERAPY PER DAY

## 2023-12-29 PROCEDURE — 1100000003 HC PRIVATE W/ TELEMETRY

## 2023-12-29 PROCEDURE — 99232 SBSQ HOSP IP/OBS MODERATE 35: CPT | Performed by: INTERNAL MEDICINE

## 2023-12-29 PROCEDURE — 6370000000 HC RX 637 (ALT 250 FOR IP): Performed by: FAMILY MEDICINE

## 2023-12-29 PROCEDURE — 2580000003 HC RX 258

## 2023-12-29 PROCEDURE — 93010 ELECTROCARDIOGRAM REPORT: CPT | Performed by: INTERNAL MEDICINE

## 2023-12-29 PROCEDURE — 93005 ELECTROCARDIOGRAM TRACING: CPT | Performed by: INTERNAL MEDICINE

## 2023-12-29 RX ORDER — GUAIFENESIN 600 MG/1
1200 TABLET, EXTENDED RELEASE ORAL 2 TIMES DAILY
Status: DISCONTINUED | OUTPATIENT
Start: 2023-12-29 | End: 2023-12-31 | Stop reason: HOSPADM

## 2023-12-29 RX ORDER — DEXTROSE MONOHYDRATE 100 MG/ML
INJECTION, SOLUTION INTRAVENOUS CONTINUOUS PRN
Status: DISCONTINUED | OUTPATIENT
Start: 2023-12-29 | End: 2023-12-31 | Stop reason: HOSPADM

## 2023-12-29 RX ORDER — INSULIN LISPRO 100 [IU]/ML
0-4 INJECTION, SOLUTION INTRAVENOUS; SUBCUTANEOUS
Status: DISCONTINUED | OUTPATIENT
Start: 2023-12-29 | End: 2023-12-31 | Stop reason: HOSPADM

## 2023-12-29 RX ORDER — POTASSIUM CHLORIDE 20 MEQ/1
40 TABLET, EXTENDED RELEASE ORAL ONCE
Status: COMPLETED | OUTPATIENT
Start: 2023-12-29 | End: 2023-12-29

## 2023-12-29 RX ORDER — INSULIN LISPRO 100 [IU]/ML
0-4 INJECTION, SOLUTION INTRAVENOUS; SUBCUTANEOUS NIGHTLY
Status: DISCONTINUED | OUTPATIENT
Start: 2023-12-29 | End: 2023-12-31 | Stop reason: HOSPADM

## 2023-12-29 RX ADMIN — BUDESONIDE INHALATION 500 MCG: 0.5 SUSPENSION RESPIRATORY (INHALATION) at 20:18

## 2023-12-29 RX ADMIN — ALBUTEROL SULFATE 2.5 MG: 2.5 SOLUTION RESPIRATORY (INHALATION) at 11:25

## 2023-12-29 RX ADMIN — SODIUM CHLORIDE, PRESERVATIVE FREE 5 ML: 5 INJECTION INTRAVENOUS at 20:33

## 2023-12-29 RX ADMIN — WATER 1000 MG: 1 INJECTION INTRAMUSCULAR; INTRAVENOUS; SUBCUTANEOUS at 08:44

## 2023-12-29 RX ADMIN — RIVAROXABAN 20 MG: 20 TABLET, FILM COATED ORAL at 18:17

## 2023-12-29 RX ADMIN — SODIUM CHLORIDE, PRESERVATIVE FREE 10 ML: 5 INJECTION INTRAVENOUS at 08:39

## 2023-12-29 RX ADMIN — ALBUTEROL SULFATE 2.5 MG: 2.5 SOLUTION RESPIRATORY (INHALATION) at 20:18

## 2023-12-29 RX ADMIN — BUDESONIDE INHALATION 500 MCG: 0.5 SUSPENSION RESPIRATORY (INHALATION) at 07:47

## 2023-12-29 RX ADMIN — POTASSIUM CHLORIDE 40 MEQ: 1500 TABLET, EXTENDED RELEASE ORAL at 18:17

## 2023-12-29 RX ADMIN — Medication 6 MG: at 00:17

## 2023-12-29 RX ADMIN — ALBUTEROL SULFATE 2.5 MG: 2.5 SOLUTION RESPIRATORY (INHALATION) at 02:07

## 2023-12-29 RX ADMIN — PANTOPRAZOLE SODIUM 40 MG: 40 TABLET, DELAYED RELEASE ORAL at 05:03

## 2023-12-29 RX ADMIN — AZITHROMYCIN MONOHYDRATE 250 MG: 500 INJECTION, POWDER, LYOPHILIZED, FOR SOLUTION INTRAVENOUS at 09:35

## 2023-12-29 RX ADMIN — ALBUTEROL SULFATE 2.5 MG: 2.5 SOLUTION RESPIRATORY (INHALATION) at 15:36

## 2023-12-29 RX ADMIN — METOPROLOL SUCCINATE 50 MG: 50 TABLET, EXTENDED RELEASE ORAL at 08:38

## 2023-12-29 RX ADMIN — PREDNISONE 40 MG: 20 TABLET ORAL at 09:20

## 2023-12-29 RX ADMIN — GUAIFENESIN 1200 MG: 600 TABLET ORAL at 10:24

## 2023-12-29 RX ADMIN — ALBUTEROL SULFATE 2.5 MG: 2.5 SOLUTION RESPIRATORY (INHALATION) at 07:46

## 2023-12-29 RX ADMIN — METOPROLOL TARTRATE 25 MG: 25 TABLET, FILM COATED ORAL at 22:11

## 2023-12-29 RX ADMIN — GUAIFENESIN 1200 MG: 600 TABLET ORAL at 20:33

## 2023-12-29 NOTE — PROGRESS NOTES
Hospitalist Progress Note   Admit Date:  2023  4:37 PM   Name:  Benito Barber   Age:  73 y.o.  Sex:  male  :  1950   MRN:  [id number]   Room:      Presenting/Chief Complaint: Shortness of Breath     Reason(s) for Admission: Influenza A with respiratory manifestations [J10.1]  Atrial fibrillation with RVR (HCC) [I48.91]  Acute respiratory failure with hypoxia (HCC) [J96.01]  Acute on chronic respiratory failure with hypoxia (HCC) [J96.21]     Hospital Course:       Benito Barber is a 73 y.o. male with medical history of HTN, HLP, DVT on xarelto, DISH, OA , remote episode of AFIB admitted with flu positive since 23 and subsequent bacterial pneumonia with acute hypoxic respiratory failure.       Placed on BIPAP in ED  Admitted to pulmonary  Weaned to NC      CXR\"  IMPRESSION:  1. Bibasilar pneumonia, right greater than left..  2. Cardiomegaly   \"    He is on a course of rocephin and azithromycin    Prednisone added     No tamiflu due to duration of illness      Discharge plans pending to home with wife        Subjective & 24hr Events:       Wife present  Feels better  On 5 L NC  Feels he is coming around  Had afib once with colonoscopy and was seen by [facility]    Eating       Assessment & Plan:       Principal Problem:    Acute on chronic respiratory failure with hypoxia (HCC)  Plan:    Influenza A with respiratory manifestations  Bacterial pneumonia   Plan:   Weaned to 5 L NC  D3 rocephin and azithromycin  prednisone 40 mg daily   Continued nebs                 Active Problems:    FORTINO (obstructive sleep apnea)  Plan:   Noted             Paroxysmal atrial fibrillation (HCC)  Plan:   Resume metoprolol   On xarelto -resumed   ECHO 2018  EF 55-65%, in determinant diastolic function - will repeat   Followup cardiology            DVT:  Resumed xarelto         Hyponatremia:  Trend  --> 133--> 135            Hyperglycemia:  Followup glucoses   SSI         PT/OT evals and PPD

## 2023-12-29 NOTE — CARE COORDINATION
Chart reviewed by  for potential discharge needs or concerns.  Pt is insured with pharmacy benefits and is established with a PCP.  PT eval complete with the recommendation for HH PT at dc.  Pt may also need home O2.  CM attempted to meet with pt/spouse to discuss the HH recommendation but pt was with another provider.  CM will follow up with pt prior to dc and proceed with referrals if pt is agreeable.  Please notify/consult  if discharge needs arise.       12/29/23 2836   Service Assessment   Patient Orientation Alert and Oriented   Cognition Alert   History Provided By Medical Record   Primary Caregiver Self   Support Systems Spouse/Significant Other;Family Members;Friends/Neighbors;Children   Patient's Healthcare Decision Maker is: Legal Next of Kin   PCP Verified by CM Yes   Last Visit to PCP Within last 3 months   Prior Functional Level Independent in ADLs/IADLs   Current Functional Level Independent in ADLs/IADLs   Can patient return to prior living arrangement Yes   Ability to make needs known: Good   Family able to assist with home care needs: Yes   Would you like for me to discuss the discharge plan with any other family members/significant others, and if so, who? No   Financial Resources Medicare   Community Resources None   Social/Functional History   Lives With Spouse   Type of Home House   Home Layout Two level;Work area in basement;Able to Live on Main level with bedroom/bathroom   Home Access Stairs to enter with rails   Entrance Stairs - Number of Steps 2   Bathroom Shower/Tub Tub/Shower unit   Bathroom Toilet Handicap height   Bathroom Equipment Grab bars in shower   Home Equipment Cane   ADL Assistance Independent   Homemaking Assistance Independent   Ambulation Assistance Independent   Transfer Assistance Independent   Active  Yes   Occupation Retired   Discharge Planning   Type of Residence House   Living Arrangements Spouse/Significant Other   Current Services

## 2023-12-29 NOTE — PLAN OF CARE
Problem: Discharge Planning  Goal: Discharge to home or other facility with appropriate resources  12/28/2023 2022 by Indigo Rosa RN  Outcome: Progressing  Flowsheets (Taken 12/28/2023 2001)  Discharge to home or other facility with appropriate resources: Identify barriers to discharge with patient and caregiver  12/28/2023 1603 by Suzanne Clayton RN  Outcome: Progressing  12/28/2023 1600 by Suzanne Clayton RN  Outcome: Progressing     Problem: Pain  Goal: Verbalizes/displays adequate comfort level or baseline comfort level  12/28/2023 2022 by Indigo Rosa RN  Outcome: Progressing  12/28/2023 1603 by Suzanne Clayton RN  Outcome: Progressing  12/28/2023 1600 by Suzanne Clayton RN  Outcome: Progressing     Problem: Safety - Adult  Goal: Free from fall injury  Outcome: Progressing  Flowsheets (Taken 12/28/2023 2001)  Free From Fall Injury: Instruct family/caregiver on patient safety

## 2023-12-29 NOTE — PROGRESS NOTES
ACUTE PHYSICAL THERAPY GOALS:   (Developed with and agreed upon by patient and/or caregiver.)    (1.)Mr. Barber will move from supine to sit and sit to supine  in bed with INDEPENDENCE within 7 treatment day(s).    (2.)Mr. Barber will transfer from bed to chair and chair to bed with INDEPENDENT using the least restrictive device within 7 treatment day(s).    (3.)Mr. Barber will ambulate with INDEPENDENT for >500 feet with the least restrictive device within 7 treatment day(s) maintaining vitals WFL.  (4.)Mr. Barber will perform standing static and dynamic balance activities x 25 minutes with SUPERVISION to improve safety within 7 treatment day(s).  (5.)Mr. Barber will ambulate and/or perform functional activities for  25 consecutive minutes with stable vital signs and no rests required to improve activity tolerance within 7 treatment days.  ________________________________________________________________________________________________      PHYSICAL THERAPY Initial Assessment, Daily Note, and AM  (Link to Caseload Tracking: PT Visit Days : 1  Acknowledge Orders  Time In/Out  PT Charge Capture  Rehab Caseload Tracker    Benito Barber is a 73 y.o. male   PRIMARY DIAGNOSIS: Acute on chronic respiratory failure with hypoxia (HCC)  Influenza A with respiratory manifestations [J10.1]  Atrial fibrillation with RVR (HCC) [I48.91]  Acute respiratory failure with hypoxia (HCC) [J96.01]  Acute on chronic respiratory failure with hypoxia (HCC) [J96.21]       Reason for Referral: Generalized Muscle Weakness (M62.81)  Other abnormalities of gait and mobility (R26.89)  Inpatient: Payor: [facility] MEDICARE / Plan: [employer] MEDICARE-ADVANTAGE PPO / Product Type: Medicare /     ASSESSMENT:     REHAB RECOMMENDATIONS:   Recommendation to date pending progress:  Setting:  Home Health Therapy    Equipment:    To Be Determined  -Has SPC     ASSESSMENT:  Mr. Barber is a 73 year old M who presents to hospital after being diagnosed with influenza A  IN/OUT:  Time In: 0856  Time Out: 0916  Minutes: 20    Paola Suero, PT

## 2023-12-29 NOTE — PROGRESS NOTES
Benito Barber  Admission Date: 12/27/2023         Daily Progress Note: 12/29/2023    The patient's chart is reviewed and the patient is discussed with the staff.    Background: 73 y.o. male with HTN, HLD, PAF (Xarelto and metoprolol), DISH, FORTINO, and history of pulmonary thromboembolism in the distant past.  Patient started feeling ill a week ago Wednesday he was diagnosed as an outpatient with influenza A on Friday over the weekend and up to his presentation to the ED he was having increasing shortness of breath and exertional dyspnea.  Patient was found to be hypoxic and was requiring NIV/BiPAP.  Patient was a longtime smoker but has not smoked in 10 years.  Patient did use CPAP at home until he lost weight and has since come off of the CPAP.  Wife states he has regained the weight and may need to be back on her CPAP at night. Patient's been having intermittent fever with gray sputum production.     Subjective:     Asking when he can go home. Currently on 5lpm. Denies any SOB and able to ambulate small distances without any IRVIN. Having cough although not productive.     Current Facility-Administered Medications   Medication Dose Route Frequency    [Held by provider] ascorbic acid (VITAMIN C) tablet 500 mg  500 mg Oral Daily    [Held by provider] lisinopril-hydroCHLOROthiazide (PRINZIDE;ZESTORETIC) 20-25 MG per tablet 1 tablet  1 tablet Oral Daily    rivaroxaban (XARELTO) tablet 20 mg  20 mg Oral Dinner    metoprolol succinate (TOPROL XL) extended release tablet 50 mg  50 mg Oral Daily    [Held by provider] tamsulosin (FLOMAX) capsule 0.4 mg  0.4 mg Oral Daily    [Held by provider] omega-3 acid ethyl esters (LOVAZA) capsule 1,000 mg  1,000 mg Oral TID    [Held by provider] magnesium oxide (MAG-OX) tablet 400 mg  400 mg Oral Daily    [Held by provider] multivitamin with minerals tablet 1 tablet  1 tablet Oral Daily    [Held by provider] zinc sulfate (ZINCATE) 220 mg capsule - elemental zinc 50 mg  50 mg

## 2023-12-30 ENCOUNTER — APPOINTMENT (OUTPATIENT)
Dept: NON INVASIVE DIAGNOSTICS | Age: 73
DRG: 193 | End: 2023-12-30
Attending: INTERNAL MEDICINE
Payer: MEDICARE

## 2023-12-30 LAB
ALBUMIN SERPL-MCNC: 2.3 G/DL (ref 3.2–4.6)
ALBUMIN/GLOB SERPL: 0.6 (ref 0.4–1.6)
ALP SERPL-CCNC: 50 U/L (ref 50–136)
ALT SERPL-CCNC: 54 U/L (ref 12–65)
ANION GAP SERPL CALC-SCNC: 6 MMOL/L (ref 2–11)
AST SERPL-CCNC: 42 U/L (ref 15–37)
BASOPHILS # BLD: 0.1 K/UL (ref 0–0.2)
BASOPHILS NFR BLD: 1 % (ref 0–2)
BILIRUB DIRECT SERPL-MCNC: 0.2 MG/DL
BILIRUB SERPL-MCNC: 0.3 MG/DL (ref 0.2–1.1)
BUN SERPL-MCNC: 18 MG/DL (ref 8–23)
CALCIUM SERPL-MCNC: 8.7 MG/DL (ref 8.3–10.4)
CHLORIDE SERPL-SCNC: 105 MMOL/L (ref 103–113)
CO2 SERPL-SCNC: 26 MMOL/L (ref 21–32)
CREAT SERPL-MCNC: 0.8 MG/DL (ref 0.8–1.5)
DIFFERENTIAL METHOD BLD: ABNORMAL
EOSINOPHIL # BLD: 0 K/UL (ref 0–0.8)
EOSINOPHIL NFR BLD: 0 % (ref 0.5–7.8)
ERYTHROCYTE [DISTWIDTH] IN BLOOD BY AUTOMATED COUNT: 13.8 % (ref 11.9–14.6)
GLOBULIN SER CALC-MCNC: 3.8 G/DL (ref 2.8–4.5)
GLUCOSE SERPL-MCNC: 131 MG/DL (ref 65–100)
HCT VFR BLD AUTO: 42 % (ref 41.1–50.3)
HGB BLD-MCNC: 13.8 G/DL (ref 13.6–17.2)
IMM GRANULOCYTES # BLD AUTO: 1 K/UL (ref 0–0.5)
IMM GRANULOCYTES NFR BLD AUTO: 7 % (ref 0–5)
LYMPHOCYTES # BLD: 2.1 K/UL (ref 0.5–4.6)
LYMPHOCYTES NFR BLD: 14 % (ref 13–44)
MAGNESIUM SERPL-MCNC: 2.3 MG/DL (ref 1.8–2.4)
MCH RBC QN AUTO: 30.3 PG (ref 26.1–32.9)
MCHC RBC AUTO-ENTMCNC: 32.9 G/DL (ref 31.4–35)
MCV RBC AUTO: 92.3 FL (ref 82–102)
MONOCYTES # BLD: 1.2 K/UL (ref 0.1–1.3)
MONOCYTES NFR BLD: 8 % (ref 4–12)
NEUTS SEG # BLD: 10.5 K/UL (ref 1.7–8.2)
NEUTS SEG NFR BLD: 70 % (ref 43–78)
NRBC # BLD: 0 K/UL (ref 0–0.2)
PLATELET # BLD AUTO: 280 K/UL (ref 150–450)
PLATELET COMMENT: ADEQUATE
PMV BLD AUTO: 9.2 FL (ref 9.4–12.3)
POTASSIUM SERPL-SCNC: 3.5 MMOL/L (ref 3.5–5.1)
PROT SERPL-MCNC: 6.1 G/DL (ref 6.3–8.2)
RBC # BLD AUTO: 4.55 M/UL (ref 4.23–5.6)
RBC MORPH BLD: ABNORMAL
SODIUM SERPL-SCNC: 137 MMOL/L (ref 136–146)
WBC # BLD AUTO: 14.9 K/UL (ref 4.3–11.1)
WBC MORPH BLD: ABNORMAL

## 2023-12-30 PROCEDURE — 80076 HEPATIC FUNCTION PANEL: CPT

## 2023-12-30 PROCEDURE — 36415 COLL VENOUS BLD VENIPUNCTURE: CPT

## 2023-12-30 PROCEDURE — 2580000003 HC RX 258

## 2023-12-30 PROCEDURE — 99232 SBSQ HOSP IP/OBS MODERATE 35: CPT | Performed by: INTERNAL MEDICINE

## 2023-12-30 PROCEDURE — 2700000000 HC OXYGEN THERAPY PER DAY

## 2023-12-30 PROCEDURE — 94640 AIRWAY INHALATION TREATMENT: CPT

## 2023-12-30 PROCEDURE — 97165 OT EVAL LOW COMPLEX 30 MIN: CPT

## 2023-12-30 PROCEDURE — 94761 N-INVAS EAR/PLS OXIMETRY MLT: CPT

## 2023-12-30 PROCEDURE — 6370000000 HC RX 637 (ALT 250 FOR IP): Performed by: NURSE PRACTITIONER

## 2023-12-30 PROCEDURE — 97530 THERAPEUTIC ACTIVITIES: CPT

## 2023-12-30 PROCEDURE — 83735 ASSAY OF MAGNESIUM: CPT

## 2023-12-30 PROCEDURE — 6370000000 HC RX 637 (ALT 250 FOR IP)

## 2023-12-30 PROCEDURE — 1100000003 HC PRIVATE W/ TELEMETRY

## 2023-12-30 PROCEDURE — 99223 1ST HOSP IP/OBS HIGH 75: CPT | Performed by: INTERNAL MEDICINE

## 2023-12-30 PROCEDURE — 6360000002 HC RX W HCPCS

## 2023-12-30 PROCEDURE — 6370000000 HC RX 637 (ALT 250 FOR IP): Performed by: INTERNAL MEDICINE

## 2023-12-30 PROCEDURE — 80048 BASIC METABOLIC PNL TOTAL CA: CPT

## 2023-12-30 PROCEDURE — 6370000000 HC RX 637 (ALT 250 FOR IP): Performed by: STUDENT IN AN ORGANIZED HEALTH CARE EDUCATION/TRAINING PROGRAM

## 2023-12-30 PROCEDURE — 6360000002 HC RX W HCPCS: Performed by: INTERNAL MEDICINE

## 2023-12-30 PROCEDURE — 85025 COMPLETE CBC W/AUTO DIFF WBC: CPT

## 2023-12-30 RX ADMIN — GUAIFENESIN 1200 MG: 600 TABLET ORAL at 09:13

## 2023-12-30 RX ADMIN — METOPROLOL TARTRATE 25 MG: 25 TABLET, FILM COATED ORAL at 04:24

## 2023-12-30 RX ADMIN — BUDESONIDE INHALATION 500 MCG: 0.5 SUSPENSION RESPIRATORY (INHALATION) at 07:21

## 2023-12-30 RX ADMIN — AZITHROMYCIN MONOHYDRATE 250 MG: 500 INJECTION, POWDER, LYOPHILIZED, FOR SOLUTION INTRAVENOUS at 12:30

## 2023-12-30 RX ADMIN — METOPROLOL TARTRATE 25 MG: 25 TABLET, FILM COATED ORAL at 09:13

## 2023-12-30 RX ADMIN — SODIUM CHLORIDE, PRESERVATIVE FREE 10 ML: 5 INJECTION INTRAVENOUS at 09:14

## 2023-12-30 RX ADMIN — SODIUM CHLORIDE, PRESERVATIVE FREE 5 ML: 5 INJECTION INTRAVENOUS at 20:43

## 2023-12-30 RX ADMIN — ALBUTEROL SULFATE 2.5 MG: 2.5 SOLUTION RESPIRATORY (INHALATION) at 07:22

## 2023-12-30 RX ADMIN — RIVAROXABAN 20 MG: 20 TABLET, FILM COATED ORAL at 16:49

## 2023-12-30 RX ADMIN — PREDNISONE 40 MG: 20 TABLET ORAL at 09:13

## 2023-12-30 RX ADMIN — ALBUTEROL SULFATE 2.5 MG: 2.5 SOLUTION RESPIRATORY (INHALATION) at 15:36

## 2023-12-30 RX ADMIN — PANTOPRAZOLE SODIUM 40 MG: 40 TABLET, DELAYED RELEASE ORAL at 05:58

## 2023-12-30 RX ADMIN — METOPROLOL TARTRATE 25 MG: 25 TABLET, FILM COATED ORAL at 16:50

## 2023-12-30 RX ADMIN — GUAIFENESIN 1200 MG: 600 TABLET ORAL at 20:43

## 2023-12-30 RX ADMIN — ALBUTEROL SULFATE 2.5 MG: 2.5 SOLUTION RESPIRATORY (INHALATION) at 12:10

## 2023-12-30 RX ADMIN — WATER 1000 MG: 1 INJECTION INTRAMUSCULAR; INTRAVENOUS; SUBCUTANEOUS at 09:14

## 2023-12-30 RX ADMIN — METOPROLOL TARTRATE 25 MG: 25 TABLET, FILM COATED ORAL at 20:43

## 2023-12-30 NOTE — PROGRESS NOTES
Ambulated pt on RA per request from wife.   Exerted without signs of dyspnea or fatigue x 5min.   Spo2 remained 94% or > with HR variable 60s to 100.

## 2023-12-30 NOTE — PROGRESS NOTES
12/30/23 1002   Resting (Room Air)   SpO2 84   HR 95   Resting (On O2)   SpO2 90   HR 91   O2 Device Nasal cannula   O2 Flow Rate (l/min) 2 l/min   Comments Some FORTINO while napping on RA   During Walk (On O2)   SpO2 96   HR 96   O2 Device Nasal cannula   O2 Flow Rate (l/min) 2 l/min   Need Additional O2 Flow Rate Rows No   Walk/Assistance Device Ambulation   Rate of Dyspnea 0   After Walk   SpO2 95      O2 Flow Rate (l/min) 2 l/min   Rate of Dyspnea 0   Does the Patient Qualify for Home O2 Yes   Liter Flow at Rest 2   Liter Flow on Exertion 2   Does the Patient Need Portable Oxygen Tanks Yes

## 2023-12-30 NOTE — THERAPY EVALUATION
ACUTE OCCUPATIONAL THERAPY GOALS:   (Developed with and agreed upon by patient and/or caregiver.)  1. Patient will complete lower body bathing and dressing with MODIFIED INDEPENDENCE and adaptive equipment as needed.     2. Patient will complete toilet transfers and toileting with MODIFIED INDEPENDENCE.  3. Patient will complete grooming ADL tasks standing at sink with MODIFIED INDEPENDENCE.  4. Patient will tolerate 30 minutes of OT treatment with 1-2 rest breaks to increase activity tolerance for ADLs.   5. Patient will complete functional transfers with MODIFIED INDEPENDENCE and adaptive equipment as needed.   6. Patient will tolerate 10 minutes BUE exercises to increase strength for safe, functional transfers.     Timeframe: 7 visits      OCCUPATIONAL THERAPY Initial Assessment, Daily Note, and AM       OT Visit Days: 1  Acknowledge Orders  Time  OT Charge Capture  Rehab Caseload Tracker      Benito Barber is a 73 y.o. male   PRIMARY DIAGNOSIS: Acute respiratory failure with hypoxia (HCC)  Influenza A with respiratory manifestations [J10.1]  Atrial fibrillation with RVR (HCC) [I48.91]  Acute respiratory failure with hypoxia (HCC) [J96.01]  Acute on chronic respiratory failure with hypoxia (HCC) [J96.21]       Reason for Referral: Generalized Muscle Weakness (M62.81)  Other lack of cordination (R27.8)  Difficulty in walking, Not elsewhere classified (R26.2)  Other abnormalities of gait and mobility (R26.89)  Inpatient: Payor: [facility] MEDICARE / Plan: AET MEDICARE-ADVANTAGE PPO / Product Type: Medicare /     ASSESSMENT:     REHAB RECOMMENDATIONS:   Recommendation to date pending progress:  Setting:  Home Health Therapy    Equipment:    To Be Determined--pt has SPC at home     ASSESSMENT:  Mr. Barber is a 73 y.o. male who presents to the hospital with worsening SOB and exertional dyspnea. PMHx of HTN, HLD, PAF, DISH, FORTINO, hx of pulmonary thromboembolism, and recent diagnosis of influenza A.    Today, pt is  [x] []     Toilet [] [] [] [] [] [] [] [] [] [x] []      [] [] [] [] [] [] [] [] [] [] []    I=Independent, Mod I=Modified Independent, S=Supervision/Setup, SBA=Standby Assistance, CGA=Contact Guard Assistance, Min=Minimal Assistance, Mod=Moderate Assistance, Max=Maximal Assistance, Total=Total Assistance, NT=Not Tested    ACTIVITIES OF DAILY LIVING: I Mod I S SBA CGA Min Mod Max Total NT Comments   BASIC ADLs:              Upper Body Bathing  [] [] [] [] [] [] [] [] [] [x]     Lower Body Bathing [] [] [] [] [] [] [] [] [] [x]     Toileting [] [] [] [] [] [] [] [] [] [x]    Upper Body Dressing [] [] [] [] [] [] [] [] [] [x]    Lower Body Dressing [] [] [] [] [] [] [] [] [] [x]    Feeding [] [] [] [] [] [] [] [] [] [x]    Grooming [] [] [] [] [] [] [] [] [] [x]    Personal Device Care [] [] [] [] [] [] [] [] [] [x]    Functional Mobility [] [] [] [x] [] [] [] [] [] [] No AD   I=Independent, Mod I=Modified Independent, S=Supervision/Setup, SBA=Standby Assistance, CGA=Contact Guard Assistance, Min=Minimal Assistance, Mod=Moderate Assistance, Max=Maximal Assistance, Total=Total Assistance, NT=Not Tested    PLAN:   FREQUENCY/DURATION   OT Plan of Care: 3 times/week for duration of hospital stay or until stated goals are met, whichever comes first.    PROBLEM LIST:   (Skilled intervention is medically necessary to address:)  Decreased ADL/Functional Activities  Decreased Activity Tolerance  Decreased Balance  Decreased Coordination  Decreased Gait Ability  Decreased Safety Awareness  Decreased Strength  Decreased Transfer Abilities   INTERVENTIONS PLANNED:  (Benefits and precautions of occupational therapy have been discussed with the patient.)  Self Care Training  Therapeutic Activity  Therapeutic Exercise/HEP  Neuromuscular Re-education  Manual Therapy  Education         TREATMENT:     EVALUATION: LOW COMPLEXITY: (Untimed Charge)    TREATMENT:   Therapeutic Activity (10 Minutes): Patient participated in therapeutic

## 2023-12-30 NOTE — DISCHARGE INSTRUCTIONS
[facility]  [address] [place] [address]   [phone]    The pulmonary office will call you in 1-2 business days to arrange follow up in the pulmonary office. If you have not heard from the office after 2 full business days, please call the office to arrange follow up.

## 2023-12-30 NOTE — PROGRESS NOTES
Hospitalist Progress Note   Admit Date:  2023  4:37 PM   Name:  Benito Barber   Age:  73 y.o.  Sex:  male  :  1950   MRN:  [id number]   Room:  [facility]/    Presenting/Chief Complaint: Shortness of Breath     Reason(s) for Admission: Influenza A with respiratory manifestations [J10.1]  Atrial fibrillation with RVR (HCC) [I48.91]  Acute respiratory failure with hypoxia (HCC) [J96.01]  Acute on chronic respiratory failure with hypoxia (HCC) [J96.21]     Hospital Course:       Benito Barber is a 73 y.o. male with medical history of HTN, HLP, DVT on xarelto, DISH, OA , remote episode of AFIB admitted with flu positive since 23 and subsequent bacterial pneumonia with acute hypoxic respiratory failure.       Placed on BIPAP in ED  Admitted to pulmonary  Weaned to NC      CXR\"  IMPRESSION:  1. Bibasilar pneumonia, right greater than left..  2. Cardiomegaly   \"    He is on a course of rocephin and azithromycin    Prednisone added     No tamiflu due to duration of illness      Seen by cardiology for afib  On xarelto/ metoprolol  ECH pending         Discharge plans pending to home with wife        Subjective & 24hr Events:     Wife present  Sitting at bedside   Wants to go home        Assessment & Plan:       Principal Problem:    Acute on chronic respiratory failure with hypoxia (HCC)  Plan:    Influenza A with respiratory manifestations  Bacterial pneumonia   Plan:   Weaned to 2 L NC  D4/5 rocephin and azithromycin  prednisone 40 mg daily   Continued nebs   Assess discharge home O2 needs                 Active Problems:    FORTINO (obstructive sleep apnea)  Plan:   Noted             Paroxysmal atrial fibrillation (HCC)  Plan:   metoprolol 25 mg every 6 hours   On xarelto  Pending ECHO   cardiology following            DVT:  Resumed xarelto         Hyponatremia:  Trend  --> 133--> 135 --> 137           Hyperglycemia:  Followup glucoses   SSI   A1C 6.1          Elevated AST:  Trend- 42        PT/OT  tablet Oral Daily    rivaroxaban (XARELTO) tablet 20 mg  20 mg Oral Dinner    [Held by provider] metoprolol succinate (TOPROL XL) extended release tablet 50 mg  50 mg Oral Daily    [Held by provider] tamsulosin (FLOMAX) capsule 0.4 mg  0.4 mg Oral Daily    [Held by provider] omega-3 acid ethyl esters (LOVAZA) capsule 1,000 mg  1,000 mg Oral TID    [Held by provider] magnesium oxide (MAG-OX) tablet 400 mg  400 mg Oral Daily    [Held by provider] multivitamin with minerals tablet 1 tablet  1 tablet Oral Daily    [Held by provider] zinc sulfate (ZINCATE) 220 mg capsule - elemental zinc 50 mg  50 mg Oral Daily RT    [Held by provider] Vitamin D (CHOLECALCIFEROL) tablet 1,000 Units  1,000 Units Oral Daily RT    [Held by provider] Glucosamine-Chondroitin 7994-1983 MG/30ML LIQD 1,500 mg (Patient Supplied)  1,500 mg Oral Daily    [Held by provider] Potassium TABS 99 mg (Patient Supplied)  1 tablet Oral Daily    [Held by provider] tiZANidine (ZANAFLEX) tablet 2 mg  2 mg Oral TID    [Held by provider] fenofibrate (TRICOR) tablet 54 mg  54 mg Oral Daily    cefTRIAXone (ROCEPHIN) 1,000 mg in sterile water 10 mL IV syringe  1,000 mg IntraVENous Q24H    azithromycin (ZITHROMAX) 250 mg in sodium chloride 0.9 % 250 mL IVPB  250 mg IntraVENous Q24H    albuterol (PROVENTIL) (2.5 MG/3ML) 0.083% nebulizer solution 2.5 mg  2.5 mg Nebulization 4x Daily RT    budesonide (PULMICORT) nebulizer suspension 500 mcg  0.5 mg Nebulization BID RT    sodium chloride (Inhalant) 3 % nebulizer solution 4 mL  4 mL Nebulization PRN    predniSONE (DELTASONE) tablet 40 mg  40 mg Oral Daily    pantoprazole (PROTONIX) tablet 40 mg  40 mg Oral QAM AC    sodium chloride flush 0.9 % injection 5-40 mL  5-40 mL IntraVENous 2 times per day    sodium chloride flush 0.9 % injection 5-40 mL  5-40 mL IntraVENous PRN    0.9 % sodium chloride infusion   IntraVENous PRN    potassium chloride 20 mEq/50 mL IVPB (Central Line)  20 mEq IntraVENous PRN    Or    potassium

## 2023-12-30 NOTE — PLAN OF CARE
Problem: Discharge Planning  Goal: Discharge to home or other facility with appropriate resources  Outcome: Progressing  Flowsheets (Taken 12/29/2023 1909)  Discharge to home or other facility with appropriate resources: Identify barriers to discharge with patient and caregiver     Problem: Pain  Goal: Verbalizes/displays adequate comfort level or baseline comfort level  Outcome: Progressing     Problem: Safety - Adult  Goal: Free from fall injury  Outcome: Progressing  Flowsheets (Taken 12/29/2023 1909)  Free From Fall Injury: Instruct family/caregiver on patient safety

## 2023-12-30 NOTE — PROGRESS NOTES
Benito Barber  Admission Date: 12/27/2023         Daily Progress Note: 12/30/2023    The patient's chart is reviewed and the patient is discussed with the staff.    Background: 73 y.o. male with HTN, HLD, PAF (Xarelto and metoprolol), DISH, FORTINO, and history of pulmonary thromboembolism in the distant past.  Patient started feeling ill a week ago Wednesday he was diagnosed as an outpatient with influenza A on Friday over the weekend and up to his presentation to the ED he was having increasing shortness of breath and exertional dyspnea.  Patient was found to be hypoxic and was requiring NIV/BiPAP.  Patient was a longtime smoker but has not smoked in 10 years.  Patient did use CPAP at home until he lost weight and has since come off of the CPAP.  Wife states he has regained the weight and may need to be back on her CPAP at night. Patient's been having intermittent fever with gray sputum production.     Subjective:    On 2lpm. States breathing is fine. Has occasional cough with some difficulty clearing mucous.     Current Facility-Administered Medications   Medication Dose Route Frequency    guaiFENesin (MUCINEX) extended release tablet 1,200 mg  1,200 mg Oral BID    glucose chewable tablet 16 g  4 tablet Oral PRN    dextrose bolus 10% 125 mL  125 mL IntraVENous PRN    Or    dextrose bolus 10% 250 mL  250 mL IntraVENous PRN    glucagon (rDNA) injection 1 mg  1 mg SubCUTAneous PRN    dextrose 10 % infusion   IntraVENous Continuous PRN    insulin lispro (HUMALOG) injection vial 0-4 Units  0-4 Units SubCUTAneous TID WC    insulin lispro (HUMALOG) injection vial 0-4 Units  0-4 Units SubCUTAneous Nightly    metoprolol tartrate (LOPRESSOR) tablet 25 mg  25 mg Oral Q6H    [Held by provider] ascorbic acid (VITAMIN C) tablet 500 mg  500 mg Oral Daily    [Held by provider] lisinopril-hydroCHLOROthiazide (PRINZIDE;ZESTORETIC) 20-25 MG per tablet 1 tablet  1 tablet Oral Daily    rivaroxaban (XARELTO) tablet 20 mg  20  126* 133* 135* 137   K 2.8* 3.7 3.4* 3.5   CL 93* 99* 102* 105   CO2 25 26 28 26   BUN 14 16 19 18   CREATININE 0.70* 0.80 0.70* 0.80   MG 2.1  --   --  2.3   BILITOT 0.6  --   --  0.3   AST 39*  --   --  42*   ALT 35  --   --  54   ALKPHOS 49*  --   --  50       Recent Labs     12/27/23  1645   NTPROBNP 1,277*       Recent Labs     12/28/23  0616 12/29/23  0328 12/30/23  0320   GLUCOSE 186* 207* 131*        Microbiology:   Recent Labs     12/27/23  1558 12/27/23  1645   CULTURE NO GROWTH 3 DAYS NO GROWTH 3 DAYS       ECHO: No results found for this or any previous visit.    Assessment and Plan:  (Medical Decision Making)   Impression:  73 year old with post influenza CAP that briefly required BiPAP.    Principal Problem:    Acute on chronic respiratory failure with hypoxia (HCC)  Plan: on 2lpm and exercise oximetry revealed needs 2lpm at rest and with exertion. Continue to wean O2 as tolerated.  Active Problems:    FORTINO (obstructive sleep apnea)  Plan: not on CPAP at home after weight loss. Will need OP eval with HST.     Paroxysmal atrial fibrillation (HCC)  Plan: rate controlled. Monitor on remote telemetry. Cardiology has seen     Venous stasis syndrome  Plan: consider lasix when more stable    Diastolic dysfunction  Plan: medical management, lasix PRN   CAP  Plan: on azithromcyin and rocephin EOT 1/1. Sputum in process, can likely transition to PO abx.      More than 50% of the time documented was spent in face-to-face contact with the patient and in the care of the patient on the floor/unit where the patient is located.    In this split/shared evaluation I performed performed a medically appropriate history and exam, counseled and educated the patient and/or family member, documented information in EMR, and coordinated care. which accounted for 14 clinical time.     PROMISE Alvarenga CNP    In this split/shared evaluation I performed reviewed the patients's H&P, available images, labs, cultures.,

## 2023-12-30 NOTE — CONSULTS
100s, running mostly in the upper 90s per tele, this is really an acceptable HR in the setting of his current issues -- bilat PNA, +Flu and likely untx FORTINO -- as well as ongoing tx with albuterol nebs and steroids, on albuterol nebs presently but could change if RVR becomes more uncontrolled, would recommend keep K>4, Mag >2 and supplement as needed, wean O2 but ensure Pt not getting hypoxic as this could worsen his RVR, he is not aware of the irregular HR -- BP is stable, hope is that RVR/Afib will improve as underlying lung disease improves, unlikely that Pt would stay in SR with present pulm status if we tried DCCV -- so will opt for rate control for now, will change to IR Metoprolol q6 and monitor HR control, he has been receiving his Xarelto, can reconsider DCCV prior to dc pending clinical/pulm course, could consider adding PO Dilt if ECHO acceptable -- will f/u on report, if HD unstable could consider attempt at DCCV but will monitor with above med changes for now, further recommendations pending clinical course        Phoebe Walker, PROMISE - CNP-C  12/30/2023  12:29 AM

## 2023-12-31 ENCOUNTER — APPOINTMENT (OUTPATIENT)
Dept: NON INVASIVE DIAGNOSTICS | Age: 73
DRG: 193 | End: 2023-12-31
Attending: INTERNAL MEDICINE
Payer: MEDICARE

## 2023-12-31 VITALS
TEMPERATURE: 98.1 F | HEIGHT: 70 IN | DIASTOLIC BLOOD PRESSURE: 95 MMHG | BODY MASS INDEX: 38.51 KG/M2 | OXYGEN SATURATION: 91 % | HEART RATE: 72 BPM | RESPIRATION RATE: 18 BRPM | SYSTOLIC BLOOD PRESSURE: 135 MMHG | WEIGHT: 269 LBS

## 2023-12-31 LAB
ANION GAP SERPL CALC-SCNC: 4 MMOL/L (ref 2–11)
BACTERIA SPEC CULT: NORMAL
BASOPHILS # BLD: 0.1 K/UL (ref 0–0.2)
BASOPHILS NFR BLD: 1 % (ref 0–2)
BUN SERPL-MCNC: 19 MG/DL (ref 8–23)
CALCIUM SERPL-MCNC: 8.8 MG/DL (ref 8.3–10.4)
CHLORIDE SERPL-SCNC: 106 MMOL/L (ref 103–113)
CO2 SERPL-SCNC: 28 MMOL/L (ref 21–32)
CREAT SERPL-MCNC: 0.8 MG/DL (ref 0.8–1.5)
DIFFERENTIAL METHOD BLD: ABNORMAL
ECHO AO ASC DIAM: 4.1 CM
ECHO AO ASCENDING AORTA INDEX: 1.73 CM/M2
ECHO AO ROOT DIAM: 4.5 CM
ECHO AO ROOT INDEX: 1.9 CM/M2
ECHO AV AREA PEAK VELOCITY: 2.5 CM2
ECHO AV AREA VTI: 2.4 CM2
ECHO AV AREA/BSA PEAK VELOCITY: 1.1 CM2/M2
ECHO AV AREA/BSA VTI: 1 CM2/M2
ECHO AV MEAN GRADIENT: 6 MMHG
ECHO AV MEAN GRADIENT: 6 MMHG
ECHO AV MEAN VELOCITY: 1.2 M/S
ECHO AV PEAK GRADIENT: 13 MMHG
ECHO AV PEAK VELOCITY: 1.8 M/S
ECHO AV VELOCITY RATIO: 0.67
ECHO AV VTI: 29.7 CM
ECHO BSA: 2.45 M2
ECHO EST RA PRESSURE: 3 MMHG
ECHO IVC PROX: 2.5 CM
ECHO LA AREA 2C: 21.3 CM2
ECHO LA AREA 4C: 21.4 CM2
ECHO LA DIAMETER INDEX: 1.94 CM/M2
ECHO LA DIAMETER: 4.6 CM
ECHO LA MAJOR AXIS: 6.2 CM
ECHO LA MINOR AXIS: 6 CM
ECHO LA TO AORTIC ROOT RATIO: 1.02
ECHO LA VOL BP: 62 ML (ref 18–58)
ECHO LA VOL MOD A2C: 64 ML (ref 18–58)
ECHO LA VOL MOD A4C: 58 ML (ref 18–58)
ECHO LA VOL/BSA BIPLANE: 26 ML/M2 (ref 16–34)
ECHO LA VOLUME INDEX MOD A2C: 27 ML/M2 (ref 16–34)
ECHO LA VOLUME INDEX MOD A4C: 24 ML/M2 (ref 16–34)
ECHO LV E' LATERAL VELOCITY: 11 CM/S
ECHO LV E' SEPTAL VELOCITY: 10 CM/S
ECHO LV FRACTIONAL SHORTENING: 20 % (ref 28–44)
ECHO LV INTERNAL DIMENSION DIASTOLE INDEX: 2.28 CM/M2
ECHO LV INTERNAL DIMENSION DIASTOLIC: 5.4 CM (ref 4.2–5.9)
ECHO LV INTERNAL DIMENSION SYSTOLIC INDEX: 1.81 CM/M2
ECHO LV INTERNAL DIMENSION SYSTOLIC: 4.3 CM
ECHO LV IVSD: 1.3 CM (ref 0.6–1)
ECHO LV MASS 2D: 295.6 G (ref 88–224)
ECHO LV MASS INDEX 2D: 124.7 G/M2 (ref 49–115)
ECHO LV POSTERIOR WALL DIASTOLIC: 1.3 CM (ref 0.6–1)
ECHO LV RELATIVE WALL THICKNESS RATIO: 0.48
ECHO LVOT AREA: 3.8 CM2
ECHO LVOT AV VTI INDEX: 0.63
ECHO LVOT DIAM: 2.2 CM
ECHO LVOT MEAN GRADIENT: 2 MMHG
ECHO LVOT PEAK GRADIENT: 5 MMHG
ECHO LVOT PEAK VELOCITY: 1.2 M/S
ECHO LVOT STROKE VOLUME INDEX: 29.8 ML/M2
ECHO LVOT SV: 70.7 ML
ECHO LVOT VTI: 18.6 CM
ECHO MV E DECELERATION TIME (DT): 227 MS
ECHO MV E VELOCITY: 0.99 M/S
ECHO MV E/E' LATERAL: 9
ECHO MV E/E' RATIO (AVERAGED): 9.45
ECHO PV MAX VELOCITY: 1.1 M/S
ECHO PV PEAK GRADIENT: 5 MMHG
ECHO RIGHT VENTRICULAR SYSTOLIC PRESSURE (RVSP): 28 MMHG
ECHO TV REGURGITANT MAX VELOCITY: 2.51 M/S
ECHO TV REGURGITANT PEAK GRADIENT: 25 MMHG
EOSINOPHIL # BLD: 0 K/UL (ref 0–0.8)
EOSINOPHIL NFR BLD: 0 % (ref 0.5–7.8)
ERYTHROCYTE [DISTWIDTH] IN BLOOD BY AUTOMATED COUNT: 13.8 % (ref 11.9–14.6)
GLUCOSE SERPL-MCNC: 109 MG/DL (ref 65–100)
GRAM STN SPEC: NORMAL
HCT VFR BLD AUTO: 41.7 % (ref 41.1–50.3)
HGB BLD-MCNC: 13.9 G/DL (ref 13.6–17.2)
IMM GRANULOCYTES # BLD AUTO: 1.1 K/UL (ref 0–0.5)
IMM GRANULOCYTES NFR BLD AUTO: 9 % (ref 0–5)
LYMPHOCYTES # BLD: 2.2 K/UL (ref 0.5–4.6)
LYMPHOCYTES NFR BLD: 18 % (ref 13–44)
MAGNESIUM SERPL-MCNC: 2.5 MG/DL (ref 1.8–2.4)
MCH RBC QN AUTO: 30.8 PG (ref 26.1–32.9)
MCHC RBC AUTO-ENTMCNC: 33.3 G/DL (ref 31.4–35)
MCV RBC AUTO: 92.5 FL (ref 82–102)
MONOCYTES # BLD: 0.7 K/UL (ref 0.1–1.3)
MONOCYTES NFR BLD: 6 % (ref 4–12)
NEUTS SEG # BLD: 8.2 K/UL (ref 1.7–8.2)
NEUTS SEG NFR BLD: 66 % (ref 43–78)
NRBC # BLD: 0 K/UL (ref 0–0.2)
PLATELET # BLD AUTO: 329 K/UL (ref 150–450)
PLATELET COMMENT: ADEQUATE
PMV BLD AUTO: 9.4 FL (ref 9.4–12.3)
POTASSIUM SERPL-SCNC: 3.8 MMOL/L (ref 3.5–5.1)
RBC # BLD AUTO: 4.51 M/UL (ref 4.23–5.6)
RBC MORPH BLD: ABNORMAL
SERVICE CMNT-IMP: NORMAL
SODIUM SERPL-SCNC: 138 MMOL/L (ref 136–146)
WBC # BLD AUTO: 12.3 K/UL (ref 4.3–11.1)
WBC MORPH BLD: ABNORMAL

## 2023-12-31 PROCEDURE — 94640 AIRWAY INHALATION TREATMENT: CPT

## 2023-12-31 PROCEDURE — 36415 COLL VENOUS BLD VENIPUNCTURE: CPT

## 2023-12-31 PROCEDURE — A4216 STERILE WATER/SALINE, 10 ML: HCPCS | Performed by: INTERNAL MEDICINE

## 2023-12-31 PROCEDURE — 99232 SBSQ HOSP IP/OBS MODERATE 35: CPT | Performed by: INTERNAL MEDICINE

## 2023-12-31 PROCEDURE — 2580000003 HC RX 258

## 2023-12-31 PROCEDURE — 85025 COMPLETE CBC W/AUTO DIFF WBC: CPT

## 2023-12-31 PROCEDURE — C8929 TTE W OR WO FOL WCON,DOPPLER: HCPCS

## 2023-12-31 PROCEDURE — 6360000004 HC RX CONTRAST MEDICATION: Performed by: INTERNAL MEDICINE

## 2023-12-31 PROCEDURE — 80048 BASIC METABOLIC PNL TOTAL CA: CPT

## 2023-12-31 PROCEDURE — 6370000000 HC RX 637 (ALT 250 FOR IP): Performed by: STUDENT IN AN ORGANIZED HEALTH CARE EDUCATION/TRAINING PROGRAM

## 2023-12-31 PROCEDURE — 6370000000 HC RX 637 (ALT 250 FOR IP): Performed by: INTERNAL MEDICINE

## 2023-12-31 PROCEDURE — 6370000000 HC RX 637 (ALT 250 FOR IP): Performed by: NURSE PRACTITIONER

## 2023-12-31 PROCEDURE — 6360000002 HC RX W HCPCS: Performed by: INTERNAL MEDICINE

## 2023-12-31 PROCEDURE — 6360000002 HC RX W HCPCS

## 2023-12-31 PROCEDURE — 83735 ASSAY OF MAGNESIUM: CPT

## 2023-12-31 PROCEDURE — 2580000003 HC RX 258: Performed by: INTERNAL MEDICINE

## 2023-12-31 RX ORDER — PREDNISONE 20 MG/1
40 TABLET ORAL DAILY
Qty: 4 TABLET | Refills: 0 | Status: SHIPPED | OUTPATIENT
Start: 2024-01-01 | End: 2024-01-03

## 2023-12-31 RX ORDER — AZITHROMYCIN 250 MG/1
250 TABLET, FILM COATED ORAL DAILY
Status: DISCONTINUED | OUTPATIENT
Start: 2023-12-31 | End: 2023-12-31 | Stop reason: HOSPADM

## 2023-12-31 RX ORDER — AZITHROMYCIN 250 MG/1
250 TABLET, FILM COATED ORAL DAILY
Qty: 1 TABLET | Refills: 0 | Status: SHIPPED | OUTPATIENT
Start: 2024-01-01 | End: 2024-01-02

## 2023-12-31 RX ORDER — PANTOPRAZOLE SODIUM 40 MG/1
40 TABLET, DELAYED RELEASE ORAL
Qty: 30 TABLET | Refills: 0 | Status: SHIPPED | OUTPATIENT
Start: 2024-01-01

## 2023-12-31 RX ORDER — CEFPODOXIME PROXETIL 200 MG/1
200 TABLET, FILM COATED ORAL 2 TIMES DAILY
Qty: 2 TABLET | Refills: 0 | Status: SHIPPED | OUTPATIENT
Start: 2023-12-31 | End: 2024-01-01

## 2023-12-31 RX ADMIN — ALBUTEROL SULFATE 2.5 MG: 2.5 SOLUTION RESPIRATORY (INHALATION) at 07:24

## 2023-12-31 RX ADMIN — METOPROLOL TARTRATE 25 MG: 25 TABLET, FILM COATED ORAL at 09:11

## 2023-12-31 RX ADMIN — PREDNISONE 40 MG: 20 TABLET ORAL at 09:11

## 2023-12-31 RX ADMIN — PANTOPRAZOLE SODIUM 40 MG: 40 TABLET, DELAYED RELEASE ORAL at 06:03

## 2023-12-31 RX ADMIN — AZITHROMYCIN DIHYDRATE 250 MG: 250 TABLET ORAL at 09:11

## 2023-12-31 RX ADMIN — GUAIFENESIN 1200 MG: 600 TABLET ORAL at 09:11

## 2023-12-31 RX ADMIN — ALBUTEROL SULFATE 2.5 MG: 2.5 SOLUTION RESPIRATORY (INHALATION) at 11:11

## 2023-12-31 RX ADMIN — WATER 1000 MG: 1 INJECTION INTRAMUSCULAR; INTRAVENOUS; SUBCUTANEOUS at 09:12

## 2023-12-31 RX ADMIN — METOPROLOL TARTRATE 25 MG: 25 TABLET, FILM COATED ORAL at 04:39

## 2023-12-31 RX ADMIN — PERFLUTREN 0.45 ML: 6.52 INJECTION, SUSPENSION INTRAVENOUS at 09:05

## 2023-12-31 RX ADMIN — BUDESONIDE INHALATION 500 MCG: 0.5 SUSPENSION RESPIRATORY (INHALATION) at 07:24

## 2023-12-31 ASSESSMENT — PAIN SCALES - GENERAL: PAINLEVEL_OUTOF10: 0

## 2023-12-31 NOTE — PROGRESS NOTES
Discharge instructions, medication side effects sheet, follow up appointment and prescriptions reviewed and explained to the patient. Patient verbalizes understanding of instructions. A copy of discharge instructions and prescriptions  have been given to patient.  Opportunity for questions provided.  IV removed.

## 2023-12-31 NOTE — PLAN OF CARE
Problem: Discharge Planning  Goal: Discharge to home or other facility with appropriate resources  Outcome: Progressing  Flowsheets  Taken 12/30/2023 1915 by Isha Pitts RN  Discharge to home or other facility with appropriate resources: Identify barriers to discharge with patient and caregiver  Taken 12/30/2023 0745 by Emilie Man RN  Discharge to home or other facility with appropriate resources: Identify barriers to discharge with patient and caregiver     Problem: Pain  Goal: Verbalizes/displays adequate comfort level or baseline comfort level  Outcome: Progressing  Flowsheets (Taken 12/30/2023 0718 by Emilie Man, RN)  Verbalizes/displays adequate comfort level or baseline comfort level: Encourage patient to monitor pain and request assistance     Problem: Safety - Adult  Goal: Free from fall injury  Outcome: Progressing  Flowsheets (Taken 12/30/2023 1915)  Free From Fall Injury: Instruct family/caregiver on patient safety

## 2023-12-31 NOTE — DISCHARGE SUMMARY
Hospitalist Discharge Summary   Admit Date:  2023  4:37 PM   DC Note date: 2023  Name:  Benito Barber   Age:  73 y.o.  Sex:  male  :  1950   MRN:  [id number]   Room:  [facility]  PCP:  Loida Neville    Presenting Complaint: Shortness of Breath     Initial Admission Diagnosis: Influenza A with respiratory manifestations [J10.1]  Atrial fibrillation with RVR (HCC) [I48.91]  Acute respiratory failure with hypoxia (HCC) [J96.01]  Acute on chronic respiratory failure with hypoxia (HCC) [J96.21]     Problem List for this Hospitalization (present on admission):    Principal Problem:    Acute respiratory failure with hypoxia (HCC)  Active Problems:    FORTINO (obstructive sleep apnea)    Paroxysmal atrial fibrillation (HCC)    Venous stasis syndrome    Diastolic dysfunction    Influenza A with respiratory manifestations    Atrial fibrillation with RVR (HCC)  Resolved Problems:    * No resolved hospital problems. *      Hospital Course:      Benito Barber is a 73 y.o. male with medical history of HTN, HLP, DVT on xarelto, DISH, OA , remote episode of AFIB admitted with flu positive since 23 and subsequent bacterial pneumonia with acute hypoxic respiratory failure.         Placed on BIPAP in ED  Admitted to pulmonary  Weaned to NC  He has since weaned to room air and does not need home O2  He likely has FORTINO and will need pulmonary followup for FORTINO workup             CXR\"  IMPRESSION:  1. Bibasilar pneumonia, right greater than left..  2. Cardiomegaly   \"     He is on a course of rocephin and azithromycin. He will complete vantin/ azithromycin for 1 more day at discharge      Prednisone added for 5 days      No tamiflu due to duration of illness        Seen by cardiology for afib  On xarelto/ metoprolol  ECHO \"       Left Ventricle: Normal left ventricular systolic function with a visually estimated EF of 50 - 55%. Left ventricle size is normal. Mildly increased wall thickness. Normal wall motion.    Aortic

## 2023-12-31 NOTE — PLAN OF CARE
Problem: Discharge Planning  Goal: Discharge to home or other facility with appropriate resources  12/31/2023 0934 by Jessica Schultz RN  Outcome: Progressing  Flowsheets (Taken 12/31/2023 0810)  Discharge to home or other facility with appropriate resources: Identify barriers to discharge with patient and caregiver  12/31/2023 0040 by Isha Pitts RN  Outcome: Progressing     Problem: Pain  Goal: Verbalizes/displays adequate comfort level or baseline comfort level  12/31/2023 0934 by Jessica Schultz RN  Outcome: Progressing  Flowsheets (Taken 12/31/2023 0810)  Verbalizes/displays adequate comfort level or baseline comfort level:   Encourage patient to monitor pain and request assistance   Assess pain using appropriate pain scale  12/31/2023 0040 by Isha Pitts RN  Outcome: Progressing     Problem: Safety - Adult  Goal: Free from fall injury  12/31/2023 0934 by Jessica Schultz RN  Outcome: Progressing  Flowsheets (Taken 12/31/2023 0810)  Free From Fall Injury: Instruct family/caregiver on patient safety  12/31/2023 0040 by Isha Pitts, RN  Outcome: Progressing

## 2023-12-31 NOTE — PLAN OF CARE
Problem: Discharge Planning  Goal: Discharge to home or other facility with appropriate resources  12/31/2023 0040 by Isha Pitts RN  Outcome: Progressing  12/30/2023 1932 by Isha Pitts RN  Outcome: Progressing  Flowsheets  Taken 12/30/2023 1915 by Isha Pitts RN  Discharge to home or other facility with appropriate resources: Identify barriers to discharge with patient and caregiver  Taken 12/30/2023 0745 by Emilie Man RN  Discharge to home or other facility with appropriate resources: Identify barriers to discharge with patient and caregiver     Problem: Pain  Goal: Verbalizes/displays adequate comfort level or baseline comfort level  12/31/2023 0040 by Isha Pitts RN  Outcome: Progressing  12/30/2023 1932 by Isha Pitts RN  Outcome: Progressing  Flowsheets (Taken 12/30/2023 0718 by Emilie Man, RN)  Verbalizes/displays adequate comfort level or baseline comfort level: Encourage patient to monitor pain and request assistance     Problem: Safety - Adult  Goal: Free from fall injury  12/31/2023 0040 by Isha Pitts RN  Outcome: Progressing  12/30/2023 1932 by Isha Pitts RN  Outcome: Progressing  Flowsheets (Taken 12/30/2023 1915)  Free From Fall Injury: Instruct family/caregiver on patient safety

## 2023-12-31 NOTE — PROGRESS NOTES
12/31/23 0954   Resting (Room Air)   SpO2 97   HR 98   During Walk (Room Air)   SpO2 92      Walk/Assistance Device Ambulation   Rate of Dyspnea 1   During Walk (On O2)   Need Additional O2 Flow Rate Rows No   Walk/Assistance Device Ambulation   Rate of Dyspnea 1   After Walk   SpO2 92   HR 94   Rate of Dyspnea 1   Does the Patient Qualify for Home O2 No   Does the Patient Need Portable Oxygen Tanks No     Patient only wearing oxygen when he lies down.

## 2023-12-31 NOTE — PROGRESS NOTES
[facility] CARDIOLOGY PROGRESS NOTE           12/31/2023 10:44 AM    Admit Date: 12/27/2023         Subjective: HR is controlled, echo reviewed and largely normal.    ROS:  Cardiovascular:  As noted above    Objective:      Vitals:    12/31/23 0403 12/31/23 0717 12/31/23 0910 12/31/23 0911   BP:  (!) 135/95     Pulse:  72     Resp:  18     Temp:  98.1 °F (36.7 °C)     TempSrc:  Axillary     SpO2:  91%     Weight: 122 kg (269 lb)   122 kg (269 lb)   Height:   1.778 m (5' 10\") 1.778 m (5' 10\")           Physical Exam:  General: Well Developed, Well Nourished, No Acute Distress, Alert & Oriented x 3, Appropriate mood  Neck: supple, no JVD  Heart: S1S2 with RRR without murmurs or gallops  Lungs: Clear throughout auscultation bilaterally without adventitious sounds  Abd: soft, nontender, nondistended, with good bowel sounds  Ext: no edema bilaterally  Skin: warm and dry      Data Review:   Recent Labs     12/30/23  0320 12/31/23  0412    138   K 3.5 3.8   MG 2.3 2.5*   BUN 18 19   WBC 14.9* 12.3*   HGB 13.8 13.9   HCT 42.0 41.7    329       No results for input(s): \"TNIPOC\" in the last 72 hours.    Invalid input(s): \"TROIQ\"        Assessment/Plan:     Principal Problem:    Acute respiratory failure with hypoxia (HCC)  Active Problems:    FORTINO (obstructive sleep apnea)    Paroxysmal atrial fibrillation (HCC)    Venous stasis syndrome    Diastolic dysfunction    Influenza A with respiratory manifestations    Atrial fibrillation with RVR (HCC)  Resolved Problems:    * No resolved hospital problems. *    1) Afib - rate controlled continue OAC for stroke prevention  2) Echo reviewed  3) HTN - continue home meds on DC  4) Viral URI - per primary team    Follow up with [facility] on discharge.    Cardiology will sign off.    Martell Timmons MD  12/31/2023 10:44 AM

## 2023-12-31 NOTE — CARE COORDINATION
CM notified by MD NINO of O2 no longer needed. CM picked-up O2 tank from room and returned to DME closet.     Pt is for discharge home today with family and [facility] PT/RN services. CM attempted to send referral to [facility] multiple times, with failed return. Contacted [facility] and left message, awaiting for RN to return CM call.     CM sent referral to [facility] for PT/RN services.

## 2024-01-01 LAB
BACTERIA SPEC CULT: NORMAL
BACTERIA SPEC CULT: NORMAL
SERVICE CMNT-IMP: NORMAL
SERVICE CMNT-IMP: NORMAL

## 2024-01-02 ENCOUNTER — TELEPHONE (OUTPATIENT)
Dept: PULMONOLOGY | Age: 74
End: 2024-01-02

## 2024-01-02 NOTE — TELEPHONE ENCOUNTER
----- Message from PROMISE Alvarenga CNP sent at 12/30/2023  1:52 PM EST -----  Regarding: TCM Follow Up  Please contact patient and arrange a TCM f/u visit in 2 weeks     Thank you,   PROMISE Alvarenga CNP

## 2024-01-02 NOTE — TELEPHONE ENCOUNTER
Care Transitions Initial Follow Up Call    Outreach made within 2 business days of discharge: Yes    Patient: Benito Barber Patient : 1950   MRN: [id number]  Reason for Admission:   Discharge Date: 23       Spoke with: left msg  Discharge department/facility: [facility]    Scheduled appointment with PCP within 7-14 days    Follow Up  Future Appointments   Date Time Provider Department Center   2024 10:45 AM Wilbert Benites Jr., MD [facility]     Left msg to call back to confirm f/u appt 24, need to review d/c instructions and medications.

## 2024-01-03 NOTE — TELEPHONE ENCOUNTER
Able to reach patient, reports feeling much better and has what he needs. Does  not plan to come to any f/u appts.

## 2024-03-09 ENCOUNTER — HOSPITAL ENCOUNTER (OUTPATIENT)
Dept: SLEEP CENTER | Age: 74
Discharge: HOME | End: 2024-03-12
Payer: MEDICARE

## 2024-03-09 PROCEDURE — 95806 SLEEP STUDY UNATT&RESP EFFT: CPT

## 2024-03-17 DIAGNOSIS — G47.33 OSA (OBSTRUCTIVE SLEEP APNEA): Primary | ICD-10-CM

## 2024-04-08 ENCOUNTER — HOSPITAL ENCOUNTER (OUTPATIENT)
Dept: SLEEP CENTER | Age: 74
Discharge: HOME | End: 2024-04-11
Payer: MEDICARE

## 2024-04-08 PROCEDURE — 95811 POLYSOM 6/>YRS CPAP 4/> PARM: CPT

## 2024-04-11 ENCOUNTER — TELEPHONE (OUTPATIENT)
Dept: SLEEP MEDICINE | Age: 74
End: 2024-04-11

## 2024-04-12 NOTE — PROGRESS NOTES
surgical neck of right humerus, initial encounter    Osteoarthritis of right shoulder    Chronic venous embolism and thrombosis of deep vessels of left lower extremity (HCC)    Disorder of lipid metabolism    FORTINO (obstructive sleep apnea)    Narcotic drug use    Paroxysmal atrial fibrillation (HCC)    Venous stasis syndrome    Postoperative anemia due to acute blood loss    Acute respiratory failure with hypoxia (HCC)    Influenza A with respiratory manifestations    Atrial fibrillation with RVR (HCC)    Nocturnal hypoxemia    Morbid obesity (HCC)       Past Surgical History:   Procedure Laterality Date    JOINT REPLACEMENT Left     Hip    LIPOMA RESECTION  as a teenager    left side    MULTIPLE TOOTH EXTRACTIONS      Al teeth removed    OTHER SURGICAL HISTORY      wisdom teeth removed    OTHER SURGICAL HISTORY      lipoma removed left side    SHOULDER SURGERY Right 7/15/2022    REVERSE RIGHT TOTAL SHOULDER ARTHROPLASTY WITH DELTA EXTEND PROSTHESIS, BICEPS TENODESIS performed by Wilbert Benites Jr., MD at [facility] [place] [place] MAIN OR    TOTAL HIP ARTHROPLASTY  4/2010    right    WISDOM TOOTH EXTRACTION  as a teenager       [unfilled]    Social History     Socioeconomic History    Marital status:      Spouse name: Not on file    Number of children: Not on file    Years of education: Not on file    Highest education level: Not on file   Occupational History    Not on file   Tobacco Use    Smoking status: Former     Current packs/day: 0.00     Average packs/day: 0.5 packs/day for 20.0 years (10.0 ttl pk-yrs)     Types: Cigarettes     Start date: 1989     Quit date: 2009     Years since quitting: 15.2    Smokeless tobacco: Never    Tobacco comments:     Quit smoking: quit about 12/2009   Vaping Use    Vaping Use: Never used   Substance and Sexual Activity    Alcohol use: No    Drug use: No    Sexual activity: Not on file   Other Topics Concern    Not on file   Social History Narrative    Not on file     Social Determinants of

## 2024-04-15 ENCOUNTER — OFFICE VISIT (OUTPATIENT)
Dept: SLEEP MEDICINE | Age: 74
End: 2024-04-15
Payer: MEDICARE

## 2024-04-15 VITALS
TEMPERATURE: 98 F | WEIGHT: 289 LBS | DIASTOLIC BLOOD PRESSURE: 70 MMHG | OXYGEN SATURATION: 94 % | RESPIRATION RATE: 14 BRPM | SYSTOLIC BLOOD PRESSURE: 110 MMHG | HEIGHT: 70 IN | BODY MASS INDEX: 41.37 KG/M2 | HEART RATE: 65 BPM

## 2024-04-15 DIAGNOSIS — G47.33 OSA (OBSTRUCTIVE SLEEP APNEA): Primary | ICD-10-CM

## 2024-04-15 DIAGNOSIS — G47.34 NOCTURNAL HYPOXEMIA: ICD-10-CM

## 2024-04-15 DIAGNOSIS — I48.0 PAROXYSMAL ATRIAL FIBRILLATION (HCC): ICD-10-CM

## 2024-04-15 DIAGNOSIS — E66.01 MORBID OBESITY (HCC): ICD-10-CM

## 2024-04-15 PROCEDURE — 3074F SYST BP LT 130 MM HG: CPT | Performed by: INTERNAL MEDICINE

## 2024-04-15 PROCEDURE — 1123F ACP DISCUSS/DSCN MKR DOCD: CPT | Performed by: INTERNAL MEDICINE

## 2024-04-15 PROCEDURE — 99215 OFFICE O/P EST HI 40 MIN: CPT | Performed by: INTERNAL MEDICINE

## 2024-04-15 PROCEDURE — 3078F DIAST BP <80 MM HG: CPT | Performed by: INTERNAL MEDICINE

## 2024-04-15 NOTE — PATIENT INSTRUCTIONS
like--ones that make your heart beat faster, your muscles stronger, and your muscles and joints more flexible. If you find more than one thing you like doing, do them all. You don't have to do the same thing every day.  Get your heart pumping every day. Any activity that makes your heart beat faster and keeps it at that rate for a while counts.  Here are some great ways to get your heart beating faster:  Go for a brisk walk, run, or bike ride.  Go for a hike or swim.  Go in-line skating.  Play a game of touch football, basketball, or soccer.  Ride a bike.  Play tennis or racquetball.  Climb stairs.  Even some household chores can be aerobic--just do them at a faster pace. Vacuuming, raking or mowing the lawn, sweeping the garage, and washing and waxing the car all can help get your heart rate up.  Strengthen your muscles during the week. You don't have to lift heavy weights or grow big, bulky muscles to get stronger. Doing a few simple activities that make your muscles work against, or \"resist,\" something can help you get stronger.  For example, you can:  Do push-ups or sit-ups, which use your own body weight as resistance.  Lift weights or dumbbells or use stretch bands at home or in a gym or community center.  Stretch your muscles often. Stretching will help you as you become more active. It can help you stay flexible, loosen tight muscles, and avoid injury. It can also help improve your balance and posture and can be a great way to relax.  Be sure to stretch the muscles you'll be using when you work out. It’s best to warm your muscles slightly before you stretch them. Walk or do some other light aerobic activity for a few minutes, and then start stretching.  When you stretch your muscles:  Do it slowly. Stretching is not about going fast or making sudden movements.  Don't push or bounce during a stretch.  Hold each stretch for at least 15 to 30 seconds, if you can. You should feel a stretch in the muscle, but not

## 2024-05-13 ENCOUNTER — TELEPHONE (OUTPATIENT)
Dept: PULMONOLOGY | Age: 74
End: 2024-05-13

## 2024-05-13 NOTE — TELEPHONE ENCOUNTER
Patient says he has air leaking around his mask he has tried several  and it is still leaking . [email or web address] told him that it was set almost to the max limit . That his cpap needed adjustment

## 2024-05-14 NOTE — TELEPHONE ENCOUNTER
Called patient back from speaking Dr. Villasenor, patient is informed we have decreased pressure and verbalizes no questions or concerns.

## 2024-05-14 NOTE — TELEPHONE ENCOUNTER
Went to [employer], only a couple mask in store. He stated the pressure is too high, and was told by [employer] to call us to fix the pressure. Tried every mask available, but does not work due to pressure.    Patient refused to take machine and mask into [employer] to get the leak fixed. Encouraged patient this would be the best option to get started and he sounds frustrated and would not do this. He wants us to change the pressure so the mask will fit better.

## 2024-09-12 ENCOUNTER — OFFICE VISIT (OUTPATIENT)
Dept: SLEEP MEDICINE | Age: 74
End: 2024-09-12
Payer: MEDICARE

## 2024-09-12 VITALS
WEIGHT: 282 LBS | HEART RATE: 56 BPM | BODY MASS INDEX: 40.37 KG/M2 | OXYGEN SATURATION: 98 % | HEIGHT: 70 IN | DIASTOLIC BLOOD PRESSURE: 72 MMHG | SYSTOLIC BLOOD PRESSURE: 116 MMHG | RESPIRATION RATE: 14 BRPM

## 2024-09-12 DIAGNOSIS — I48.0 PAROXYSMAL ATRIAL FIBRILLATION (HCC): ICD-10-CM

## 2024-09-12 DIAGNOSIS — G47.33 OSA (OBSTRUCTIVE SLEEP APNEA): Primary | ICD-10-CM

## 2024-09-12 DIAGNOSIS — E66.01 MORBID OBESITY (HCC): ICD-10-CM

## 2024-09-12 DIAGNOSIS — G47.34 NOCTURNAL HYPOXEMIA: ICD-10-CM

## 2024-09-12 PROCEDURE — G2211 COMPLEX E/M VISIT ADD ON: HCPCS | Performed by: INTERNAL MEDICINE

## 2024-09-12 PROCEDURE — 1123F ACP DISCUSS/DSCN MKR DOCD: CPT | Performed by: INTERNAL MEDICINE

## 2024-09-12 PROCEDURE — 3078F DIAST BP <80 MM HG: CPT | Performed by: INTERNAL MEDICINE

## 2024-09-12 PROCEDURE — 3074F SYST BP LT 130 MM HG: CPT | Performed by: INTERNAL MEDICINE

## 2024-09-12 PROCEDURE — 99214 OFFICE O/P EST MOD 30 MIN: CPT | Performed by: INTERNAL MEDICINE

## 2024-09-12 ASSESSMENT — SLEEP AND FATIGUE QUESTIONNAIRES
HOW LIKELY ARE YOU TO NOD OFF OR FALL ASLEEP IN A CAR, WHILE STOPPED FOR A FEW MINUTES IN TRAFFIC: WOULD NEVER DOZE
HOW LIKELY ARE YOU TO NOD OFF OR FALL ASLEEP WHILE SITTING INACTIVE IN A PUBLIC PLACE: WOULD NEVER DOZE
HOW LIKELY ARE YOU TO NOD OFF OR FALL ASLEEP WHILE SITTING QUIETLY AFTER LUNCH WITHOUT ALCOHOL: WOULD NEVER DOZE
ESS TOTAL SCORE: 1
HOW LIKELY ARE YOU TO NOD OFF OR FALL ASLEEP WHILE WATCHING TV: WOULD NEVER DOZE
HOW LIKELY ARE YOU TO NOD OFF OR FALL ASLEEP WHILE LYING DOWN TO REST IN THE AFTERNOON WHEN CIRCUMSTANCES PERMIT: SLIGHT CHANCE OF DOZING
HOW LIKELY ARE YOU TO NOD OFF OR FALL ASLEEP WHEN YOU ARE A PASSENGER IN A CAR FOR AN HOUR WITHOUT A BREAK: WOULD NEVER DOZE
HOW LIKELY ARE YOU TO NOD OFF OR FALL ASLEEP WHILE SITTING AND READING: WOULD NEVER DOZE
HOW LIKELY ARE YOU TO NOD OFF OR FALL ASLEEP WHILE SITTING AND TALKING TO SOMEONE: WOULD NEVER DOZE

## 2025-03-17 NOTE — PROGRESS NOTES
reading 0 0   Watching TV 0 0   Sitting, inactive in a public place (e.g. a theatre or a meeting) 0 0   As a passenger in a car for an hour without a break 0 0   Lying down to rest in the afternoon when circumstances permit 2 1   Sitting and talking to someone 0 0   Sitting quietly after a lunch without alcohol 0 0   In a car, while stopped for a few minutes in traffic 0 0   [place] Sleepiness Score 2 1       Download:      Past Medical History:   Diagnosis Date    Arthritis     hips, back  osteo    Atrial fibrillation (HCC)     x 1 episode- takes metoprolol    Cancer (HCC)     skin  left arm removed    DISH (diffuse idiopathic skeletal hyperostosis)     Followed by Rheum    Hypertension dx'd about 2008    controlled with meds    Morbid obesity 4/15/2024    FORTINO (obstructive sleep apnea) 04/24/2019    Osteoporosis     PE (pulmonary thromboembolism) (HCC)     Sleep apnea     resolved after weight loss     SOB (shortness of breath) on exertion     Some SOB with just sitting    Thromboembolus (HCC) 08, 12, AND 15+ yrs ago     ? left leg X 3- xarelto       Patient Active Problem List   Diagnosis    Primary hypertension    S/P prosthetic total arthroplasty of the hip    Diastolic dysfunction    Osteoarthritis of left hip    Tobacco abuse    Closed 4-part fracture of surgical neck of right humerus    Closed fracture of shaft of right humerus    Degenerative joint disease of right acromioclavicular joint    Closed 4-part fracture of surgical neck of right humerus, initial encounter    Osteoarthritis of right shoulder    Chronic venous embolism and thrombosis of deep vessels of left lower extremity (HCC)    Disorder of lipid metabolism    FORTINO (obstructive sleep apnea)    Narcotic drug use    Paroxysmal atrial fibrillation (HCC)    Venous stasis syndrome    Postoperative anemia due to acute blood loss    Acute respiratory failure with hypoxia (HCC)    Influenza A with respiratory manifestations    Atrial fibrillation with RVR

## 2025-03-20 ENCOUNTER — OFFICE VISIT (OUTPATIENT)
Dept: SLEEP MEDICINE | Age: 75
End: 2025-03-20
Payer: MEDICARE

## 2025-03-20 VITALS
OXYGEN SATURATION: 94 % | RESPIRATION RATE: 20 BRPM | DIASTOLIC BLOOD PRESSURE: 78 MMHG | BODY MASS INDEX: 41.66 KG/M2 | TEMPERATURE: 97.3 F | WEIGHT: 291 LBS | HEART RATE: 51 BPM | HEIGHT: 70 IN | SYSTOLIC BLOOD PRESSURE: 138 MMHG

## 2025-03-20 DIAGNOSIS — E66.01 MORBID OBESITY: ICD-10-CM

## 2025-03-20 DIAGNOSIS — I48.0 PAROXYSMAL ATRIAL FIBRILLATION (HCC): ICD-10-CM

## 2025-03-20 DIAGNOSIS — G47.33 OSA (OBSTRUCTIVE SLEEP APNEA): Primary | ICD-10-CM

## 2025-03-20 DIAGNOSIS — G47.34 NOCTURNAL HYPOXEMIA: ICD-10-CM

## 2025-03-20 PROCEDURE — 3078F DIAST BP <80 MM HG: CPT | Performed by: INTERNAL MEDICINE

## 2025-03-20 PROCEDURE — 1160F RVW MEDS BY RX/DR IN RCRD: CPT | Performed by: INTERNAL MEDICINE

## 2025-03-20 PROCEDURE — G2211 COMPLEX E/M VISIT ADD ON: HCPCS | Performed by: INTERNAL MEDICINE

## 2025-03-20 PROCEDURE — 1159F MED LIST DOCD IN RCRD: CPT | Performed by: INTERNAL MEDICINE

## 2025-03-20 PROCEDURE — 3075F SYST BP GE 130 - 139MM HG: CPT | Performed by: INTERNAL MEDICINE

## 2025-03-20 PROCEDURE — 1123F ACP DISCUSS/DSCN MKR DOCD: CPT | Performed by: INTERNAL MEDICINE

## 2025-03-20 PROCEDURE — 99214 OFFICE O/P EST MOD 30 MIN: CPT | Performed by: INTERNAL MEDICINE

## 2025-03-20 RX ORDER — FLUTICASONE PROPIONATE 50 MCG
1 SPRAY, SUSPENSION (ML) NASAL DAILY
COMMUNITY
Start: 2025-02-07 | End: 2026-02-07

## 2025-03-20 RX ORDER — MAGNESIUM GLUCONATE 30 MG(550)
TABLET ORAL DAILY
COMMUNITY

## 2025-03-20 RX ORDER — LISINOPRIL AND HYDROCHLOROTHIAZIDE 20; 25 MG/1; MG/1
1 TABLET ORAL DAILY
COMMUNITY
Start: 2024-08-27 | End: 2025-08-27

## 2025-03-20 RX ORDER — TRAZODONE HYDROCHLORIDE 50 MG/1
50-100 TABLET ORAL
COMMUNITY
Start: 2024-08-05

## 2025-03-20 ASSESSMENT — SLEEP AND FATIGUE QUESTIONNAIRES
HOW LIKELY ARE YOU TO NOD OFF OR FALL ASLEEP WHILE WATCHING TV: WOULD NEVER DOZE
HOW LIKELY ARE YOU TO NOD OFF OR FALL ASLEEP WHILE LYING DOWN TO REST IN THE AFTERNOON WHEN CIRCUMSTANCES PERMIT: MODERATE CHANCE OF DOZING
HOW LIKELY ARE YOU TO NOD OFF OR FALL ASLEEP WHILE SITTING QUIETLY AFTER LUNCH WITHOUT ALCOHOL: WOULD NEVER DOZE
HOW LIKELY ARE YOU TO NOD OFF OR FALL ASLEEP WHILE SITTING INACTIVE IN A PUBLIC PLACE: WOULD NEVER DOZE
HOW LIKELY ARE YOU TO NOD OFF OR FALL ASLEEP WHILE SITTING AND READING: WOULD NEVER DOZE
HOW LIKELY ARE YOU TO NOD OFF OR FALL ASLEEP WHILE SITTING AND TALKING TO SOMEONE: WOULD NEVER DOZE
HOW LIKELY ARE YOU TO NOD OFF OR FALL ASLEEP WHEN YOU ARE A PASSENGER IN A CAR FOR AN HOUR WITHOUT A BREAK: WOULD NEVER DOZE
HOW LIKELY ARE YOU TO NOD OFF OR FALL ASLEEP IN A CAR, WHILE STOPPED FOR A FEW MINUTES IN TRAFFIC: WOULD NEVER DOZE
ESS TOTAL SCORE: 2

## (undated) DEVICE — SLING ARM SWTH UNIV FOAM 1 SZ FITS MOST

## (undated) DEVICE — SUTURE N ABSRB BRAIDED 2-0 MO-6 39 IN 26 MM 1/2 CIR BLU BLK 3910900023

## (undated) DEVICE — BNDG,ELSTC,MATRIX,STRL,4"X5YD,LF,HOOK&LP: Brand: MEDLINE

## (undated) DEVICE — GUIDEPIN ORTH L300MM DIA1.5MM GLENOSPHERE FOR DELT XTEND

## (undated) DEVICE — SCREW BNE L14MM DIA4.5MM PROX CORT TIB S STL ST LOK FULL
Type: IMPLANTABLE DEVICE | Site: SHOULDER | Status: NON-FUNCTIONAL
Removed: 2022-07-15

## (undated) DEVICE — INTEGUSEAL MICROBIAL SEALANT: Brand: AVANOS

## (undated) DEVICE — GUIDEPIN ORTH L190MM DIA2.5MM METAGLENE CTRL FOR DELT XTEND

## (undated) DEVICE — YANKAUER,BULB TIP,W/O VENT,RIGID,STERILE: Brand: MEDLINE

## (undated) DEVICE — DRAPE TWL SURG 16X26IN BLU ORB04] ALLCARE INC]

## (undated) DEVICE — TOTAL SHOULDER DR POSTA: Brand: MEDLINE INDUSTRIES, INC.

## (undated) DEVICE — Device

## (undated) DEVICE — BANDAGE COMPR ELASTIC 5 YDX6 IN

## (undated) DEVICE — PRECISION THIN (9.0 X 0.38 X 31.0MM)

## (undated) DEVICE — PAD,ABDOMINAL,5"X9",ST,LF,25/BX: Brand: MEDLINE INDUSTRIES, INC.

## (undated) DEVICE — 4.0MM ROUND FAST CUTTING BUR

## (undated) DEVICE — SUTURE N ABSRB BRAIDED 5-0 CTX 39 IN 48 MM WHT BLK XBRAID S 3910900052

## (undated) DEVICE — SOLUTION IRRIG 3000ML 0.9% SOD CHL USP UROMATIC PLAS CONT

## (undated) DEVICE — CANISTER, RIGID, 3000CC: Brand: MEDLINE INDUSTRIES, INC.

## (undated) DEVICE — DRESSING,GAUZE,XEROFORM,CURAD,5"X9",ST: Brand: CURAD

## (undated) DEVICE — OSCILLATING TIP SAW CARTRIDGE: Brand: STRYKER PRECISION

## (undated) DEVICE — SUTURE N ABSRB MONOFILAMENT 5-0 38 IN BLU FORC FIBER 3910900050

## (undated) DEVICE — BANDAGE,GAUZE,BULKEE II,4.5"X4.1YD,STRL: Brand: MEDLINE

## (undated) DEVICE — PADDING CAST W6INXL4YD RAYON UNDERCAST SOF-ROL

## (undated) DEVICE — DISPOSABLE DRAPE, STERILE, FOR A CDS-3060 5 FOOT TABLE: Brand: PEDIGO PRODUCTS, INC.

## (undated) DEVICE — YANKAUER,FLEXIBLE HANDLE,REGLR CAPACITY: Brand: MEDLINE INDUSTRIES, INC.

## (undated) DEVICE — PENCIL ES L3M BTTN SWCH HOLSTER W/ BLDE ELECTRD EDGE

## (undated) DEVICE — PAD,ABDOMINAL,5X9,NON-STERILE,LF: Brand: MEDLINE